# Patient Record
Sex: FEMALE | Race: OTHER | NOT HISPANIC OR LATINO | URBAN - METROPOLITAN AREA
[De-identification: names, ages, dates, MRNs, and addresses within clinical notes are randomized per-mention and may not be internally consistent; named-entity substitution may affect disease eponyms.]

---

## 2021-01-03 VITALS
WEIGHT: 130.95 LBS | OXYGEN SATURATION: 99 % | SYSTOLIC BLOOD PRESSURE: 152 MMHG | RESPIRATION RATE: 20 BRPM | DIASTOLIC BLOOD PRESSURE: 106 MMHG | HEART RATE: 107 BPM | HEIGHT: 62 IN | TEMPERATURE: 98 F

## 2021-01-03 PROCEDURE — 71045 X-RAY EXAM CHEST 1 VIEW: CPT | Mod: 26

## 2021-01-03 RX ORDER — SODIUM CHLORIDE 9 MG/ML
1000 INJECTION INTRAMUSCULAR; INTRAVENOUS; SUBCUTANEOUS ONCE
Refills: 0 | Status: COMPLETED | OUTPATIENT
Start: 2021-01-03 | End: 2021-01-03

## 2021-01-03 RX ADMIN — SODIUM CHLORIDE 1000 MILLILITER(S): 9 INJECTION INTRAMUSCULAR; INTRAVENOUS; SUBCUTANEOUS at 23:58

## 2021-01-03 RX ADMIN — Medication 1 MILLIGRAM(S): at 23:58

## 2021-01-03 NOTE — ED ADULT TRIAGE NOTE - CHIEF COMPLAINT QUOTE
pt c/o etoh withdrawal. pt reports daily etoh use. since del pt has been shaky, feeling chills, HA. reports drinking 1 bottle of colin tonight ~5pm to try to feel better, but feels worse. also reports right knee pain x3 days. denies cough.

## 2021-01-03 NOTE — ED PROVIDER NOTE - OBJECTIVE STATEMENT
29 yo F with PMH of hypothyroidism (on levothyroxine), asthma (prn meds), alcohol abuse (no DTs, szs or past admissions for withdrawal) p/w tremors, feeling anxious, headache, anxiety, nausea with dry retching and palpitations X few hours. Pt states that since the holidays (Novmeber) she has been drinking a bottle of [place] whisky almost daily. In the past, she states that she has been able to "taper" herself off alcohol and today she attempted to do this but started feeling tremulous, anxious, nauseated and with headache. Pt states that she started drinking whisky at noon today with last drink at 5 pm (7 hours PTA). Denies hallucinations, CP, SOB, abd pain. No vomiting or change in BMs, Denies SI, HI or hallucinations. Pt states that she was seen at [facility] ED for similar sxs a couple of months ago and was given an "infusion" and was dcd home. Was not admitted. Prior to the [facility] visit, pt has not had any ED visits or hospitalizations for alcohol withdrawal and states that she usiually manages her sxs at home. Has not been in alcohol rehab. Denies any illicit drug or tobacco use. Also states that she may have been exposed to someone at work who tested positive for Covid 19 and would also like to be tested for Covid 19 today. Denies fevers, chills, cough, diarrhea, loss of taste or smell or UR sxs.

## 2021-01-03 NOTE — ED PROVIDER NOTE - CCCP TRG CHIEF CMPLNT
----- Message from Anthony Ragland MD sent at 4/4/2018  9:54 AM CDT -----  Normal metabolic panel  
CALL PLACED TO PT ABOUT MESSAGE ABOVE. LAB RESULTS WERE CONVEYED TO PT. PT VERBALIZED UNDERSTANDING AND HAD NO FURTHER QUESTIONS.   
withdrawal/tremor

## 2021-01-03 NOTE — ED PROVIDER NOTE - CLINICAL SUMMARY MEDICAL DECISION MAKING FREE TEXT BOX
31 yo F with PMH of hypothyroidism (on levothyroxine), asthma (prn meds), alcohol abuse (no DTs, szs or past admissions for withdrawal) p/w tremors, feeling anxious, headache, anxiety, nausea with dry retching and palpitations X few hours. Pt states that since the holidays (Novmeber) she has been drinking a bottle of [place] whisky almost daily. In the past, she states that she has been able to "taper" herself off alcohol and today she attempted to do this but started feeling tremulous, anxious, nauseated and with headache. Pt states that she started drinking whisky at noon today with last drink at 5 pm (7 hours PTA). Denies hallucinations, CP, SOB, abd pain. No vomiting or change in BMs, Denies SI, HI or hallucinations. Pt states that she was seen at [facility] ED for similar sxs a couple of months ago and was given an "infusion" and was dcd home. Was not admitted. Prior to the [facility] visit, pt has not had any ED visits or hospitalizations for alcohol withdrawal and states that she usiually manages her sxs at home. Has not been in alcohol rehab. Denies any illicit drug or tobacco use. Also states that she may have been exposed to someone at work who tested positive for Covid 19 and would also like to be tested for Covid 19 today. Denies fevers, chills, cough, diarrhea, loss of taste or smell or UR sxs.   ED course: Pt hypertensive and tachycardic on ED arrival and noted to be tremulous. Labs sent. Ativan given. To reevaluate. 31 yo F with PMH of hypothyroidism (on levothyroxine), asthma (prn meds), alcohol abuse (no DTs, szs or past admissions for withdrawal) p/w tremors, feeling anxious, headache, anxiety, nausea with dry retching and palpitations X few hours. Pt states that since the holidays (Novmeber) she has been drinking a bottle of [place] whisky almost daily. In the past, she states that she has been able to "taper" herself off alcohol and today she attempted to do this but started feeling tremulous, anxious, nauseated and with headache. Pt states that she started drinking whisky at noon today with last drink at 5 pm (7 hours PTA). Denies hallucinations, CP, SOB, abd pain. No vomiting or change in BMs, Denies SI, HI or hallucinations. Pt states that she was seen at [facility] ED for similar sxs a couple of months ago and was given an "infusion" and was dcd home. Was not admitted. Prior to the [facility] visit, pt has not had any ED visits or hospitalizations for alcohol withdrawal and states that she usually manages her sxs at home. Has not been in alcohol rehab. Denies any illicit drug or tobacco use. Also states that she may have been exposed to someone at work who tested positive for Covid 19 and would also like to be tested for Covid 19 today. Denies fevers, chills, cough, diarrhea, loss of taste or smell or UR sxs.  ED course: VS noted- pt is hypertensive and tachycardic and tremulous. CIWA 10. Given Ativan IV. labs noted and pt with alcohol level 354. Pt also with potassium of 2.8. Given po and IV potassium. Pt feeling mildly better, and VS with some improvement after Ativan, but pt remains hypertensive and tachycardic. Given more Ativan and pt admitted to medicine for alcohol withdrawal.

## 2021-01-04 ENCOUNTER — INPATIENT (INPATIENT)
Facility: HOSPITAL | Age: 31
LOS: 0 days | Discharge: HOME | DRG: 897 | End: 2021-01-05
Attending: HOSPITALIST | Admitting: HOSPITALIST
Payer: COMMERCIAL

## 2021-01-04 DIAGNOSIS — E87.0 HYPEROSMOLALITY AND HYPERNATREMIA: ICD-10-CM

## 2021-01-04 DIAGNOSIS — Z29.9 ENCOUNTER FOR PROPHYLACTIC MEASURES, UNSPECIFIED: ICD-10-CM

## 2021-01-04 DIAGNOSIS — F10.239 ALCOHOL DEPENDENCE WITH WITHDRAWAL, UNSPECIFIED: ICD-10-CM

## 2021-01-04 DIAGNOSIS — E87.2 ACIDOSIS: ICD-10-CM

## 2021-01-04 DIAGNOSIS — E03.9 HYPOTHYROIDISM, UNSPECIFIED: ICD-10-CM

## 2021-01-04 DIAGNOSIS — J45.909 UNSPECIFIED ASTHMA, UNCOMPLICATED: ICD-10-CM

## 2021-01-04 DIAGNOSIS — E87.6 HYPOKALEMIA: ICD-10-CM

## 2021-01-04 DIAGNOSIS — R74.01 ELEVATION OF LEVELS OF LIVER TRANSAMINASE LEVELS: ICD-10-CM

## 2021-01-04 LAB
ALBUMIN SERPL ELPH-MCNC: 3.6 G/DL — SIGNIFICANT CHANGE UP (ref 3.3–5)
ALBUMIN SERPL ELPH-MCNC: 4.6 G/DL — SIGNIFICANT CHANGE UP (ref 3.3–5)
ALP SERPL-CCNC: 57 U/L — SIGNIFICANT CHANGE UP (ref 40–120)
ALP SERPL-CCNC: 73 U/L — SIGNIFICANT CHANGE UP (ref 40–120)
ALT FLD-CCNC: 20 U/L — SIGNIFICANT CHANGE UP (ref 10–45)
ALT FLD-CCNC: 27 U/L — SIGNIFICANT CHANGE UP (ref 10–45)
ANION GAP SERPL CALC-SCNC: 16 MMOL/L — SIGNIFICANT CHANGE UP (ref 5–17)
ANION GAP SERPL CALC-SCNC: 20 MMOL/L — HIGH (ref 5–17)
APPEARANCE UR: CLEAR — SIGNIFICANT CHANGE UP
APTT BLD: 28.1 SEC — SIGNIFICANT CHANGE UP (ref 27.5–35.5)
AST SERPL-CCNC: 36 U/L — SIGNIFICANT CHANGE UP (ref 10–40)
AST SERPL-CCNC: 45 U/L — HIGH (ref 10–40)
B-OH-BUTYR SERPL-SCNC: 1 MMOL/L — HIGH
B-OH-BUTYR SERPL-SCNC: 1.4 MMOL/L — HIGH
BASOPHILS # BLD AUTO: 0.05 K/UL — SIGNIFICANT CHANGE UP (ref 0–0.2)
BASOPHILS # BLD AUTO: 0.08 K/UL — SIGNIFICANT CHANGE UP (ref 0–0.2)
BASOPHILS NFR BLD AUTO: 0.7 % — SIGNIFICANT CHANGE UP (ref 0–2)
BASOPHILS NFR BLD AUTO: 1 % — SIGNIFICANT CHANGE UP (ref 0–2)
BILIRUB SERPL-MCNC: 0.5 MG/DL — SIGNIFICANT CHANGE UP (ref 0.2–1.2)
BILIRUB SERPL-MCNC: 0.8 MG/DL — SIGNIFICANT CHANGE UP (ref 0.2–1.2)
BILIRUB UR-MCNC: NEGATIVE — SIGNIFICANT CHANGE UP
BUN SERPL-MCNC: 7 MG/DL — SIGNIFICANT CHANGE UP (ref 7–23)
BUN SERPL-MCNC: 9 MG/DL — SIGNIFICANT CHANGE UP (ref 7–23)
CALCIUM SERPL-MCNC: 7.3 MG/DL — LOW (ref 8.4–10.5)
CALCIUM SERPL-MCNC: 8.5 MG/DL — SIGNIFICANT CHANGE UP (ref 8.4–10.5)
CHLORIDE SERPL-SCNC: 102 MMOL/L — SIGNIFICANT CHANGE UP (ref 96–108)
CHLORIDE SERPL-SCNC: 97 MMOL/L — SIGNIFICANT CHANGE UP (ref 96–108)
CO2 SERPL-SCNC: 24 MMOL/L — SIGNIFICANT CHANGE UP (ref 22–31)
CO2 SERPL-SCNC: 30 MMOL/L — SIGNIFICANT CHANGE UP (ref 22–31)
COLOR SPEC: YELLOW — SIGNIFICANT CHANGE UP
CREAT SERPL-MCNC: 0.54 MG/DL — SIGNIFICANT CHANGE UP (ref 0.5–1.3)
CREAT SERPL-MCNC: 0.73 MG/DL — SIGNIFICANT CHANGE UP (ref 0.5–1.3)
DIFF PNL FLD: ABNORMAL
EOSINOPHIL # BLD AUTO: 0.02 K/UL — SIGNIFICANT CHANGE UP (ref 0–0.5)
EOSINOPHIL # BLD AUTO: 0.09 K/UL — SIGNIFICANT CHANGE UP (ref 0–0.5)
EOSINOPHIL NFR BLD AUTO: 0.3 % — SIGNIFICANT CHANGE UP (ref 0–6)
EOSINOPHIL NFR BLD AUTO: 1.3 % — SIGNIFICANT CHANGE UP (ref 0–6)
EPI CELLS # UR: ABNORMAL /HPF (ref 0–5)
ETHANOL SERPL-MCNC: 354 MG/DL — HIGH (ref 0–10)
GLUCOSE SERPL-MCNC: 83 MG/DL — SIGNIFICANT CHANGE UP (ref 70–99)
GLUCOSE SERPL-MCNC: 93 MG/DL — SIGNIFICANT CHANGE UP (ref 70–99)
GLUCOSE UR QL: NEGATIVE — SIGNIFICANT CHANGE UP
HCG SERPL-ACNC: <0 MIU/ML — SIGNIFICANT CHANGE UP
HCT VFR BLD CALC: 37.9 % — SIGNIFICANT CHANGE UP (ref 34.5–45)
HCT VFR BLD CALC: 41.2 % — SIGNIFICANT CHANGE UP (ref 34.5–45)
HGB BLD-MCNC: 11.6 G/DL — SIGNIFICANT CHANGE UP (ref 11.5–15.5)
HGB BLD-MCNC: 12.8 G/DL — SIGNIFICANT CHANGE UP (ref 11.5–15.5)
IMM GRANULOCYTES NFR BLD AUTO: 0.4 % — SIGNIFICANT CHANGE UP (ref 0–1.5)
IMM GRANULOCYTES NFR BLD AUTO: 0.6 % — SIGNIFICANT CHANGE UP (ref 0–1.5)
KETONES UR-MCNC: 15 MG/DL
LACTATE SERPL-SCNC: 2.6 MMOL/L — HIGH (ref 0.5–2)
LACTATE SERPL-SCNC: 3.9 MMOL/L — HIGH (ref 0.5–2)
LEUKOCYTE ESTERASE UR-ACNC: NEGATIVE — SIGNIFICANT CHANGE UP
LIDOCAIN IGE QN: 33 U/L — SIGNIFICANT CHANGE UP (ref 7–60)
LYMPHOCYTES # BLD AUTO: 1.51 K/UL — SIGNIFICANT CHANGE UP (ref 1–3.3)
LYMPHOCYTES # BLD AUTO: 1.62 K/UL — SIGNIFICANT CHANGE UP (ref 1–3.3)
LYMPHOCYTES # BLD AUTO: 20.5 % — SIGNIFICANT CHANGE UP (ref 13–44)
LYMPHOCYTES # BLD AUTO: 22.5 % — SIGNIFICANT CHANGE UP (ref 13–44)
MAGNESIUM SERPL-MCNC: 1.8 MG/DL — SIGNIFICANT CHANGE UP (ref 1.6–2.6)
MCHC RBC-ENTMCNC: 30.6 GM/DL — LOW (ref 32–36)
MCHC RBC-ENTMCNC: 30.6 PG — SIGNIFICANT CHANGE UP (ref 27–34)
MCHC RBC-ENTMCNC: 30.7 PG — SIGNIFICANT CHANGE UP (ref 27–34)
MCHC RBC-ENTMCNC: 31.1 GM/DL — LOW (ref 32–36)
MCV RBC AUTO: 100.3 FL — HIGH (ref 80–100)
MCV RBC AUTO: 98.6 FL — SIGNIFICANT CHANGE UP (ref 80–100)
MONOCYTES # BLD AUTO: 0.46 K/UL — SIGNIFICANT CHANGE UP (ref 0–0.9)
MONOCYTES # BLD AUTO: 0.48 K/UL — SIGNIFICANT CHANGE UP (ref 0–0.9)
MONOCYTES NFR BLD AUTO: 6.1 % — SIGNIFICANT CHANGE UP (ref 2–14)
MONOCYTES NFR BLD AUTO: 6.9 % — SIGNIFICANT CHANGE UP (ref 2–14)
NEUTROPHILS # BLD AUTO: 4.57 K/UL — SIGNIFICANT CHANGE UP (ref 1.8–7.4)
NEUTROPHILS # BLD AUTO: 5.65 K/UL — SIGNIFICANT CHANGE UP (ref 1.8–7.4)
NEUTROPHILS NFR BLD AUTO: 68.2 % — SIGNIFICANT CHANGE UP (ref 43–77)
NEUTROPHILS NFR BLD AUTO: 71.5 % — SIGNIFICANT CHANGE UP (ref 43–77)
NITRITE UR-MCNC: NEGATIVE — SIGNIFICANT CHANGE UP
NRBC # BLD: 0 /100 WBCS — SIGNIFICANT CHANGE UP (ref 0–0)
NRBC # BLD: 0 /100 WBCS — SIGNIFICANT CHANGE UP (ref 0–0)
PH UR: 6.5 — SIGNIFICANT CHANGE UP (ref 5–8)
PHOSPHATE SERPL-MCNC: 1.5 MG/DL — LOW (ref 2.5–4.5)
PHOSPHATE SERPL-MCNC: 2.4 MG/DL — LOW (ref 2.5–4.5)
PLATELET # BLD AUTO: 196 K/UL — SIGNIFICANT CHANGE UP (ref 150–400)
PLATELET # BLD AUTO: 220 K/UL — SIGNIFICANT CHANGE UP (ref 150–400)
POTASSIUM SERPL-MCNC: 2.8 MMOL/L — CRITICAL LOW (ref 3.5–5.3)
POTASSIUM SERPL-MCNC: 3.5 MMOL/L — SIGNIFICANT CHANGE UP (ref 3.5–5.3)
POTASSIUM SERPL-SCNC: 2.8 MMOL/L — CRITICAL LOW (ref 3.5–5.3)
POTASSIUM SERPL-SCNC: 3.5 MMOL/L — SIGNIFICANT CHANGE UP (ref 3.5–5.3)
PROT SERPL-MCNC: 6.6 G/DL — SIGNIFICANT CHANGE UP (ref 6–8.3)
PROT SERPL-MCNC: 8 G/DL — SIGNIFICANT CHANGE UP (ref 6–8.3)
PROT UR-MCNC: NEGATIVE MG/DL — SIGNIFICANT CHANGE UP
RBC # BLD: 3.78 M/UL — LOW (ref 3.8–5.2)
RBC # BLD: 4.18 M/UL — SIGNIFICANT CHANGE UP (ref 3.8–5.2)
RBC # FLD: 18.7 % — HIGH (ref 10.3–14.5)
RBC # FLD: 19 % — HIGH (ref 10.3–14.5)
SARS-COV-2 IGG SERPL QL IA: NEGATIVE — SIGNIFICANT CHANGE UP
SARS-COV-2 IGM SERPL IA-ACNC: <0.1 INDEX — SIGNIFICANT CHANGE UP
SARS-COV-2 RNA SPEC QL NAA+PROBE: SIGNIFICANT CHANGE UP
SODIUM SERPL-SCNC: 142 MMOL/L — SIGNIFICANT CHANGE UP (ref 135–145)
SODIUM SERPL-SCNC: 147 MMOL/L — HIGH (ref 135–145)
SP GR SPEC: 1.01 — SIGNIFICANT CHANGE UP (ref 1–1.03)
T3 SERPL-MCNC: 53 NG/DL — LOW (ref 80–200)
T4 AB SER-ACNC: 5.09 UG/DL — SIGNIFICANT CHANGE UP (ref 3.17–11.72)
T4 FREE SERPL-MCNC: 0.93 NG/DL — SIGNIFICANT CHANGE UP (ref 0.7–1.48)
TSH SERPL-MCNC: 8.5 UIU/ML — HIGH (ref 0.35–4.94)
UROBILINOGEN FLD QL: 0.2 E.U./DL — SIGNIFICANT CHANGE UP
WBC # BLD: 6.71 K/UL — SIGNIFICANT CHANGE UP (ref 3.8–10.5)
WBC # BLD: 7.9 K/UL — SIGNIFICANT CHANGE UP (ref 3.8–10.5)
WBC # FLD AUTO: 6.71 K/UL — SIGNIFICANT CHANGE UP (ref 3.8–10.5)
WBC # FLD AUTO: 7.9 K/UL — SIGNIFICANT CHANGE UP (ref 3.8–10.5)
WBC UR QL: < 5 /HPF — SIGNIFICANT CHANGE UP

## 2021-01-04 PROCEDURE — 99222 1ST HOSP IP/OBS MODERATE 55: CPT | Mod: AI,GC

## 2021-01-04 PROCEDURE — 93010 ELECTROCARDIOGRAM REPORT: CPT

## 2021-01-04 PROCEDURE — 99291 CRITICAL CARE FIRST HOUR: CPT

## 2021-01-04 RX ORDER — ENOXAPARIN SODIUM 100 MG/ML
40 INJECTION SUBCUTANEOUS EVERY 24 HOURS
Refills: 0 | Status: DISCONTINUED | OUTPATIENT
Start: 2021-01-04 | End: 2021-01-05

## 2021-01-04 RX ORDER — ENOXAPARIN SODIUM 100 MG/ML
40 INJECTION SUBCUTANEOUS EVERY 24 HOURS
Refills: 0 | Status: DISCONTINUED | OUTPATIENT
Start: 2021-01-04 | End: 2021-01-04

## 2021-01-04 RX ORDER — SODIUM CHLORIDE 9 MG/ML
500 INJECTION, SOLUTION INTRAVENOUS ONCE
Refills: 0 | Status: COMPLETED | OUTPATIENT
Start: 2021-01-04 | End: 2021-01-04

## 2021-01-04 RX ORDER — SODIUM CHLORIDE 9 MG/ML
1000 INJECTION INTRAMUSCULAR; INTRAVENOUS; SUBCUTANEOUS ONCE
Refills: 0 | Status: COMPLETED | OUTPATIENT
Start: 2021-01-04 | End: 2021-01-04

## 2021-01-04 RX ORDER — POTASSIUM CHLORIDE 20 MEQ
20 PACKET (EA) ORAL ONCE
Refills: 0 | Status: COMPLETED | OUTPATIENT
Start: 2021-01-04 | End: 2021-01-04

## 2021-01-04 RX ORDER — POTASSIUM CHLORIDE 20 MEQ
10 PACKET (EA) ORAL
Refills: 0 | Status: COMPLETED | OUTPATIENT
Start: 2021-01-04 | End: 2021-01-04

## 2021-01-04 RX ORDER — MAGNESIUM SULFATE 500 MG/ML
1 VIAL (ML) INJECTION ONCE
Refills: 0 | Status: COMPLETED | OUTPATIENT
Start: 2021-01-04 | End: 2021-01-04

## 2021-01-04 RX ORDER — POTASSIUM CHLORIDE 20 MEQ
40 PACKET (EA) ORAL ONCE
Refills: 0 | Status: COMPLETED | OUTPATIENT
Start: 2021-01-04 | End: 2021-01-04

## 2021-01-04 RX ORDER — POTASSIUM CHLORIDE 20 MEQ
10 PACKET (EA) ORAL ONCE
Refills: 0 | Status: DISCONTINUED | OUTPATIENT
Start: 2021-01-04 | End: 2021-01-04

## 2021-01-04 RX ORDER — FOLIC ACID 0.8 MG
1 TABLET ORAL DAILY
Refills: 0 | Status: DISCONTINUED | OUTPATIENT
Start: 2021-01-04 | End: 2021-01-05

## 2021-01-04 RX ORDER — ONDANSETRON 8 MG/1
4 TABLET, FILM COATED ORAL ONCE
Refills: 0 | Status: COMPLETED | OUTPATIENT
Start: 2021-01-04 | End: 2021-01-05

## 2021-01-04 RX ORDER — THIAMINE MONONITRATE (VIT B1) 100 MG
100 TABLET ORAL DAILY
Refills: 0 | Status: DISCONTINUED | OUTPATIENT
Start: 2021-01-04 | End: 2021-01-05

## 2021-01-04 RX ORDER — POTASSIUM PHOSPHATE, MONOBASIC POTASSIUM PHOSPHATE, DIBASIC 236; 224 MG/ML; MG/ML
30 INJECTION, SOLUTION INTRAVENOUS ONCE
Refills: 0 | Status: COMPLETED | OUTPATIENT
Start: 2021-01-04 | End: 2021-01-04

## 2021-01-04 RX ORDER — ACETAMINOPHEN 500 MG
650 TABLET ORAL EVERY 8 HOURS
Refills: 0 | Status: DISCONTINUED | OUTPATIENT
Start: 2021-01-04 | End: 2021-01-05

## 2021-01-04 RX ORDER — LEVOTHYROXINE SODIUM 125 MCG
150 TABLET ORAL DAILY
Refills: 0 | Status: DISCONTINUED | OUTPATIENT
Start: 2021-01-04 | End: 2021-01-05

## 2021-01-04 RX ADMIN — SODIUM CHLORIDE 1000 MILLILITER(S): 9 INJECTION INTRAMUSCULAR; INTRAVENOUS; SUBCUTANEOUS at 00:58

## 2021-01-04 RX ADMIN — Medication 100 GRAM(S): at 09:40

## 2021-01-04 RX ADMIN — Medication 650 MILLIGRAM(S): at 23:30

## 2021-01-04 RX ADMIN — Medication 50 MILLIGRAM(S): at 02:02

## 2021-01-04 RX ADMIN — POTASSIUM PHOSPHATE, MONOBASIC POTASSIUM PHOSPHATE, DIBASIC 83.33 MILLIMOLE(S): 236; 224 INJECTION, SOLUTION INTRAVENOUS at 10:32

## 2021-01-04 RX ADMIN — Medication 1 TABLET(S): at 11:49

## 2021-01-04 RX ADMIN — SODIUM CHLORIDE 1000 MILLILITER(S): 9 INJECTION INTRAMUSCULAR; INTRAVENOUS; SUBCUTANEOUS at 04:17

## 2021-01-04 RX ADMIN — SODIUM CHLORIDE 500 MILLILITER(S): 9 INJECTION, SOLUTION INTRAVENOUS at 03:30

## 2021-01-04 RX ADMIN — Medication 50 MILLIGRAM(S): at 16:58

## 2021-01-04 RX ADMIN — Medication 20 MILLIEQUIVALENT(S): at 09:39

## 2021-01-04 RX ADMIN — SODIUM CHLORIDE 1000 MILLILITER(S): 9 INJECTION INTRAMUSCULAR; INTRAVENOUS; SUBCUTANEOUS at 00:30

## 2021-01-04 RX ADMIN — Medication 100 MILLIEQUIVALENT(S): at 00:59

## 2021-01-04 RX ADMIN — Medication 650 MILLIGRAM(S): at 06:33

## 2021-01-04 RX ADMIN — Medication 50 MILLIGRAM(S): at 09:39

## 2021-01-04 RX ADMIN — Medication 650 MILLIGRAM(S): at 07:30

## 2021-01-04 RX ADMIN — Medication 100 MILLIGRAM(S): at 11:49

## 2021-01-04 RX ADMIN — Medication 100 MILLIEQUIVALENT(S): at 02:13

## 2021-01-04 RX ADMIN — ENOXAPARIN SODIUM 40 MILLIGRAM(S): 100 INJECTION SUBCUTANEOUS at 09:39

## 2021-01-04 RX ADMIN — Medication 1 MILLIGRAM(S): at 11:48

## 2021-01-04 RX ADMIN — Medication 650 MILLIGRAM(S): at 23:05

## 2021-01-04 RX ADMIN — Medication 100 GRAM(S): at 03:30

## 2021-01-04 RX ADMIN — Medication 50 MILLIGRAM(S): at 23:05

## 2021-01-04 RX ADMIN — Medication 40 MILLIEQUIVALENT(S): at 00:57

## 2021-01-04 RX ADMIN — Medication 150 MICROGRAM(S): at 05:29

## 2021-01-04 RX ADMIN — Medication 1 MILLIGRAM(S): at 01:38

## 2021-01-04 NOTE — ED ADULT NURSE NOTE - NSIMPLEMENTINTERV_GEN_ALL_ED
Implemented All Fall Risk Interventions:  [place] to call system. Call bell, personal items and telephone within reach. Instruct patient to call for assistance. Room bathroom lighting operational. Non-slip footwear when patient is off stretcher. Physically safe environment: no spills, clutter or unnecessary equipment. Stretcher in lowest position, wheels locked, appropriate side rails in place. Provide visual cue, wrist band, yellow gown, etc. Monitor gait and stability. Monitor for mental status changes and reorient to person, place, and time. Review medications for side effects contributing to fall risk. Reinforce activity limits and safety measures with patient and family.

## 2021-01-04 NOTE — H&P ADULT - PROBLEM SELECTOR PLAN 7
TSH elevated at 8. Reportedly on synthroid 150mcg.   - f/u am T3 and T4  - c/w synthroid 150mcg for now  - consider endo consult

## 2021-01-04 NOTE — H&P ADULT - NSHPPHYSICALEXAM_GEN_ALL_CORE
Constitutional: female/male, WDWN, NAD  HEENT: PERRL, EOMI, sclera non-icteric, neck supple, trachea midline, no masses, no JVD, MMM, good dentition  Respiratory: CTA b/l, good air entry b/l, no wheezing, no rhonchi, no rales, without accessory muscle use and no intercostal retractions  Cardiovascular: RRR, normal S1S2, no M/R/G  Gastrointestinal: soft, NTND, no masses palpable, BS normal  Extremities: Warm, well perfused, pulses equal bilateral upper and lower extremities, no edema, no clubbing  Neurological: AAOx3, CN Grossly intact  Skin: Normal temperature, warm, dry Vital Signs (24 Hrs):  T(C): 36.8 (01-03-21 @ 23:16), Max: 36.8 (01-03-21 @ 23:16)  HR: 100 (01-04-21 @ 01:21) (94 - 107)  BP: 136/93 (01-04-21 @ 01:21) (136/93 - 152/106)  RR: 16 (01-04-21 @ 01:21) (16 - 20)  SpO2: 97% (01-04-21 @ 01:21) (97% - 99%)  Wt(kg): --    PHYSICAL EXAM:  GENERAL: NAD.   HEENT: DMM. No tongue fasciculations.  PERRLA  CHEST/LUNG: CTA B/L  HEART: Tachycardic. S1S2  ABDOMEN: TTP to RUQ/LUQ with minimal guarding.   EXTREMITIES:  2+ Peripheral Pulses. Slight tremor  PSYCH: AAOx3, cooperative, appropriate  NEUROLOGY: AAOx3, CN II-XII intact. Strength 5/5 throughout. Sensation intact. Appropriate cerebellar functions.   SKIN: No rashes or lesions

## 2021-01-04 NOTE — PROGRESS NOTE ADULT - SUBJECTIVE AND OBJECTIVE BOX
Patient was seen and examined by me at bedside. I agree with resident's note, subjective, objective physical exam, assessment and plan with following modifications/additions.    Greater than 35 minutes spent on total encounter; more than 50% of the visit was spent counseling and/or coordinating care by the attending physician.    30F with pmhx of alcohol abuse and withdrawal (no dts, never intubated) now being admitted for alcohol withdrawal. minimal ciwa on current librium regimen will begin taper today, counseling per SW may start naltrexone also on dc, anticipating dc likely 1/5 if remains clinically stable from withdrawal standpoint    Hamilton Hanna MD [phone]

## 2021-01-04 NOTE — H&P ADULT - PROBLEM SELECTOR PLAN 1
Patient with chronic alcohol abuse and history of withdrawal (never intubated/no DTs) now presenting with x4-5 days of tremors, HA, Nausea, and GI losses. WIth exam notable for tachycardia and increased BP as well as hand tremors consistent with acute alcohol withdrawal. s/p 2mg ativan in the ED  - started on librium 50mg q8hr  - 2mg ativan PRN for CIWA > 8  - High risk CIWA protocol given patient withdrawing with elevated blood alcohol level  - can consider psych consult in AM for referral to detox  - c/w MV, thiamine, and folate    #Tachycardia/Elevated BP  -likely due to withdrawal

## 2021-01-04 NOTE — H&P ADULT - ASSESSMENT
30F with pmhx of alcohol abuse and withdrawal (no dts, never intubated) now being admitted for alcohol withdrawal.

## 2021-01-04 NOTE — H&P ADULT - HISTORY OF PRESENT ILLNESS
30F c hypothyroid, asthma, alcohol abuse with history of withdrawal (no history of seizures, or DTs, never admitted to MICU-discharged from ED in the past)-drinks approximately 1 liter of hard liqueur daily, presents with x4-5 days of progressively worsening, HA, nausea, tremors. Patient had increased her alcohol intake over the holidays in November and attempted to cut back in the past, which she says she has been able to do successfully. x5 days ago patient began to feel like she was withdrawing and started experiencing HA, nausea with no emesis, tremors/shakes, and GI distress associated with decreased PO intake. Patient subsequently began to increase her alcohol intake to help decrease her withdrawal symptoms which did not help and therefore patient presented to ED today.  Patient denies fevers, chills, cough, productive cough, change in smell, dysuria, hematuria.    ROS: Patient endorsing longstanding RUQ/LUQ abdominal pain that occurs 1/day after drinking. No acute abdominal complaints currently.     ED course:  Vitals: T afebrile HR  //106 RR 16-20 Spo2 97-99% RA   Labs: Na 147, K 2.8. Bicarb 30, GAP 20. lactate 3.9 (drawn off of IV). AST/ALT 45/27. UA negative.    Imaging: CXR WNL  EKG: sinus tach  Interventions:  2mg ativan. Potassium 50mEq. 2L NS

## 2021-01-04 NOTE — H&P ADULT - PROBLEM SELECTOR PLAN 4
Patient with elevated anion gap of 20. Likely multifactorial due to elevated beta hydroxybutyrate in setting of alcoholic/starvation ketosis and elevated type B lactic acidosis (although lactate drawn off of IV line)  - s/p 2L NS in ED  + 500cc bolus of LR  - advance diet, c/w mv, thiamine, folate  - f/u am lactate, and beta hydroxybuterate

## 2021-01-04 NOTE — H&P ADULT - ATTENDING COMMENTS
Patient with elevated alcohol level – already commencing withdrawal.     [ ] High Risk CIWA Protocol – started on Librium 50q8 – up-titrate if patient receiving doses PRN Ativan.     [ ] s/p 2.5L fluids – f/u AM lactate and BHB     [ ] RUQ tenderness – alcoholic hepatitis? Will need coags to assess MDF – however, her AST barely elevated and no hyperbilirubinemia present. Lower suspicion.     [ ] Elevated TSH 8 – F/u Free T4/T3     [ ] SW/CM vs Psys for assistance with eval for rehab/detox

## 2021-01-04 NOTE — H&P ADULT - NSHPLABSRESULTS_GEN_ALL_CORE
LABS:                         12.8   7.90  >-----< 220           ( 21 @ 00:16 )             41.2       147  |  97  |   9  ----------------------< 93    (21 @ 00:16)     2.8  |  30  |  0.73    Anion Gap: 20    Ca   8.5   (21 @ 00:16)  Mg   1.8   (21 @ 00:16)  Phos x    (21 @ 00:16)       TP 8.5     |  AST 4.6    -------------------------     Alb x     |  ALT x               (21 @ 00:16)  -------------------------     T-bili 4.6  |  AlkPh 73    D-bili x          Urinalysis Basic - ( 2021 00:04 )    Color: Yellow / Appearance: Clear / S.015 / pH: x  Gluc: x / Ketone: 15 mg/dL  / Bili: Negative / Urobili: 0.2 E.U./dL   Blood: x / Protein: NEGATIVE mg/dL / Nitrite: NEGATIVE   Leuk Esterase: NEGATIVE / RBC: x / WBC < 5 /HPF   Sq Epi: x / Non Sq Epi: 5-10 /HPF / Bacteria: x      I/O SUMMARY:

## 2021-01-04 NOTE — H&P ADULT - PROBLEM SELECTOR PLAN 5
2:1 pattern. 45/27. Likely consistent with alcohol intake c concern for alcoholic hepatitis given TTP of RUQ.  - f/u am cmp  - abdominal x-ray to r/o free air

## 2021-01-04 NOTE — ED ADULT NURSE NOTE - OBJECTIVE STATEMENT
29 y/o female w/ PMH asthma, hypothyroidism, and alcohol abuse (10+years) presents to ED w/ c/o "I just don't feel well." pt reports she has does not always drink daily, but has been heavily drinking since around time of holidays, and now w/ c/o tremors, feeling flushed, headache, +nausea and RUQ pain. pt admits to drinking approx 1 bottle of [place] today. Pt states in the past, she has been able to taper herself w/o w/drawal sx. Pt reports +hx w/drawals, seen for first time at OSH in 11/2020 and dc'd from ED. Denies hx seizures or rehab. Denies CP/SOB/fevers/chills/difficulty breathing/seizures. Denies tobacco/recreational drug use.

## 2021-01-05 ENCOUNTER — TRANSCRIPTION ENCOUNTER (OUTPATIENT)
Age: 31
End: 2021-01-05

## 2021-01-05 VITALS
RESPIRATION RATE: 18 BRPM | DIASTOLIC BLOOD PRESSURE: 97 MMHG | HEART RATE: 88 BPM | TEMPERATURE: 98 F | SYSTOLIC BLOOD PRESSURE: 136 MMHG | OXYGEN SATURATION: 98 %

## 2021-01-05 PROBLEM — E03.9 HYPOTHYROIDISM, UNSPECIFIED: Chronic | Status: ACTIVE | Noted: 2021-01-04

## 2021-01-05 PROBLEM — J45.909 UNSPECIFIED ASTHMA, UNCOMPLICATED: Chronic | Status: ACTIVE | Noted: 2021-01-04

## 2021-01-05 LAB
ANION GAP SERPL CALC-SCNC: 9 MMOL/L — SIGNIFICANT CHANGE UP (ref 5–17)
BASOPHILS # BLD AUTO: 0.03 K/UL — SIGNIFICANT CHANGE UP (ref 0–0.2)
BASOPHILS NFR BLD AUTO: 0.5 % — SIGNIFICANT CHANGE UP (ref 0–2)
BUN SERPL-MCNC: 7 MG/DL — SIGNIFICANT CHANGE UP (ref 7–23)
CALCIUM SERPL-MCNC: 9 MG/DL — SIGNIFICANT CHANGE UP (ref 8.4–10.5)
CHLORIDE SERPL-SCNC: 98 MMOL/L — SIGNIFICANT CHANGE UP (ref 96–108)
CO2 SERPL-SCNC: 30 MMOL/L — SIGNIFICANT CHANGE UP (ref 22–31)
CREAT SERPL-MCNC: 0.61 MG/DL — SIGNIFICANT CHANGE UP (ref 0.5–1.3)
CULTURE RESULTS: NO GROWTH — SIGNIFICANT CHANGE UP
EOSINOPHIL # BLD AUTO: 0.08 K/UL — SIGNIFICANT CHANGE UP (ref 0–0.5)
EOSINOPHIL NFR BLD AUTO: 1.3 % — SIGNIFICANT CHANGE UP (ref 0–6)
GLUCOSE SERPL-MCNC: 104 MG/DL — HIGH (ref 70–99)
HCT VFR BLD CALC: 39.8 % — SIGNIFICANT CHANGE UP (ref 34.5–45)
HGB BLD-MCNC: 11.9 G/DL — SIGNIFICANT CHANGE UP (ref 11.5–15.5)
IMM GRANULOCYTES NFR BLD AUTO: 0.6 % — SIGNIFICANT CHANGE UP (ref 0–1.5)
LYMPHOCYTES # BLD AUTO: 1.2 K/UL — SIGNIFICANT CHANGE UP (ref 1–3.3)
LYMPHOCYTES # BLD AUTO: 19.3 % — SIGNIFICANT CHANGE UP (ref 13–44)
MAGNESIUM SERPL-MCNC: 1.9 MG/DL — SIGNIFICANT CHANGE UP (ref 1.6–2.6)
MCHC RBC-ENTMCNC: 29.8 PG — SIGNIFICANT CHANGE UP (ref 27–34)
MCHC RBC-ENTMCNC: 29.9 GM/DL — LOW (ref 32–36)
MCV RBC AUTO: 99.7 FL — SIGNIFICANT CHANGE UP (ref 80–100)
MONOCYTES # BLD AUTO: 0.71 K/UL — SIGNIFICANT CHANGE UP (ref 0–0.9)
MONOCYTES NFR BLD AUTO: 11.4 % — SIGNIFICANT CHANGE UP (ref 2–14)
NEUTROPHILS # BLD AUTO: 4.15 K/UL — SIGNIFICANT CHANGE UP (ref 1.8–7.4)
NEUTROPHILS NFR BLD AUTO: 66.9 % — SIGNIFICANT CHANGE UP (ref 43–77)
NRBC # BLD: 0 /100 WBCS — SIGNIFICANT CHANGE UP (ref 0–0)
PHOSPHATE SERPL-MCNC: 3 MG/DL — SIGNIFICANT CHANGE UP (ref 2.5–4.5)
PLATELET # BLD AUTO: 201 K/UL — SIGNIFICANT CHANGE UP (ref 150–400)
POTASSIUM SERPL-MCNC: 3.6 MMOL/L — SIGNIFICANT CHANGE UP (ref 3.5–5.3)
POTASSIUM SERPL-SCNC: 3.6 MMOL/L — SIGNIFICANT CHANGE UP (ref 3.5–5.3)
RBC # BLD: 3.99 M/UL — SIGNIFICANT CHANGE UP (ref 3.8–5.2)
RBC # FLD: 18.6 % — HIGH (ref 10.3–14.5)
SODIUM SERPL-SCNC: 137 MMOL/L — SIGNIFICANT CHANGE UP (ref 135–145)
SPECIMEN SOURCE: SIGNIFICANT CHANGE UP
WBC # BLD: 6.21 K/UL — SIGNIFICANT CHANGE UP (ref 3.8–10.5)
WBC # FLD AUTO: 6.21 K/UL — SIGNIFICANT CHANGE UP (ref 3.8–10.5)

## 2021-01-05 PROCEDURE — 84443 ASSAY THYROID STIM HORMONE: CPT

## 2021-01-05 PROCEDURE — 83735 ASSAY OF MAGNESIUM: CPT

## 2021-01-05 PROCEDURE — 80307 DRUG TEST PRSMV CHEM ANLYZR: CPT

## 2021-01-05 PROCEDURE — 84702 CHORIONIC GONADOTROPIN TEST: CPT

## 2021-01-05 PROCEDURE — 99239 HOSP IP/OBS DSCHRG MGMT >30: CPT | Mod: GC

## 2021-01-05 PROCEDURE — U0005: CPT

## 2021-01-05 PROCEDURE — 96374 THER/PROPH/DIAG INJ IV PUSH: CPT

## 2021-01-05 PROCEDURE — 83690 ASSAY OF LIPASE: CPT

## 2021-01-05 PROCEDURE — U0003: CPT

## 2021-01-05 PROCEDURE — 99285 EMERGENCY DEPT VISIT HI MDM: CPT | Mod: 25

## 2021-01-05 PROCEDURE — 82010 KETONE BODYS QUAN: CPT

## 2021-01-05 PROCEDURE — 84100 ASSAY OF PHOSPHORUS: CPT

## 2021-01-05 PROCEDURE — 80053 COMPREHEN METABOLIC PANEL: CPT

## 2021-01-05 PROCEDURE — 36415 COLL VENOUS BLD VENIPUNCTURE: CPT

## 2021-01-05 PROCEDURE — 96361 HYDRATE IV INFUSION ADD-ON: CPT

## 2021-01-05 PROCEDURE — 83605 ASSAY OF LACTIC ACID: CPT

## 2021-01-05 PROCEDURE — 84439 ASSAY OF FREE THYROXINE: CPT

## 2021-01-05 PROCEDURE — 80048 BASIC METABOLIC PNL TOTAL CA: CPT

## 2021-01-05 PROCEDURE — 71045 X-RAY EXAM CHEST 1 VIEW: CPT

## 2021-01-05 PROCEDURE — 85730 THROMBOPLASTIN TIME PARTIAL: CPT

## 2021-01-05 PROCEDURE — 87086 URINE CULTURE/COLONY COUNT: CPT

## 2021-01-05 PROCEDURE — 96376 TX/PRO/DX INJ SAME DRUG ADON: CPT

## 2021-01-05 PROCEDURE — 86769 SARS-COV-2 COVID-19 ANTIBODY: CPT

## 2021-01-05 PROCEDURE — 93005 ELECTROCARDIOGRAM TRACING: CPT

## 2021-01-05 PROCEDURE — 85025 COMPLETE CBC W/AUTO DIFF WBC: CPT

## 2021-01-05 PROCEDURE — 84480 ASSAY TRIIODOTHYRONINE (T3): CPT

## 2021-01-05 PROCEDURE — 84436 ASSAY OF TOTAL THYROXINE: CPT

## 2021-01-05 PROCEDURE — 81001 URINALYSIS AUTO W/SCOPE: CPT

## 2021-01-05 RX ORDER — NALTREXONE HYDROCHLORIDE 50 MG/1
1 TABLET, FILM COATED ORAL
Qty: 30 | Refills: 0
Start: 2021-01-05 | End: 2021-02-03

## 2021-01-05 RX ORDER — THIAMINE MONONITRATE (VIT B1) 100 MG
1 TABLET ORAL
Qty: 30 | Refills: 0
Start: 2021-01-05 | End: 2021-02-03

## 2021-01-05 RX ORDER — FOLIC ACID 0.8 MG
1 TABLET ORAL
Qty: 30 | Refills: 0
Start: 2021-01-05 | End: 2021-02-03

## 2021-01-05 RX ADMIN — Medication 1 MILLIGRAM(S): at 11:32

## 2021-01-05 RX ADMIN — Medication 150 MICROGRAM(S): at 06:08

## 2021-01-05 RX ADMIN — Medication 50 MILLIGRAM(S): at 11:32

## 2021-01-05 RX ADMIN — Medication 100 MILLIGRAM(S): at 11:32

## 2021-01-05 RX ADMIN — Medication 650 MILLIGRAM(S): at 14:07

## 2021-01-05 RX ADMIN — Medication 650 MILLIGRAM(S): at 06:30

## 2021-01-05 RX ADMIN — ONDANSETRON 4 MILLIGRAM(S): 8 TABLET, FILM COATED ORAL at 09:25

## 2021-01-05 RX ADMIN — ENOXAPARIN SODIUM 40 MILLIGRAM(S): 100 INJECTION SUBCUTANEOUS at 09:21

## 2021-01-05 RX ADMIN — Medication 650 MILLIGRAM(S): at 06:07

## 2021-01-05 RX ADMIN — Medication 1 TABLET(S): at 11:32

## 2021-01-05 NOTE — DISCHARGE NOTE PROVIDER - NSDCCPCAREPLAN_GEN_ALL_CORE_FT
PRINCIPAL DISCHARGE DIAGNOSIS  Diagnosis: Alcohol withdrawal  Assessment and Plan of Treatment: You presented with a condition called alcohol withdrawal. This happens when you drink too much alcohol and suddenly stop. This can be a life threatening condition that can lead to seizures and potentially a coma. It is very important that you stop drinking alcohol. Continue begin taking naltrexone, as this is a medication that can help reduce cravings. Please also begin taking thiamine, folic acid, and a multivitamin, as alcohol can cause nutritional deficiencies.      SECONDARY DISCHARGE DIAGNOSES  Diagnosis: Hypothyroid  Assessment and Plan of Treatment: You have a condition called hypothyroidism, which is when an organ called your thyroid does not produce enough of the hormone thyroxine. This hormone is very important in regulating many different activities in your bodies including digesting, growth, and cognition. Since your body does not make enough of this hormone, you take a medication called synthroid (levothyroxine). Your blood levels showed that you may not be taking the correct dose of the medication. Please follow up with your PCP and discuss changing the medication or discussing strategies to help you take your medication consistently.

## 2021-01-05 NOTE — DISCHARGE NOTE NURSING/CASE MANAGEMENT/SOCIAL WORK - NSDCFUADDAPPT_GEN_ALL_CORE_FT
Please follow up with your Primary Care Provider, Dr. Tricia Carrasco at [address] [place] [phone] on 01/19/2021 10:00am.    Please ensure that you fast for 6 hours prior to appointment.    Please bring your Insurance Card, Photo ID and Discharge paperwork to your appointment.    Appointment was scheduled by Ms. KATELIN Boswell, Referral Coordinator.

## 2021-01-05 NOTE — DISCHARGE NOTE PROVIDER - NSDCFUADDAPPT_GEN_ALL_CORE_FT
Please schedule with any PCP Please follow up with your Primary Care Provider, Dr. Tricia Carrasco at [address] [place] [phone] on     Please bring your Insurance Card, Photo ID and Discharge paperwork to your appointment.    Appointment was scheduled by Ms. KATELIN Boswell, Referral Coordinator. Please follow up with your Primary Care Provider, Dr. Tricia Carrasco at [address] [place] [address] on 01/19/2021 10:00am.    Please bring your Insurance Card, Photo ID and Discharge paperwork to your appointment.    Appointment was scheduled by Ms. KATELIN Boswell, Referral Coordinator. Please follow up with your Primary Care Provider, Dr. Tricia Carrasco at [address] [place] [phone] on 01/19/2021 10:00am.    Please ensure that you fast for 6 hours prior to appointment.    Please bring your Insurance Card, Photo ID and Discharge paperwork to your appointment.    Appointment was scheduled by Ms. KATELIN Boswell, Referral Coordinator.

## 2021-01-05 NOTE — DISCHARGE NOTE PROVIDER - HOSPITAL COURSE
#Discharge: do not delete    Patient is 31 yo F with past medical history of hypothyroid, asthma, alcohol abuse with history of withdrawal  Presented with x4-5 days of progressively worsening, HA, nausea, tremors, found to have alcohol withdrawal  Problem List/Main Diagnoses (system-based):     #Alcohol withdrawal  Patient with chronic alcohol abuse and history of withdrawal (never intubated/no DTs) now presenting with x4-5 days of tremors, HA, Nausea, and GI losses. Her initial exam was notable for tachycardia and increased BP as well as hand tremors consistent with acute alcohol withdrawal. She was placed on standing librium and ativan PRN for CIWA >8. She was tapered off librium as her withdrawal symptoms improved. She was given a multivitamin, thiamine, and folic acid.  - outpatient PCP follow up, counseling on alcohol cessation  - c/w MV, thiamine, and folate    #Hypothyroid  Patient has pmhx of hypothyroidism, currently on 150mcg of synthroid as outpatient, presenting with elevated TSH of 8. T4 within normal limits. Patient currently asymptomatic.   - c/w synthroid 150mcg, follow up with outpatient PCP    #Tachycardia/Elevated BP  Patient had initially presented with tachycardia and elevated blood pressure, likely due to withdrawal, currently resolved s/p fluids  -no additional workup necessary    #Metabolic acidosis, increased anion gap  Patient with elevated anion gap of 20. Likely multifactorial due to elevated beta hydroxybutyrate in setting of alcoholic/starvation ketosis and elevated type B lactic acidosis. Resolved s/p fluids. No additional workup necessary.    #Transaminitis.  Plan: 2:1 pattern. 45/27. Likely consistent with alcohol intake, currently resolved  -now resolved, no additional workup necessary      Inpatient treatment course: see above  New medications: multivitamin, folic acid, thiamine     #Discharge: do not delete    Patient is 31 yo F with past medical history of hypothyroid, asthma, alcohol abuse with history of withdrawal  Presented with x4-5 days of progressively worsening, HA, nausea, tremors, found to have alcohol withdrawal  Problem List/Main Diagnoses (system-based):     #Alcohol withdrawal  Patient with chronic alcohol abuse and history of withdrawal (never intubated/no DTs) now presenting with x4-5 days of tremors, HA, Nausea, and GI losses. Her initial exam was notable for tachycardia and increased BP as well as hand tremors consistent with acute alcohol withdrawal. She was placed on standing librium and ativan PRN for CIWA >8. She was tapered off librium as her withdrawal symptoms improved. She was given a multivitamin, thiamine, and folic acid.  - outpatient PCP follow up, counseling on alcohol cessation  - c/w MV, thiamine, and folate    #Hypothyroid  Patient has pmhx of hypothyroidism, currently on 150mcg of synthroid as outpatient, presenting with elevated TSH of 8. T4 within normal limits. Patient currently asymptomatic.   - c/w synthroid 150mcg, follow up with outpatient PCP    #Tachycardia/Elevated BP  Patient had initially presented with tachycardia and elevated blood pressure, likely due to withdrawal, currently resolved s/p fluids  -no additional workup necessary    #Metabolic acidosis, increased anion gap  Patient with elevated anion gap of 20. Likely multifactorial due to elevated beta hydroxybutyrate in setting of alcoholic/starvation ketosis and elevated type B lactic acidosis. Resolved s/p fluids. No additional workup necessary.    #Transaminitis.  Plan: 2:1 pattern. 45/27. Likely consistent with alcohol intake, currently resolved  -now resolved, no additional workup necessary    Inpatient treatment course: see above  New medications: multivitamin, folic acid, thiamine  Labs to follow up: TSH, T4

## 2021-01-05 NOTE — DISCHARGE NOTE PROVIDER - CARE PROVIDERS DIRECT ADDRESSES
,[email or web address]

## 2021-01-05 NOTE — DISCHARGE NOTE NURSING/CASE MANAGEMENT/SOCIAL WORK - PATIENT PORTAL LINK FT
You can access the FollowMyHealth Patient Portal offered by [facility] by registering at the following website: [email or web address]. By joining [employer]’s FollowMyHealth portal, you will also be able to view your health information using other applications (apps) compatible with our system.

## 2021-01-05 NOTE — DISCHARGE NOTE PROVIDER - NSDCMRMEDTOKEN_GEN_ALL_CORE_FT
albuterol 0.63 mg/3 mL (0.021%) inhalation solution: 3 milliliter(s) inhaled 3 times a day  Synthroid 150 mcg (0.15 mg) oral tablet: 1 tab(s) orally once a day   albuterol 0.63 mg/3 mL (0.021%) inhalation solution: 3 milliliter(s) inhaled 3 times a day  folic acid 1 mg oral tablet: 1 tab(s) orally once a day  Multiple Vitamins oral tablet: 1 tab(s) orally once a day  Synthroid 150 mcg (0.15 mg) oral tablet: 1 tab(s) orally once a day  thiamine 100 mg oral tablet: 1 tab(s) orally once a day   albuterol 0.63 mg/3 mL (0.021%) inhalation solution: 3 milliliter(s) inhaled 3 times a day  folic acid 1 mg oral tablet: 1 tab(s) orally once a day  Multiple Vitamins oral tablet: 1 tab(s) orally once a day  naltrexone 50 mg oral tablet: 1 tab(s) orally once a day   Synthroid 150 mcg (0.15 mg) oral tablet: 1 tab(s) orally once a day  thiamine 100 mg oral tablet: 1 tab(s) orally once a day

## 2021-01-05 NOTE — DISCHARGE NOTE PROVIDER - CARE PROVIDER_API CALL
Moo Saxena)  Internal Medicine  [address] [place] [address]  Phone: [phone]  Fax: [phone]  Established Patient  Follow Up Time: 2 weeks   Tricia Carrasco; MPH)  Internal Medicine  [address] [place] [address]  Phone: [phone]  Fax: [phone]  Follow Up Time:

## 2021-01-05 NOTE — DISCHARGE NOTE PROVIDER - PROVIDER TOKENS
PROVIDER:[TOKEN:[[id number]],FOLLOWUP:[2 weeks],ESTABLISHEDPATIENT:[T]] PROVIDER:[TOKEN:[[id number]]]
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
## 2021-01-08 DIAGNOSIS — J45.909 UNSPECIFIED ASTHMA, UNCOMPLICATED: ICD-10-CM

## 2021-01-08 DIAGNOSIS — F10.239 ALCOHOL DEPENDENCE WITH WITHDRAWAL, UNSPECIFIED: ICD-10-CM

## 2021-01-08 DIAGNOSIS — E87.0 HYPEROSMOLALITY AND HYPERNATREMIA: ICD-10-CM

## 2021-01-08 DIAGNOSIS — Z20.818 CONTACT WITH AND (SUSPECTED) EXPOSURE TO OTHER BACTERIAL COMMUNICABLE DISEASES: ICD-10-CM

## 2021-01-08 DIAGNOSIS — E87.2 ACIDOSIS: ICD-10-CM

## 2021-01-08 DIAGNOSIS — E03.9 HYPOTHYROIDISM, UNSPECIFIED: ICD-10-CM

## 2021-01-08 DIAGNOSIS — E87.6 HYPOKALEMIA: ICD-10-CM

## 2021-01-08 DIAGNOSIS — Z71.41 ALCOHOL ABUSE COUNSELING AND SURVEILLANCE OF ALCOHOLIC: ICD-10-CM

## 2021-01-08 DIAGNOSIS — Z91.013 ALLERGY TO SEAFOOD: ICD-10-CM

## 2021-01-19 ENCOUNTER — LABORATORY RESULT (OUTPATIENT)
Age: 31
End: 2021-01-19

## 2021-01-19 ENCOUNTER — APPOINTMENT (OUTPATIENT)
Dept: INTERNAL MEDICINE | Facility: CLINIC | Age: 31
End: 2021-01-19
Payer: COMMERCIAL

## 2021-01-19 VITALS
WEIGHT: 131 LBS | TEMPERATURE: 97.2 F | BODY MASS INDEX: 24.11 KG/M2 | DIASTOLIC BLOOD PRESSURE: 82 MMHG | HEART RATE: 82 BPM | SYSTOLIC BLOOD PRESSURE: 119 MMHG | OXYGEN SATURATION: 99 % | HEIGHT: 62 IN

## 2021-01-19 DIAGNOSIS — J45.20 MILD INTERMITTENT ASTHMA, UNCOMPLICATED: ICD-10-CM

## 2021-01-19 DIAGNOSIS — Z00.00 ENCOUNTER FOR GENERAL ADULT MEDICAL EXAMINATION W/OUT ABNORMAL FINDINGS: ICD-10-CM

## 2021-01-19 DIAGNOSIS — Z72.89 OTHER PROBLEMS RELATED TO LIFESTYLE: ICD-10-CM

## 2021-01-19 DIAGNOSIS — Z83.49 FAMILY HISTORY OF OTHER ENDOCRINE, NUTRITIONAL AND METABOLIC DISEASES: ICD-10-CM

## 2021-01-19 DIAGNOSIS — Z11.59 ENCOUNTER FOR SCREENING FOR OTHER VIRAL DISEASES: ICD-10-CM

## 2021-01-19 DIAGNOSIS — Z78.9 OTHER SPECIFIED HEALTH STATUS: ICD-10-CM

## 2021-01-19 DIAGNOSIS — E01.0 IODINE-DEFICIENCY RELATED DIFFUSE (ENDEMIC) GOITER: ICD-10-CM

## 2021-01-19 PROCEDURE — G0443: CPT

## 2021-01-19 PROCEDURE — 99072 ADDL SUPL MATRL&STAF TM PHE: CPT

## 2021-01-19 PROCEDURE — 99385 PREV VISIT NEW AGE 18-39: CPT

## 2021-01-19 PROCEDURE — G0444 DEPRESSION SCREEN ANNUAL: CPT

## 2021-01-19 PROCEDURE — 99204 OFFICE O/P NEW MOD 45 MIN: CPT | Mod: 25

## 2021-01-19 RX ORDER — FAMOTIDINE 20 MG/1
20 TABLET, FILM COATED ORAL
Qty: 60 | Refills: 0 | Status: COMPLETED | COMMUNITY
Start: 2020-11-29

## 2021-01-19 RX ORDER — LEVOTHYROXINE SODIUM 0.15 MG/1
150 TABLET ORAL
Refills: 0 | Status: ACTIVE | COMMUNITY

## 2021-01-19 RX ORDER — ALBUTEROL SULFATE 90 UG/1
108 (90 BASE) AEROSOL, METERED RESPIRATORY (INHALATION)
Refills: 0 | Status: ACTIVE | COMMUNITY

## 2021-01-19 RX ORDER — MULTIVITAMIN WITH FOLIC ACID 400 MCG
TABLET ORAL
Qty: 90 | Refills: 0 | Status: COMPLETED | COMMUNITY
Start: 2020-11-29

## 2021-01-19 RX ORDER — LEVOTHYROXINE SODIUM 0.15 MG/1
150 TABLET ORAL
Qty: 90 | Refills: 0 | Status: COMPLETED | COMMUNITY
Start: 2020-11-26

## 2021-01-19 RX ORDER — AMOXICILLIN AND CLAVULANATE POTASSIUM 500; 125 MG/1; MG/1
500-125 TABLET, FILM COATED ORAL
Qty: 21 | Refills: 0 | Status: COMPLETED | COMMUNITY
Start: 2020-10-10

## 2021-01-19 RX ORDER — FOLIC ACID 1 MG/1
1 TABLET ORAL
Qty: 30 | Refills: 0 | Status: COMPLETED | COMMUNITY
Start: 2021-01-05

## 2021-01-19 NOTE — PHYSICAL EXAM
[No Acute Distress] : no acute distress [Normal Sclera/Conjunctiva] : normal sclera/conjunctiva [No Lymphadenopathy] : no lymphadenopathy [Clear to Auscultation] : lungs were clear to auscultation bilaterally [Normal Rate] : normal rate  [Regular Rhythm] : with a regular rhythm [Pedal Pulses Present] : the pedal pulses are present [No Edema] : there was no peripheral edema [Soft] : abdomen soft [Non Tender] : non-tender [No CVA Tenderness] : no CVA  tenderness [No Rash] : no rash [Normal] : affect was normal and insight and judgment were intact [Declined Breast Exam] : declined breast exam

## 2021-01-20 DIAGNOSIS — R79.89 OTHER SPECIFIED ABNORMAL FINDINGS OF BLOOD CHEMISTRY: ICD-10-CM

## 2021-01-20 LAB
25(OH)D3 SERPL-MCNC: 17.5 NG/ML
ALBUMIN SERPL ELPH-MCNC: 4.5 G/DL
ALP BLD-CCNC: 65 U/L
ALT SERPL-CCNC: 48 U/L
ANION GAP SERPL CALC-SCNC: 15 MMOL/L
APPEARANCE: ABNORMAL
AST SERPL-CCNC: 53 U/L
BACTERIA: NEGATIVE
BASOPHILS # BLD AUTO: 0.12 K/UL
BASOPHILS NFR BLD AUTO: 1.1 %
BILIRUB SERPL-MCNC: 0.2 MG/DL
BILIRUBIN URINE: NEGATIVE
BLOOD URINE: ABNORMAL
BUN SERPL-MCNC: 11 MG/DL
CALCIUM SERPL-MCNC: 9.2 MG/DL
CHLORIDE SERPL-SCNC: 100 MMOL/L
CHOLEST SERPL-MCNC: 192 MG/DL
CO2 SERPL-SCNC: 25 MMOL/L
COLOR: YELLOW
CREAT SERPL-MCNC: 0.87 MG/DL
EOSINOPHIL # BLD AUTO: 0.12 K/UL
EOSINOPHIL NFR BLD AUTO: 1.1 %
ESTIMATED AVERAGE GLUCOSE: 82 MG/DL
GLUCOSE QUALITATIVE U: NEGATIVE
GLUCOSE SERPL-MCNC: 88 MG/DL
HBA1C MFR BLD HPLC: 4.5 %
HBV SURFACE AB SER QL: REACTIVE
HBV SURFACE AG SER QL: NONREACTIVE
HCT VFR BLD CALC: 40.1 %
HCV AB SER QL: NONREACTIVE
HCV S/CO RATIO: 0.09 S/CO
HDLC SERPL-MCNC: 55 MG/DL
HGB A MFR BLD: 97.5 %
HGB A2 MFR BLD: 2.5 %
HGB BLD-MCNC: 11.8 G/DL
HGB FRACT BLD-IMP: NORMAL
HIV1+2 AB SPEC QL IA.RAPID: NONREACTIVE
HYALINE CASTS: 4 /LPF
IMM GRANULOCYTES NFR BLD AUTO: 0.4 %
KETONES URINE: NEGATIVE
LDLC SERPL CALC-MCNC: 106 MG/DL
LEUKOCYTE ESTERASE URINE: NEGATIVE
LYMPHOCYTES # BLD AUTO: 1.6 K/UL
LYMPHOCYTES NFR BLD AUTO: 15.3 %
MAN DIFF?: NORMAL
MCHC RBC-ENTMCNC: 29.4 GM/DL
MCHC RBC-ENTMCNC: 30.6 PG
MCV RBC AUTO: 104.2 FL
MICROSCOPIC-UA: NORMAL
MONOCYTES # BLD AUTO: 0.79 K/UL
MONOCYTES NFR BLD AUTO: 7.5 %
NEUTROPHILS # BLD AUTO: 7.81 K/UL
NEUTROPHILS NFR BLD AUTO: 74.6 %
NITRITE URINE: NEGATIVE
NONHDLC SERPL-MCNC: 137 MG/DL
PH URINE: 6
PLATELET # BLD AUTO: 351 K/UL
POTASSIUM SERPL-SCNC: 3.3 MMOL/L
PROT SERPL-MCNC: 7.4 G/DL
PROTEIN URINE: ABNORMAL
RBC # BLD: 3.85 M/UL
RBC # FLD: 18 %
RED BLOOD CELLS URINE: 6 /HPF
SODIUM SERPL-SCNC: 140 MMOL/L
SPECIFIC GRAVITY URINE: 1.03
SQUAMOUS EPITHELIAL CELLS: 4 /HPF
T PALLIDUM AB SER QL IA: NEGATIVE
TRIGL SERPL-MCNC: 159 MG/DL
TSH SERPL-ACNC: 9.93 UIU/ML
UROBILINOGEN URINE: NORMAL
WBC # FLD AUTO: 10.48 K/UL
WHITE BLOOD CELLS URINE: 6 /HPF

## 2021-01-20 NOTE — HISTORY OF PRESENT ILLNESS
[FreeTextEntry1] : 1. Pt presents to establish Primary Care and is requesting an Annual Physical.\par 2. 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

## 2021-01-20 NOTE — HEALTH RISK ASSESSMENT
[Yes] : Yes [2 - 4 times a month (2 pts)] : 2-4 times a month (2 points) [5 or 6 (2 pts)] : 5 or 6 (2  points) [Never (0 pts)] : Never (0 points) [No] : In the past 12 months have you used drugs other than those required for medical reasons? No [No falls in past year] : Patient reported no falls in the past year [0] : 2) Feeling down, depressed, or hopeless: Not at all (0) [Patient reported PAP Smear was normal] : Patient reported PAP Smear was normal [HIV test declined] : HIV test declined [Hepatitis C test declined] : Hepatitis C test declined [With Family] : lives with family [Employed] : employed [Single] : single [Fully functional (bathing, dressing, toileting, transferring, walking, feeding)] : Fully functional (bathing, dressing, toileting, transferring, walking, feeding) [Fully functional (using the telephone, shopping, preparing meals, housekeeping, doing laundry, using] : Fully functional and needs no help or supervision to perform IADLs (using the telephone, shopping, preparing meals, housekeeping, doing laundry, using transportation, managing medications and managing finances) [With Patient/Caregiver] : With Patient/Caregiver [Designated Healthcare Proxy] : Designated healthcare proxy [Name: ___] : Health Care Proxy's Name: [unfilled]  [Relationship: ___] : Relationship: [unfilled] [1] : 1) Little interest or pleasure doing things for several days (1) [] : No [de-identified] : bottle of whiskey every other day - evenings and weekends  [[id number]] : 1 [EyeExamDate] : 1/1/2018 [Sexually Active] : not sexually active [Reports changes in vision] : Reports no changes in vision [Reports changes in hearing] : Reports no changes in hearing [Reports changes in dental health] : Reports no changes in dental health [PapSmearDate] : 1/1/2020 [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

## 2021-01-20 NOTE — COUNSELING
[Hazards of at-risk alcohol use discussed] : Hazards of at-risk alcohol use discussed [AUDIT-C Screening administered and reviewed] : AUDIT-C Screening administered and reviewed [Strategies to reduce or eliminate alcohol use discussed] : Strategies to reduce or eliminate alcohol use discussed [Support options provided] : Support options provided [Quit Drinking] : Quit Drinking [Participate in Treatment Program] : Participate in treatment program [FreeTextEntry1] : 15

## 2021-01-22 LAB
FOLATE SERPL-MCNC: 5.3 NG/ML
SARS-COV-2 IGG SERPL IA-ACNC: <0.1 INDEX
SARS-COV-2 IGG SERPL QL IA: NEGATIVE
VIT B12 SERPL-MCNC: 242 PG/ML

## 2021-01-24 DIAGNOSIS — R79.89 OTHER SPECIFIED ABNORMAL FINDINGS OF BLOOD CHEMISTRY: ICD-10-CM

## 2021-01-24 DIAGNOSIS — R71.8 OTHER ABNORMALITY OF RED BLOOD CELLS: ICD-10-CM

## 2021-01-25 ENCOUNTER — NON-APPOINTMENT (OUTPATIENT)
Age: 31
End: 2021-01-25

## 2021-01-27 LAB
AMPHET UR-MCNC: NEGATIVE
BARBITURATES UR-MCNC: NEGATIVE
BENZODIAZ UR-MCNC: NEGATIVE
COCAINE METAB.OTHER UR-MCNC: NEGATIVE
CREATININE, URINE: 210.9 MG/DL
METHADONE UR-MCNC: NEGATIVE
METHAQUALONE UR-MCNC: NEGATIVE
OPIATES UR-MCNC: NEGATIVE
PCP UR-MCNC: NEGATIVE
PROPOXYPH UR QL: NEGATIVE
THC UR QL: NORMAL

## 2021-01-29 ENCOUNTER — INPATIENT (INPATIENT)
Facility: HOSPITAL | Age: 31
LOS: 1 days | Discharge: LEFT BEFORE DISCHARGE | DRG: 894 | End: 2021-01-31
Attending: STUDENT IN AN ORGANIZED HEALTH CARE EDUCATION/TRAINING PROGRAM | Admitting: STUDENT IN AN ORGANIZED HEALTH CARE EDUCATION/TRAINING PROGRAM
Payer: COMMERCIAL

## 2021-01-29 VITALS
DIASTOLIC BLOOD PRESSURE: 90 MMHG | WEIGHT: 132.06 LBS | HEART RATE: 103 BPM | TEMPERATURE: 98 F | SYSTOLIC BLOOD PRESSURE: 167 MMHG | HEIGHT: 62 IN | OXYGEN SATURATION: 99 % | RESPIRATION RATE: 18 BRPM

## 2021-01-29 DIAGNOSIS — F10.239 ALCOHOL DEPENDENCE WITH WITHDRAWAL, UNSPECIFIED: ICD-10-CM

## 2021-01-29 DIAGNOSIS — R63.8 OTHER SYMPTOMS AND SIGNS CONCERNING FOOD AND FLUID INTAKE: ICD-10-CM

## 2021-01-29 DIAGNOSIS — E03.9 HYPOTHYROIDISM, UNSPECIFIED: ICD-10-CM

## 2021-01-29 DIAGNOSIS — E87.2 ACIDOSIS: ICD-10-CM

## 2021-01-29 DIAGNOSIS — J45.909 UNSPECIFIED ASTHMA, UNCOMPLICATED: ICD-10-CM

## 2021-01-29 LAB
ALBUMIN SERPL ELPH-MCNC: 4 G/DL — SIGNIFICANT CHANGE UP (ref 3.3–5)
ALBUMIN SERPL ELPH-MCNC: 4.9 G/DL — SIGNIFICANT CHANGE UP (ref 3.3–5)
ALP SERPL-CCNC: 56 U/L — SIGNIFICANT CHANGE UP (ref 40–120)
ALP SERPL-CCNC: 68 U/L — SIGNIFICANT CHANGE UP (ref 40–120)
ALT FLD-CCNC: 29 U/L — SIGNIFICANT CHANGE UP (ref 10–45)
ALT FLD-CCNC: 39 U/L — SIGNIFICANT CHANGE UP (ref 10–45)
ANION GAP SERPL CALC-SCNC: 11 MMOL/L — SIGNIFICANT CHANGE UP (ref 5–17)
ANION GAP SERPL CALC-SCNC: 18 MMOL/L — HIGH (ref 5–17)
AST SERPL-CCNC: 45 U/L — HIGH (ref 10–40)
AST SERPL-CCNC: 65 U/L — HIGH (ref 10–40)
B-OH-BUTYR SERPL-SCNC: 1.9 MMOL/L — HIGH
BASE EXCESS BLDV CALC-SCNC: 8.1 MMOL/L — SIGNIFICANT CHANGE UP
BASOPHILS # BLD AUTO: 0.04 K/UL — SIGNIFICANT CHANGE UP (ref 0–0.2)
BASOPHILS NFR BLD AUTO: 0.5 % — SIGNIFICANT CHANGE UP (ref 0–2)
BILIRUB SERPL-MCNC: 0.5 MG/DL — SIGNIFICANT CHANGE UP (ref 0.2–1.2)
BILIRUB SERPL-MCNC: 0.7 MG/DL — SIGNIFICANT CHANGE UP (ref 0.2–1.2)
BUN SERPL-MCNC: 6 MG/DL — LOW (ref 7–23)
BUN SERPL-MCNC: 8 MG/DL — SIGNIFICANT CHANGE UP (ref 7–23)
CA-I SERPL-SCNC: 1.09 MMOL/L — LOW (ref 1.12–1.3)
CALCIUM SERPL-MCNC: 10.1 MG/DL — SIGNIFICANT CHANGE UP (ref 8.4–10.5)
CALCIUM SERPL-MCNC: 8.6 MG/DL — SIGNIFICANT CHANGE UP (ref 8.4–10.5)
CHLORIDE SERPL-SCNC: 91 MMOL/L — LOW (ref 96–108)
CHLORIDE SERPL-SCNC: 96 MMOL/L — SIGNIFICANT CHANGE UP (ref 96–108)
CO2 SERPL-SCNC: 30 MMOL/L — SIGNIFICANT CHANGE UP (ref 22–31)
CO2 SERPL-SCNC: 31 MMOL/L — SIGNIFICANT CHANGE UP (ref 22–31)
CREAT SERPL-MCNC: 0.59 MG/DL — SIGNIFICANT CHANGE UP (ref 0.5–1.3)
CREAT SERPL-MCNC: 0.59 MG/DL — SIGNIFICANT CHANGE UP (ref 0.5–1.3)
EOSINOPHIL # BLD AUTO: 0 K/UL — SIGNIFICANT CHANGE UP (ref 0–0.5)
EOSINOPHIL NFR BLD AUTO: 0 % — SIGNIFICANT CHANGE UP (ref 0–6)
ETHANOL SERPL-MCNC: <10 MG/DL — SIGNIFICANT CHANGE UP (ref 0–10)
GAS PNL BLDV: 137 MMOL/L — LOW (ref 138–146)
GAS PNL BLDV: SIGNIFICANT CHANGE UP
GLUCOSE SERPL-MCNC: 113 MG/DL — HIGH (ref 70–99)
GLUCOSE SERPL-MCNC: 92 MG/DL — SIGNIFICANT CHANGE UP (ref 70–99)
HCO3 BLDV-SCNC: 31 MMOL/L — HIGH (ref 20–27)
HCT VFR BLD CALC: 40.3 % — SIGNIFICANT CHANGE UP (ref 34.5–45)
HGB BLD-MCNC: 12.4 G/DL — SIGNIFICANT CHANGE UP (ref 11.5–15.5)
IMM GRANULOCYTES NFR BLD AUTO: 0.6 % — SIGNIFICANT CHANGE UP (ref 0–1.5)
LACTATE SERPL-SCNC: 0.8 MMOL/L — SIGNIFICANT CHANGE UP (ref 0.5–2)
LIDOCAIN IGE QN: 35 U/L — SIGNIFICANT CHANGE UP (ref 7–60)
LYMPHOCYTES # BLD AUTO: 0.51 K/UL — LOW (ref 1–3.3)
LYMPHOCYTES # BLD AUTO: 5.8 % — LOW (ref 13–44)
MAGNESIUM SERPL-MCNC: 1.3 MG/DL — LOW (ref 1.6–2.6)
MCHC RBC-ENTMCNC: 30.2 PG — SIGNIFICANT CHANGE UP (ref 27–34)
MCHC RBC-ENTMCNC: 30.8 GM/DL — LOW (ref 32–36)
MCV RBC AUTO: 98.1 FL — SIGNIFICANT CHANGE UP (ref 80–100)
MONOCYTES # BLD AUTO: 0.57 K/UL — SIGNIFICANT CHANGE UP (ref 0–0.9)
MONOCYTES NFR BLD AUTO: 6.4 % — SIGNIFICANT CHANGE UP (ref 2–14)
NEUTROPHILS # BLD AUTO: 7.67 K/UL — HIGH (ref 1.8–7.4)
NEUTROPHILS NFR BLD AUTO: 86.7 % — HIGH (ref 43–77)
NRBC # BLD: 0 /100 WBCS — SIGNIFICANT CHANGE UP (ref 0–0)
PCO2 BLDV: 39 MMHG — LOW (ref 41–51)
PH BLDV: 7.53 — HIGH (ref 7.32–7.43)
PLATELET # BLD AUTO: 219 K/UL — SIGNIFICANT CHANGE UP (ref 150–400)
PO2 BLDV: 30 MMHG — SIGNIFICANT CHANGE UP
POTASSIUM BLDV-SCNC: 3 MMOL/L — LOW (ref 3.5–4.9)
POTASSIUM SERPL-MCNC: 3.1 MMOL/L — LOW (ref 3.5–5.3)
POTASSIUM SERPL-MCNC: 3.3 MMOL/L — LOW (ref 3.5–5.3)
POTASSIUM SERPL-SCNC: 3.1 MMOL/L — LOW (ref 3.5–5.3)
POTASSIUM SERPL-SCNC: 3.3 MMOL/L — LOW (ref 3.5–5.3)
PROT SERPL-MCNC: 6.9 G/DL — SIGNIFICANT CHANGE UP (ref 6–8.3)
PROT SERPL-MCNC: 8.6 G/DL — HIGH (ref 6–8.3)
RBC # BLD: 4.11 M/UL — SIGNIFICANT CHANGE UP (ref 3.8–5.2)
RBC # FLD: 17.3 % — HIGH (ref 10.3–14.5)
SAO2 % BLDV: 56 % — SIGNIFICANT CHANGE UP
SARS-COV-2 RNA SPEC QL NAA+PROBE: SIGNIFICANT CHANGE UP
SODIUM SERPL-SCNC: 137 MMOL/L — SIGNIFICANT CHANGE UP (ref 135–145)
SODIUM SERPL-SCNC: 140 MMOL/L — SIGNIFICANT CHANGE UP (ref 135–145)
WBC # BLD: 8.84 K/UL — SIGNIFICANT CHANGE UP (ref 3.8–10.5)
WBC # FLD AUTO: 8.84 K/UL — SIGNIFICANT CHANGE UP (ref 3.8–10.5)

## 2021-01-29 PROCEDURE — 99223 1ST HOSP IP/OBS HIGH 75: CPT | Mod: GC

## 2021-01-29 PROCEDURE — 99285 EMERGENCY DEPT VISIT HI MDM: CPT

## 2021-01-29 PROCEDURE — 93010 ELECTROCARDIOGRAM REPORT: CPT

## 2021-01-29 RX ORDER — ONDANSETRON 8 MG/1
4 TABLET, FILM COATED ORAL ONCE
Refills: 0 | Status: COMPLETED | OUTPATIENT
Start: 2021-01-29 | End: 2021-01-29

## 2021-01-29 RX ORDER — SODIUM CHLORIDE 9 MG/ML
1000 INJECTION INTRAMUSCULAR; INTRAVENOUS; SUBCUTANEOUS ONCE
Refills: 0 | Status: COMPLETED | OUTPATIENT
Start: 2021-01-29 | End: 2021-01-29

## 2021-01-29 RX ORDER — POTASSIUM CHLORIDE 20 MEQ
40 PACKET (EA) ORAL ONCE
Refills: 0 | Status: COMPLETED | OUTPATIENT
Start: 2021-01-29 | End: 2021-01-29

## 2021-01-29 RX ORDER — THIAMINE MONONITRATE (VIT B1) 100 MG
100 TABLET ORAL DAILY
Refills: 0 | Status: DISCONTINUED | OUTPATIENT
Start: 2021-01-29 | End: 2021-01-31

## 2021-01-29 RX ORDER — ACETAMINOPHEN 500 MG
1000 TABLET ORAL ONCE
Refills: 0 | Status: COMPLETED | OUTPATIENT
Start: 2021-01-29 | End: 2021-01-29

## 2021-01-29 RX ORDER — LEVOTHYROXINE SODIUM 125 MCG
150 TABLET ORAL DAILY
Refills: 0 | Status: DISCONTINUED | OUTPATIENT
Start: 2021-01-30 | End: 2021-01-31

## 2021-01-29 RX ORDER — SODIUM CHLORIDE 9 MG/ML
1000 INJECTION, SOLUTION INTRAVENOUS
Refills: 0 | Status: DISCONTINUED | OUTPATIENT
Start: 2021-01-29 | End: 2021-01-31

## 2021-01-29 RX ORDER — FOLIC ACID 0.8 MG
1 TABLET ORAL DAILY
Refills: 0 | Status: DISCONTINUED | OUTPATIENT
Start: 2021-01-29 | End: 2021-01-31

## 2021-01-29 RX ORDER — ONDANSETRON 8 MG/1
4 TABLET, FILM COATED ORAL EVERY 8 HOURS
Refills: 0 | Status: DISCONTINUED | OUTPATIENT
Start: 2021-01-29 | End: 2021-01-31

## 2021-01-29 RX ORDER — MAGNESIUM SULFATE 500 MG/ML
2 VIAL (ML) INJECTION ONCE
Refills: 0 | Status: COMPLETED | OUTPATIENT
Start: 2021-01-29 | End: 2021-01-29

## 2021-01-29 RX ORDER — METOCLOPRAMIDE HCL 10 MG
10 TABLET ORAL ONCE
Refills: 0 | Status: COMPLETED | OUTPATIENT
Start: 2021-01-29 | End: 2021-01-29

## 2021-01-29 RX ADMIN — Medication 50 MILLIGRAM(S): at 17:20

## 2021-01-29 RX ADMIN — Medication 2 MILLIGRAM(S): at 18:41

## 2021-01-29 RX ADMIN — Medication 104 MILLIGRAM(S): at 18:50

## 2021-01-29 RX ADMIN — Medication 400 MILLIGRAM(S): at 19:45

## 2021-01-29 RX ADMIN — Medication 2 GRAM(S): at 20:33

## 2021-01-29 RX ADMIN — Medication 1 TABLET(S): at 21:55

## 2021-01-29 RX ADMIN — Medication 1 MILLIGRAM(S): at 21:55

## 2021-01-29 RX ADMIN — Medication 50 GRAM(S): at 18:21

## 2021-01-29 RX ADMIN — Medication 10 MILLIGRAM(S): at 20:33

## 2021-01-29 RX ADMIN — Medication 1000 MILLIGRAM(S): at 20:33

## 2021-01-29 RX ADMIN — ONDANSETRON 4 MILLIGRAM(S): 8 TABLET, FILM COATED ORAL at 17:20

## 2021-01-29 RX ADMIN — SODIUM CHLORIDE 1000 MILLILITER(S): 9 INJECTION INTRAMUSCULAR; INTRAVENOUS; SUBCUTANEOUS at 20:33

## 2021-01-29 RX ADMIN — SODIUM CHLORIDE 1000 MILLILITER(S): 9 INJECTION INTRAMUSCULAR; INTRAVENOUS; SUBCUTANEOUS at 17:21

## 2021-01-29 RX ADMIN — Medication 100 MILLIGRAM(S): at 21:56

## 2021-01-29 RX ADMIN — SODIUM CHLORIDE 150 MILLILITER(S): 9 INJECTION, SOLUTION INTRAVENOUS at 18:22

## 2021-01-29 RX ADMIN — Medication 40 MILLIEQUIVALENT(S): at 18:22

## 2021-01-29 NOTE — ED PROVIDER NOTE - CLINICAL SUMMARY MEDICAL DECISION MAKING FREE TEXT BOX
Pt p/w alcohol withdrawal. R/o underlying toxic / electrolyte / metabolic disturbances. Start hydration, Librium +/- benzos, monitor CIWA. Dispo pending w/u and clinical status

## 2021-01-29 NOTE — ED PROVIDER NOTE - PHYSICAL EXAMINATION
Limited PE performed in the setting of the COVID10 pandemic, in efforts to limit exposure and cross-contamination  Constitutional: Well appearing, well nourished, awake, alert, oriented to person, place, time/situation and in no apparent distress.  ENMT: Airway patent. + tongue fasciculations, dry MM  Eyes: Clear bilaterally, PERRL, EOMI  Cardiac: tachycardic, regular rhythm.   Respiratory: No increased WOB, tachypnea, hypoxia, or accessory mm use. Pt speaks in full sentences.   Gastrointestinal: Abd soft, NT, ND. No guarding, rebound, or rigidity.   Musculoskeletal: Range of motion is not limited  Neuro: Alert and oriented x 3, face symmetric and speech fluent. Nml gross motor movement, grossly non focal, +tremulous  Skin: Skin normal color for race, warm, dry and intact. No evidence of rash.  Psych: Alert and oriented to person, place, time/situation. normal mood and affect. no apparent risk to self or others. initial CIWA 15

## 2021-01-29 NOTE — H&P ADULT - PROBLEM SELECTOR PLAN 2
Noted AGMA on admission with a mixed metabolic alkalosis in likely in the setting of alcohol use, nausea and vomiting.  - IVF hydration with D5 1/2 NS  - Encourage PO intake

## 2021-01-29 NOTE — ED ADULT TRIAGE NOTE - CHIEF COMPLAINT QUOTE
LUQ pain x3days.  Pt also states she is in etoh withdrawal, last drink yesterday.  Everyday etoh drinker, mild tremors noted.  Usually 1 bottle of whiskey a day.

## 2021-01-29 NOTE — H&P ADULT - PROBLEM SELECTOR PLAN 1
CIWA 15 on admission s/p Ativan 2mg with improvement. Multiple admissions most recently 1/5 with good response to Librium and Ativan taper.  - Librium 50q 8hr  - Ativan PRN  - Zofran/Reglan for Nausea  - CIWA - High risk

## 2021-01-29 NOTE — H&P ADULT - NSHPLABSRESULTS_GEN_ALL_CORE
LABS:                        12.4   8.84  )-----------( 219      ( 29 Jan 2021 17:18 )             40.3     29 Jan 2021 17:18    140    |  91     |  8      ----------------------------<  113    3.1     |  31     |  0.59     Ca    10.1       29 Jan 2021 17:18  Mg     1.3       29 Jan 2021 17:18    TPro  8.6    /  Alb  4.9    /  TBili  0.7    /  DBili  x      /  AST  65     /  ALT  39     /  AlkPhos  68     29 Jan 2021 17:18

## 2021-01-29 NOTE — ED ADULT NURSE NOTE - OBJECTIVE STATEMENT
Patient A&Ox3, visibly tremulous, ambulatory, guarding abdomen, respirations even and unlabored, appears anxious. Patient reports ETOH withdrawal and LUQ pain accompanied by vomiting since this morning. Reports last drink at midnight, drinks "1 pint" of whiskey a day. Patient denies chest pain or SOB. Patient reports history of alcohol withdrawal. Current CIWA score 15, Dr. Armstrong aware, provider at bedside. Left AC 18g IV placed, labs drawn and sent.

## 2021-01-29 NOTE — ED PROVIDER NOTE - PROGRESS NOTE DETAILS
[facility] now  RONN now 11 s/p initial tx w/ Librium S/p Ativan, IVF, electrolytes / supportive care, CIWA improved to 4. Will admit to medicine for continued tx of w/d, AKA

## 2021-01-29 NOTE — ED PROVIDER NOTE - NS ED ROS FT
Constitutional: No fever or chills.   Eyes: No pain, blurry vision, or discharge.  ENMT: No hearing changes, pain, discharge or infections. No neck pain or stiffness.  Cardiac: No chest pain, SOB or edema. No chest pain with exertion.  Respiratory: No cough or respiratory distress. No hemoptysis. No history of asthma or RAD.  GI: See HPI  : No dysuria, frequency or burning.  MS: No myalgia, muscle weakness, joint pain or back pain.  Neuro: No focal weakness. No LOC.  Skin: No skin rash.   Endocrine: No history of thyroid disease or diabetes.  Except as documented in the HPI, all other systems are negative.

## 2021-01-29 NOTE — H&P ADULT - PROBLEM SELECTOR PLAN 3
LUQ pain radiating to the back with negative Lipase. DDx remains broad in setting of alcohol use concern for pancreatitis vs nephrolithiasis v diverticular pain  - f/u HCG to rule out pregnancy  - Consider CT imaging if pain persists  - IVF hydration and pain control

## 2021-01-29 NOTE — H&P ADULT - HISTORY OF PRESENT ILLNESS
30F Hypothyroid, Asthma, Alcohol abuse with history of withdrawal (no history of seizures, or DTs, multiple admissions most recent 1/5/21)-drinks approximately 1 liter of hard liqueur daily last drink yesterday presents with LUQ pain.     In the ED VS T98.3  /90 R18 99% on RA  Labs: WBC 8 | Hg/Hct 12/40 | Plt 219 | Na 140 K 3.1 | CO2 31 |   AST/ALT ALP 65/39 68 | AG 18 | VBG 7.53 Co2 39 HCo3 31 |  Alc <10 | Lipase 1.3 |     ED Course: Tylenol 1g, Ativan 2mg x1, Reglan 10, K 40meq, Reglan 10, 1L NS started on D5 1/2 NS   30F Hypothyroid, Asthma, Alcohol abuse with history of withdrawal (no history of seizures, or DTs, multiple admissions most recent 1/5/21)-drinks approximately 1 liter of hard liqueur daily last drink yesterday presents with LUQ pain. Reports that she has had the LUQ for 3 days that has improved, was initially worse with inspiration, and radiating to the back 4/10. She reports that after her recent discharge she continued to drink until yesterday and tried to go through the withdrawal by herself, however could not handle the "convulsions" on her own. Endorses nausea and vomiting without blood as well as watery diarrhea since yesterday. Denies fevers, seizures, chest pain, SOB, changes in mental status, weakness, numbness, tingling.      In the ED VS T98.3  /90 R18 99% on RA  Labs: WBC 8 | Hg/Hct 12/40 | Plt 219 | Na 140 K 3.1 | CO2 31 |   AST/ALT ALP 65/39 68 | AG 18 | VBG 7.53 Co2 39 HCo3 31 |  Alc <10 | Lipase 1.3 |     ED Course: Tylenol 1g, Ativan 2mg x1, Reglan 10, K 40meq, Reglan 10, 1L NS started on D5 1/2 NS

## 2021-01-29 NOTE — ED PROVIDER NOTE - CARE PLAN
Principal Discharge DX:	Alcohol withdrawal syndrome without complication  Secondary Diagnosis:	Alcoholic ketoacidosis

## 2021-01-29 NOTE — H&P ADULT - ASSESSMENT
30F Hypothyroid, Asthma, Alcohol abuse with history of withdrawal (no history of seizures, or DTs, multiple admissions most recent 1/5/21)-drinks approximately 1 liter of hard liqueur daily last drink yesterday presents with LUQ pain.

## 2021-01-29 NOTE — ED PROVIDER NOTE - OBJECTIVE STATEMENT
Pt w/ PMHx hyperthyroidism, alcohol abuse, ? cirrhosis vs fatty liver p/w alcohol w/d. Last drink 12 am. Pt c/o HA, n/v/d, tremors. No hx seizures. No SI / HI / AH / VH Pt w/ PMHx hyperthyroidism, alcohol abuse, ? cirrhosis vs fatty liver p/w alcohol w/d. Last drink 12 am. Pt c/o HA, n/v/d, tremors. No hx seizures. No SI / HI / AH / VH. Pt states she now wants to quit drinking and does not want to die. Denies substance abuse.

## 2021-01-30 DIAGNOSIS — R10.9 UNSPECIFIED ABDOMINAL PAIN: ICD-10-CM

## 2021-01-30 LAB
ALBUMIN SERPL ELPH-MCNC: 3.9 G/DL — SIGNIFICANT CHANGE UP (ref 3.3–5)
ALP SERPL-CCNC: 52 U/L — SIGNIFICANT CHANGE UP (ref 40–120)
ALT FLD-CCNC: 26 U/L — SIGNIFICANT CHANGE UP (ref 10–45)
ANION GAP SERPL CALC-SCNC: 10 MMOL/L — SIGNIFICANT CHANGE UP (ref 5–17)
ANION GAP SERPL CALC-SCNC: 12 MMOL/L — SIGNIFICANT CHANGE UP (ref 5–17)
APTT BLD: 33.7 SEC — SIGNIFICANT CHANGE UP (ref 27.5–35.5)
AST SERPL-CCNC: 40 U/L — SIGNIFICANT CHANGE UP (ref 10–40)
BASOPHILS # BLD AUTO: 0.03 K/UL — SIGNIFICANT CHANGE UP (ref 0–0.2)
BASOPHILS NFR BLD AUTO: 0.5 % — SIGNIFICANT CHANGE UP (ref 0–2)
BILIRUB SERPL-MCNC: 0.6 MG/DL — SIGNIFICANT CHANGE UP (ref 0.2–1.2)
BUN SERPL-MCNC: 2 MG/DL — LOW (ref 7–23)
BUN SERPL-MCNC: 3 MG/DL — LOW (ref 7–23)
CALCIUM SERPL-MCNC: 8.6 MG/DL — SIGNIFICANT CHANGE UP (ref 8.4–10.5)
CALCIUM SERPL-MCNC: 8.7 MG/DL — SIGNIFICANT CHANGE UP (ref 8.4–10.5)
CHLORIDE SERPL-SCNC: 101 MMOL/L — SIGNIFICANT CHANGE UP (ref 96–108)
CHLORIDE SERPL-SCNC: 97 MMOL/L — SIGNIFICANT CHANGE UP (ref 96–108)
CO2 SERPL-SCNC: 27 MMOL/L — SIGNIFICANT CHANGE UP (ref 22–31)
CO2 SERPL-SCNC: 30 MMOL/L — SIGNIFICANT CHANGE UP (ref 22–31)
CREAT SERPL-MCNC: 0.61 MG/DL — SIGNIFICANT CHANGE UP (ref 0.5–1.3)
CREAT SERPL-MCNC: 0.65 MG/DL — SIGNIFICANT CHANGE UP (ref 0.5–1.3)
EOSINOPHIL # BLD AUTO: 0.08 K/UL — SIGNIFICANT CHANGE UP (ref 0–0.5)
EOSINOPHIL NFR BLD AUTO: 1.3 % — SIGNIFICANT CHANGE UP (ref 0–6)
GLUCOSE SERPL-MCNC: 114 MG/DL — HIGH (ref 70–99)
GLUCOSE SERPL-MCNC: 148 MG/DL — HIGH (ref 70–99)
HCT VFR BLD CALC: 38.9 % — SIGNIFICANT CHANGE UP (ref 34.5–45)
HGB BLD-MCNC: 12 G/DL — SIGNIFICANT CHANGE UP (ref 11.5–15.5)
IMM GRANULOCYTES NFR BLD AUTO: 0.2 % — SIGNIFICANT CHANGE UP (ref 0–1.5)
INR BLD: 1.07 — SIGNIFICANT CHANGE UP (ref 0.88–1.16)
LYMPHOCYTES # BLD AUTO: 1.1 K/UL — SIGNIFICANT CHANGE UP (ref 1–3.3)
LYMPHOCYTES # BLD AUTO: 17.2 % — SIGNIFICANT CHANGE UP (ref 13–44)
MCHC RBC-ENTMCNC: 30.8 GM/DL — LOW (ref 32–36)
MCHC RBC-ENTMCNC: 31.3 PG — SIGNIFICANT CHANGE UP (ref 27–34)
MCV RBC AUTO: 101.3 FL — HIGH (ref 80–100)
MONOCYTES # BLD AUTO: 0.59 K/UL — SIGNIFICANT CHANGE UP (ref 0–0.9)
MONOCYTES NFR BLD AUTO: 9.2 % — SIGNIFICANT CHANGE UP (ref 2–14)
NEUTROPHILS # BLD AUTO: 4.58 K/UL — SIGNIFICANT CHANGE UP (ref 1.8–7.4)
NEUTROPHILS NFR BLD AUTO: 71.6 % — SIGNIFICANT CHANGE UP (ref 43–77)
NRBC # BLD: 0 /100 WBCS — SIGNIFICANT CHANGE UP (ref 0–0)
PLATELET # BLD AUTO: 177 K/UL — SIGNIFICANT CHANGE UP (ref 150–400)
POTASSIUM SERPL-MCNC: 2.9 MMOL/L — CRITICAL LOW (ref 3.5–5.3)
POTASSIUM SERPL-MCNC: 3.4 MMOL/L — LOW (ref 3.5–5.3)
POTASSIUM SERPL-SCNC: 2.9 MMOL/L — CRITICAL LOW (ref 3.5–5.3)
POTASSIUM SERPL-SCNC: 3.4 MMOL/L — LOW (ref 3.5–5.3)
PROT SERPL-MCNC: 6.7 G/DL — SIGNIFICANT CHANGE UP (ref 6–8.3)
PROTHROM AB SERPL-ACNC: 12.8 SEC — SIGNIFICANT CHANGE UP (ref 10.6–13.6)
RBC # BLD: 3.84 M/UL — SIGNIFICANT CHANGE UP (ref 3.8–5.2)
RBC # FLD: 16.9 % — HIGH (ref 10.3–14.5)
SODIUM SERPL-SCNC: 137 MMOL/L — SIGNIFICANT CHANGE UP (ref 135–145)
SODIUM SERPL-SCNC: 140 MMOL/L — SIGNIFICANT CHANGE UP (ref 135–145)
WBC # BLD: 6.39 K/UL — SIGNIFICANT CHANGE UP (ref 3.8–10.5)
WBC # FLD AUTO: 6.39 K/UL — SIGNIFICANT CHANGE UP (ref 3.8–10.5)

## 2021-01-30 PROCEDURE — 99233 SBSQ HOSP IP/OBS HIGH 50: CPT

## 2021-01-30 RX ORDER — POTASSIUM CHLORIDE 20 MEQ
10 PACKET (EA) ORAL ONCE
Refills: 0 | Status: COMPLETED | OUTPATIENT
Start: 2021-01-30 | End: 2021-01-30

## 2021-01-30 RX ORDER — ACETAMINOPHEN 500 MG
650 TABLET ORAL EVERY 6 HOURS
Refills: 0 | Status: DISCONTINUED | OUTPATIENT
Start: 2021-01-30 | End: 2021-01-31

## 2021-01-30 RX ORDER — ENOXAPARIN SODIUM 100 MG/ML
40 INJECTION SUBCUTANEOUS EVERY 24 HOURS
Refills: 0 | Status: DISCONTINUED | OUTPATIENT
Start: 2021-01-30 | End: 2021-01-31

## 2021-01-30 RX ORDER — POTASSIUM CHLORIDE 20 MEQ
20 PACKET (EA) ORAL ONCE
Refills: 0 | Status: COMPLETED | OUTPATIENT
Start: 2021-01-30 | End: 2021-01-30

## 2021-01-30 RX ADMIN — Medication 50 MILLIGRAM(S): at 21:47

## 2021-01-30 RX ADMIN — Medication 650 MILLIGRAM(S): at 23:55

## 2021-01-30 RX ADMIN — ONDANSETRON 4 MILLIGRAM(S): 8 TABLET, FILM COATED ORAL at 08:32

## 2021-01-30 RX ADMIN — Medication 100 MILLIEQUIVALENT(S): at 21:47

## 2021-01-30 RX ADMIN — Medication 150 MICROGRAM(S): at 05:32

## 2021-01-30 RX ADMIN — Medication 100 MILLIGRAM(S): at 13:59

## 2021-01-30 RX ADMIN — Medication 1 TABLET(S): at 13:59

## 2021-01-30 RX ADMIN — Medication 1 MILLIGRAM(S): at 13:59

## 2021-01-30 RX ADMIN — Medication 50 MILLIGRAM(S): at 05:32

## 2021-01-30 RX ADMIN — Medication 20 MILLIEQUIVALENT(S): at 10:22

## 2021-01-30 RX ADMIN — ONDANSETRON 4 MILLIGRAM(S): 8 TABLET, FILM COATED ORAL at 00:04

## 2021-01-30 RX ADMIN — Medication 50 MILLIEQUIVALENT(S): at 10:22

## 2021-01-30 RX ADMIN — ENOXAPARIN SODIUM 40 MILLIGRAM(S): 100 INJECTION SUBCUTANEOUS at 05:32

## 2021-01-30 RX ADMIN — Medication 50 MILLIGRAM(S): at 13:59

## 2021-01-30 NOTE — PROGRESS NOTE ADULT - PROBLEM SELECTOR PLAN 6
F: s/p 1L NS started on D5 1/2 NS  E:  Replee PRN K<4, Mg<2  N: Regular    DVT  GI    CODE:  FULL: F: s/p 1L NS started on D5 1/2 NS  E:  Replee PRN K<4, Mg<2  N: Regular    DVT: lovenox 40    FULL CODE

## 2021-01-30 NOTE — PROGRESS NOTE ADULT - SUBJECTIVE AND OBJECTIVE BOX
OVERNIGHT EVENTS: HONORIO    SUBJECTIVE / INTERVAL HPI: Patient seen and examined at bedside. She reports that her abdominal pain is better this morning, denies anxiety/headache/hallucinations.     VITAL SIGNS:  Vital Signs Last 24 Hrs  T(C): 36.6 (30 Jan 2021 08:29), Max: 37 (29 Jan 2021 18:57)  T(F): 97.8 (30 Jan 2021 08:29), Max: 98.6 (29 Jan 2021 18:57)  HR: 93 (30 Jan 2021 08:29) (80 - 103)  BP: 133/99 (30 Jan 2021 08:29) (120/80 - 167/90)  BP(mean): --  RR: 18 (30 Jan 2021 08:29) (17 - 18)  SpO2: 98% (30 Jan 2021 08:29) (97% - 99%)    PHYSICAL EXAM:    GENERAL: NAD, speaks in full sentences, no signs of respiratory distress  HEAD:  Atraumatic, Normocephalic  EYES: EOMI, PERRLA, conjunctiva and sclera clear  NECK: Supple, No JVD  CHEST/LUNG: Clear to auscultation bilaterally; No wheeze; No crackles; No accessory muscles used  HEART: Regular rate and rhythm; No murmurs;   ABDOMEN: Soft, Nontender, Nondistended; Bowel sounds present; No guarding  EXTREMITIES:  2+ Peripheral Pulses, No cyanosis or edema  PSYCH: AAOx3  NEUROLOGY: non-focal  SKIN: No rashes or lesions    MEDICATIONS:  MEDICATIONS  (STANDING):  chlordiazePOXIDE 50 milliGRAM(s) Oral every 8 hours  dextrose 5% + sodium chloride 0.45%. 1000 milliLiter(s) (150 mL/Hr) IV Continuous <Continuous>  enoxaparin Injectable 40 milliGRAM(s) SubCutaneous every 24 hours  folic acid 1 milliGRAM(s) Oral daily  levothyroxine 150 MICROGram(s) Oral daily  multivitamin 1 Tablet(s) Oral daily  thiamine 100 milliGRAM(s) Oral daily    MEDICATIONS  (PRN):  LORazepam   Injectable 2 milliGRAM(s) IV Push every 2 hours PRN CIWA >8  ondansetron Injectable 4 milliGRAM(s) IV Push every 8 hours PRN Nausea and/or Vomiting      ALLERGIES:  Allergies    No Known Drug Allergies  shellfish (Swelling)    Intolerances        LABS:                        12.0   6.39  )-----------( 177      ( 30 Jan 2021 07:47 )             38.9     01-30    137  |  97  |  3<L>  ----------------------------<  114<H>  2.9<LL>   |  30  |  0.61    Ca    8.7      30 Jan 2021 07:47  Mg     1.3     01-29    TPro  6.7  /  Alb  3.9  /  TBili  0.6  /  DBili  x   /  AST  40  /  ALT  26  /  AlkPhos  52  01-30    PT/INR - ( 30 Jan 2021 07:47 )   PT: 12.8 sec;   INR: 1.07          PTT - ( 30 Jan 2021 07:47 )  PTT:33.7 sec    CAPILLARY BLOOD GLUCOSE          RADIOLOGY & ADDITIONAL TESTS: Reviewed.    PLAN:

## 2021-01-30 NOTE — PROGRESS NOTE ADULT - PROBLEM SELECTOR PLAN 3
LUQ pain radiating to the back with negative Lipase. DDx remains broad in setting of alcohol use concern for pancreatitis vs nephrolithiasis v diverticular pain. Pain improved as of 1/30.   - HCG negative   - Consider CT imaging if pain persists  - IVF hydration and pain control

## 2021-01-31 ENCOUNTER — TRANSCRIPTION ENCOUNTER (OUTPATIENT)
Age: 31
End: 2021-01-31

## 2021-01-31 VITALS
HEART RATE: 69 BPM | DIASTOLIC BLOOD PRESSURE: 78 MMHG | SYSTOLIC BLOOD PRESSURE: 110 MMHG | RESPIRATION RATE: 16 BRPM | OXYGEN SATURATION: 99 % | TEMPERATURE: 98 F

## 2021-01-31 LAB
ALBUMIN SERPL ELPH-MCNC: 3.5 G/DL — SIGNIFICANT CHANGE UP (ref 3.3–5)
ALP SERPL-CCNC: 51 U/L — SIGNIFICANT CHANGE UP (ref 40–120)
ALT FLD-CCNC: 45 U/L — SIGNIFICANT CHANGE UP (ref 10–45)
ANION GAP SERPL CALC-SCNC: 10 MMOL/L — SIGNIFICANT CHANGE UP (ref 5–17)
AST SERPL-CCNC: 98 U/L — HIGH (ref 10–40)
BILIRUB SERPL-MCNC: 0.4 MG/DL — SIGNIFICANT CHANGE UP (ref 0.2–1.2)
BUN SERPL-MCNC: 3 MG/DL — LOW (ref 7–23)
CALCIUM SERPL-MCNC: 8.6 MG/DL — SIGNIFICANT CHANGE UP (ref 8.4–10.5)
CHLORIDE SERPL-SCNC: 102 MMOL/L — SIGNIFICANT CHANGE UP (ref 96–108)
CO2 SERPL-SCNC: 28 MMOL/L — SIGNIFICANT CHANGE UP (ref 22–31)
CREAT SERPL-MCNC: 0.65 MG/DL — SIGNIFICANT CHANGE UP (ref 0.5–1.3)
GLUCOSE SERPL-MCNC: 99 MG/DL — SIGNIFICANT CHANGE UP (ref 70–99)
HCT VFR BLD CALC: 37.2 % — SIGNIFICANT CHANGE UP (ref 34.5–45)
HGB BLD-MCNC: 11.2 G/DL — LOW (ref 11.5–15.5)
MAGNESIUM SERPL-MCNC: 1.6 MG/DL — SIGNIFICANT CHANGE UP (ref 1.6–2.6)
MCHC RBC-ENTMCNC: 30.1 GM/DL — LOW (ref 32–36)
MCHC RBC-ENTMCNC: 30.5 PG — SIGNIFICANT CHANGE UP (ref 27–34)
MCV RBC AUTO: 101.4 FL — HIGH (ref 80–100)
NRBC # BLD: 0 /100 WBCS — SIGNIFICANT CHANGE UP (ref 0–0)
PHOSPHATE SERPL-MCNC: 2.3 MG/DL — LOW (ref 2.5–4.5)
PLATELET # BLD AUTO: 145 K/UL — LOW (ref 150–400)
POTASSIUM SERPL-MCNC: 2.9 MMOL/L — CRITICAL LOW (ref 3.5–5.3)
POTASSIUM SERPL-SCNC: 2.9 MMOL/L — CRITICAL LOW (ref 3.5–5.3)
PROT SERPL-MCNC: 6.3 G/DL — SIGNIFICANT CHANGE UP (ref 6–8.3)
RBC # BLD: 3.67 M/UL — LOW (ref 3.8–5.2)
RBC # FLD: 16.8 % — HIGH (ref 10.3–14.5)
SODIUM SERPL-SCNC: 140 MMOL/L — SIGNIFICANT CHANGE UP (ref 135–145)
WBC # BLD: 6.9 K/UL — SIGNIFICANT CHANGE UP (ref 3.8–10.5)
WBC # FLD AUTO: 6.9 K/UL — SIGNIFICANT CHANGE UP (ref 3.8–10.5)

## 2021-01-31 PROCEDURE — 93005 ELECTROCARDIOGRAM TRACING: CPT

## 2021-01-31 PROCEDURE — 99239 HOSP IP/OBS DSCHRG MGMT >30: CPT

## 2021-01-31 PROCEDURE — 85025 COMPLETE CBC W/AUTO DIFF WBC: CPT

## 2021-01-31 PROCEDURE — 96374 THER/PROPH/DIAG INJ IV PUSH: CPT

## 2021-01-31 PROCEDURE — U0005: CPT

## 2021-01-31 PROCEDURE — 82803 BLOOD GASES ANY COMBINATION: CPT

## 2021-01-31 PROCEDURE — 80307 DRUG TEST PRSMV CHEM ANLYZR: CPT

## 2021-01-31 PROCEDURE — 85027 COMPLETE CBC AUTOMATED: CPT

## 2021-01-31 PROCEDURE — 80048 BASIC METABOLIC PNL TOTAL CA: CPT

## 2021-01-31 PROCEDURE — 83690 ASSAY OF LIPASE: CPT

## 2021-01-31 PROCEDURE — 36415 COLL VENOUS BLD VENIPUNCTURE: CPT

## 2021-01-31 PROCEDURE — 82330 ASSAY OF CALCIUM: CPT

## 2021-01-31 PROCEDURE — 84100 ASSAY OF PHOSPHORUS: CPT

## 2021-01-31 PROCEDURE — 99285 EMERGENCY DEPT VISIT HI MDM: CPT | Mod: 25

## 2021-01-31 PROCEDURE — 96375 TX/PRO/DX INJ NEW DRUG ADDON: CPT

## 2021-01-31 PROCEDURE — 83735 ASSAY OF MAGNESIUM: CPT

## 2021-01-31 PROCEDURE — 84132 ASSAY OF SERUM POTASSIUM: CPT

## 2021-01-31 PROCEDURE — 85610 PROTHROMBIN TIME: CPT

## 2021-01-31 PROCEDURE — 82010 KETONE BODYS QUAN: CPT

## 2021-01-31 PROCEDURE — 84295 ASSAY OF SERUM SODIUM: CPT

## 2021-01-31 PROCEDURE — U0003: CPT

## 2021-01-31 PROCEDURE — 83605 ASSAY OF LACTIC ACID: CPT

## 2021-01-31 PROCEDURE — 80053 COMPREHEN METABOLIC PANEL: CPT

## 2021-01-31 PROCEDURE — 85730 THROMBOPLASTIN TIME PARTIAL: CPT

## 2021-01-31 RX ORDER — THIAMINE MONONITRATE (VIT B1) 100 MG
1 TABLET ORAL
Qty: 0 | Refills: 0 | DISCHARGE
Start: 2021-01-31

## 2021-01-31 RX ORDER — ACETAMINOPHEN 500 MG
2 TABLET ORAL
Qty: 0 | Refills: 0 | DISCHARGE
Start: 2021-01-31

## 2021-01-31 RX ORDER — POTASSIUM CHLORIDE 20 MEQ
20 PACKET (EA) ORAL
Refills: 0 | Status: COMPLETED | OUTPATIENT
Start: 2021-01-31 | End: 2021-01-31

## 2021-01-31 RX ORDER — MAGNESIUM SULFATE 500 MG/ML
2 VIAL (ML) INJECTION ONCE
Refills: 0 | Status: COMPLETED | OUTPATIENT
Start: 2021-01-31 | End: 2021-01-31

## 2021-01-31 RX ADMIN — Medication 25 MILLIGRAM(S): at 12:37

## 2021-01-31 RX ADMIN — Medication 20 MILLIEQUIVALENT(S): at 10:21

## 2021-01-31 RX ADMIN — Medication 20 MILLIEQUIVALENT(S): at 12:37

## 2021-01-31 RX ADMIN — SODIUM CHLORIDE 150 MILLILITER(S): 9 INJECTION, SOLUTION INTRAVENOUS at 06:16

## 2021-01-31 RX ADMIN — Medication 20 MILLIEQUIVALENT(S): at 12:38

## 2021-01-31 RX ADMIN — Medication 150 MICROGRAM(S): at 06:16

## 2021-01-31 RX ADMIN — Medication 50 GRAM(S): at 11:44

## 2021-01-31 RX ADMIN — Medication 100 MILLIGRAM(S): at 10:21

## 2021-01-31 RX ADMIN — Medication 50 MILLIGRAM(S): at 06:16

## 2021-01-31 RX ADMIN — Medication 1 MILLIGRAM(S): at 10:22

## 2021-01-31 RX ADMIN — Medication 1 TABLET(S): at 10:22

## 2021-01-31 RX ADMIN — Medication 20 MILLIEQUIVALENT(S): at 12:39

## 2021-01-31 NOTE — DISCHARGE NOTE PROVIDER - NSDCFUADDAPPT_GEN_ALL_CORE_FT
Please find a primary care doctor as soon as possible and follow up with them.  Please find a primary care doctor as soon as possible and follow up with them. I have provided the name and the info of our out patient clinic. You can call [phone] and make an apt with them to follow up for all your medical problems.

## 2021-01-31 NOTE — DISCHARGE NOTE PROVIDER - NSDCMRMEDTOKEN_GEN_ALL_CORE_FT
acetaminophen 325 mg oral tablet: 2 tab(s) orally every 6 hours, As needed, Temp greater or equal to 38C (100.4F), Mild Pain (1 - 3)  albuterol 0.63 mg/3 mL (0.021%) inhalation solution: 3 milliliter(s) inhaled 3 times a day  chlordiazePOXIDE 25 mg oral capsule: 1 cap(s) orally every 12 hours MDD: 300 daily  2 tabs on Monday and 1 tab on Tuesday  folic acid 1 mg oral tablet: 1 tab(s) orally once a day  Multiple Vitamins oral tablet: 1 tab(s) orally once a day  naltrexone 50 mg oral tablet: 1 tab(s) orally once a day   Synthroid 150 mcg (0.15 mg) oral tablet: 1 tab(s) orally once a day  thiamine 100 mg oral tablet: 1 tab(s) orally once a day

## 2021-01-31 NOTE — DISCHARGE NOTE NURSING/CASE MANAGEMENT/SOCIAL WORK - NSDCFUADDAPPT_GEN_ALL_CORE_FT
Please find a primary care doctor as soon as possible and follow up with them. I have provided the name and the info of our out patient clinic. You can call [phone] and make an apt with them to follow up for all your medical problems.

## 2021-01-31 NOTE — DISCHARGE NOTE PROVIDER - HOSPITAL COURSE
#Discharge: do not delete  30F Hypothyroid, Asthma, Alcohol abuse with history of withdrawal (no history of seizures, or DTs, multiple admissions most recent 1/5/21)-drinks approximately 1 liter of hard liqueur daily last drink on 1/28th presents with LUQ pain and alcohol withdrawal. Pt was started on librium 50 q8 and CIWA protocol. the CIWA score remained low and she did not require ativan during the course of hospitalization. her abd CT was unremarkable and her BHCG was negative. Pt deemed stable for discharge with a few more doses of librium to tapar at home.      Problem List/Main Diagnoses (system-based):   # Alcohol withdrawal.  Plan: CIWA 15 on admission s/p Ativan 2mg with improvement. Multiple admissions most recently 1/5 with good response to Librium and Ativan taper.  - Librium 50q 8hr  - Ativan PRN  - Zofran/Reglan for Nausea  - CIWA - High risk.     #Alcoholic ketoacidosis.  RESOLVED   Plan: Noted AGMA on admission with a mixed metabolic alkalosis in likely in the setting of alcohol use, nausea and vomiting.  - IVF hydration with D5 1/2 NS  - Encouraged PO intake.     #Abdominal pain.  IMPROVED   Plan: LUQ pain radiating to the back with negative Lipase. DDx remains broad in setting of alcohol use concern for pancreatitis vs nephrolithiasis v diverticular pain. Pain improved as of 1/30.   - HCG negative   - CT unremarkable   - IVF hydration and pain control.     #Hypothyroid.  Plan: - c/w Synthroid.     #Asthma.  Plan: Uses albuterol monthly, mild intermittent.  - Duonebs PRN.       Inpatient treatment course: see above   New medications:   Labs to be followed outpatient: none   Exam to be followed outpatient: none   #Discharge: do not delete  30F Hypothyroid, Asthma, Alcohol abuse with history of withdrawal (no history of seizures, or DTs, multiple admissions most recent 1/5/21)-drinks approximately 1 liter of hard liqueur daily last drink on 1/28th presents with LUQ pain and alcohol withdrawal. Pt was started on librium 50 q8 and CIWA protocol. the CIWA score remained low and she did not require ativan during the course of hospitalization. her abd CT was unremarkable and her BHCG was negative. Pt deemed stable for discharge with a few more doses of librium to tapar at home.      Problem List/Main Diagnoses (system-based):   # Alcohol withdrawal.  Plan: CIWA 15 on admission s/p Ativan 2mg with improvement. Multiple admissions most recently 1/5 with good response to Librium and Ativan taper.  - Librium 50q 8hr  - Ativan PRN  - Zofran/Reglan for Nausea  - CIWA - High risk.     #Alcoholic ketoacidosis.  RESOLVED   Plan: Noted AGMA on admission with a mixed metabolic alkalosis in likely in the setting of alcohol use, nausea and vomiting.  - IVF hydration with D5 1/2 NS  - Encouraged PO intake.     #Abdominal pain.  IMPROVED   Plan: LUQ pain radiating to the back with negative Lipase. DDx remains broad in setting of alcohol use concern for pancreatitis vs nephrolithiasis v diverticular pain. Pain improved as of 1/30.   - HCG negative   - CT unremarkable   - IVF hydration and pain control.     #Hypothyroid.  Plan: - c/w Synthroid.     #Asthma.  Plan: Uses albuterol monthly, mild intermittent.  - Duonebs PRN.       Inpatient treatment course: see above   New medications: librium 25 mg every 12 hours for 3 doses   Labs to be followed outpatient: none   Exam to be followed outpatient: none

## 2021-01-31 NOTE — DISCHARGE NOTE NURSING/CASE MANAGEMENT/SOCIAL WORK - PATIENT PORTAL LINK FT
You can access the FollowMyHealth Patient Portal offered by [facility] by registering at the following website: [email or web address]. By joining [employer]’s FollowMyHealth portal, you will also be able to view your health information using other applications (apps) compatible with our system.

## 2021-01-31 NOTE — DISCHARGE NOTE PROVIDER - NSDCCPCAREPLAN_GEN_ALL_CORE_FT
PRINCIPAL DISCHARGE DIAGNOSIS  Diagnosis: Alcohol withdrawal syndrome without complication  Assessment and Plan of Treatment: You were admitted to the hospital for abdominal pain and were found to be withdrawing from heavy alcohol use. the amount of your usual alcohl use was concerning. We started you on librium 50 mg every 8 hours to control the risk of seizure. We were also actively checking your CIWA score to monitor your symptoms for alcohol withdrawal. Your score was constantly low and you did not require extra medications. Your abdominal pain also improved and the CT was unremarkable.   It it extremely important that you have a primary care doctor to f/u. the long term complications of alcohol abuse are significant and include chronic pancreatitis that can cause diabetes, wernike - korsekoff encephalitis that causes early dementia and gait instability and much more complications. Please pick a PCP and f/u with them for out patient management of your medical problems including your alcohol problem.  we are also sending you with librium taper. you need to take librium 25 mg twice a day for 1 day (on Monday) and 25 once on Tuesday. Please DO NOT use alcohol with librium because it affects your breathing and brain function and you will end up coming back to the hospital with respiratory distress.      SECONDARY DISCHARGE DIAGNOSES  Diagnosis: Alcoholic ketoacidosis  Assessment and Plan of Treatment: Resolved

## 2021-01-31 NOTE — DISCHARGE NOTE PROVIDER - NSFOLLOWUPCLINICS_GEN_ALL_ED_FT
[facility]  Family Medicine  [address]  [place], [place] [address]  Phone: [phone]  Fax:   Follow Up Time: 2 weeks

## 2021-02-01 ENCOUNTER — NON-APPOINTMENT (OUTPATIENT)
Age: 31
End: 2021-02-01

## 2021-02-02 ENCOUNTER — NON-APPOINTMENT (OUTPATIENT)
Age: 31
End: 2021-02-02

## 2021-02-05 DIAGNOSIS — J45.909 UNSPECIFIED ASTHMA, UNCOMPLICATED: ICD-10-CM

## 2021-02-05 DIAGNOSIS — F10.239 ALCOHOL DEPENDENCE WITH WITHDRAWAL, UNSPECIFIED: ICD-10-CM

## 2021-02-05 DIAGNOSIS — E87.2 ACIDOSIS: ICD-10-CM

## 2021-02-05 DIAGNOSIS — E03.9 HYPOTHYROIDISM, UNSPECIFIED: ICD-10-CM

## 2021-02-20 ENCOUNTER — APPOINTMENT (OUTPATIENT)
Dept: ULTRASOUND IMAGING | Facility: CLINIC | Age: 31
End: 2021-02-20

## 2021-03-11 ENCOUNTER — HOSPITAL ENCOUNTER (INPATIENT)
Dept: HOSPITAL 74 - YASAS | Age: 31
LOS: 3 days | Discharge: HOME | End: 2021-03-14
Attending: ALLERGY & IMMUNOLOGY | Admitting: ALLERGY & IMMUNOLOGY
Payer: COMMERCIAL

## 2021-03-11 VITALS — BODY MASS INDEX: 24.3 KG/M2

## 2021-03-11 DIAGNOSIS — J45.909: ICD-10-CM

## 2021-03-11 DIAGNOSIS — E03.9: ICD-10-CM

## 2021-03-11 DIAGNOSIS — F10.230: Primary | ICD-10-CM

## 2021-03-11 DIAGNOSIS — Z91.013: ICD-10-CM

## 2021-03-11 DIAGNOSIS — F17.210: ICD-10-CM

## 2021-03-11 DIAGNOSIS — F10.220: ICD-10-CM

## 2021-03-11 DIAGNOSIS — E87.6: ICD-10-CM

## 2021-03-11 PROCEDURE — U0003 INFECTIOUS AGENT DETECTION BY NUCLEIC ACID (DNA OR RNA); SEVERE ACUTE RESPIRATORY SYNDROME CORONAVIRUS 2 (SARS-COV-2) (CORONAVIRUS DISEASE [COVID-19]), AMPLIFIED PROBE TECHNIQUE, MAKING USE OF HIGH THROUGHPUT TECHNOLOGIES AS DESCRIBED BY CMS-2020-01-R: HCPCS

## 2021-03-11 PROCEDURE — C9803 HOPD COVID-19 SPEC COLLECT: HCPCS

## 2021-03-11 PROCEDURE — HZ2ZZZZ DETOXIFICATION SERVICES FOR SUBSTANCE ABUSE TREATMENT: ICD-10-PCS | Performed by: ALLERGY & IMMUNOLOGY

## 2021-03-11 RX ADMIN — Medication SCH MG: at 21:58

## 2021-03-11 RX ADMIN — HYDROXYZINE PAMOATE PRN MG: 25 CAPSULE ORAL at 20:22

## 2021-03-12 LAB
ALBUMIN SERPL-MCNC: 3 G/DL (ref 3.4–5)
ALP SERPL-CCNC: 66 U/L (ref 45–117)
ALT SERPL-CCNC: 55 U/L (ref 13–61)
ANION GAP SERPL CALC-SCNC: 9 MMOL/L (ref 8–16)
AST SERPL-CCNC: 65 U/L (ref 15–37)
BILIRUB SERPL-MCNC: 0.6 MG/DL (ref 0.2–1)
BUN SERPL-MCNC: 8.8 MG/DL (ref 7–18)
CALCIUM SERPL-MCNC: 8.3 MG/DL (ref 8.5–10.1)
CHLORIDE SERPL-SCNC: 100 MMOL/L (ref 98–107)
CO2 SERPL-SCNC: 32 MMOL/L (ref 21–32)
CREAT SERPL-MCNC: 0.7 MG/DL (ref 0.55–1.3)
DEPRECATED RDW RBC AUTO: 17.4 % (ref 11.6–15.6)
GLUCOSE SERPL-MCNC: 76 MG/DL (ref 74–106)
HCT VFR BLD CALC: 35.5 % (ref 32.4–45.2)
HGB BLD-MCNC: 11.5 GM/DL (ref 10.7–15.3)
HIV 1+2 AB+HIV1 P24 AG SERPL QL IA: NEGATIVE
MCH RBC QN AUTO: 31.5 PG (ref 25.7–33.7)
MCHC RBC AUTO-ENTMCNC: 32.4 G/DL (ref 32–36)
MCV RBC: 97.2 FL (ref 80–96)
PLATELET # BLD AUTO: 181 K/MM3 (ref 134–434)
PMV BLD: 7.9 FL (ref 7.5–11.1)
POTASSIUM SERPLBLD-SCNC: 3 MMOL/L (ref 3.5–5.1)
PROT SERPL-MCNC: 6.6 G/DL (ref 6.4–8.2)
RBC # BLD AUTO: 3.65 M/MM3 (ref 3.6–5.2)
SODIUM SERPL-SCNC: 141 MMOL/L (ref 136–145)
WBC # BLD AUTO: 4.4 K/MM3 (ref 4–10)

## 2021-03-12 RX ADMIN — Medication SCH MG: at 22:20

## 2021-03-12 RX ADMIN — HYDROXYZINE PAMOATE PRN MG: 25 CAPSULE ORAL at 22:21

## 2021-03-12 RX ADMIN — Medication SCH TAB: at 10:43

## 2021-03-12 RX ADMIN — HYDROXYZINE PAMOATE PRN MG: 25 CAPSULE ORAL at 10:45

## 2021-03-13 RX ADMIN — Medication SCH MG: at 22:06

## 2021-03-13 RX ADMIN — Medication SCH TAB: at 10:28

## 2021-03-13 RX ADMIN — Medication SCH MG: at 22:05

## 2021-03-13 RX ADMIN — LEVOTHYROXINE SODIUM SCH MCG: 100 TABLET ORAL at 07:50

## 2021-03-14 VITALS — DIASTOLIC BLOOD PRESSURE: 89 MMHG | SYSTOLIC BLOOD PRESSURE: 117 MMHG | HEART RATE: 51 BPM | TEMPERATURE: 97.1 F

## 2021-03-14 RX ADMIN — Medication SCH TAB: at 10:56

## 2021-03-14 RX ADMIN — LEVOTHYROXINE SODIUM SCH MCG: 100 TABLET ORAL at 07:00

## 2021-05-22 ENCOUNTER — INPATIENT (INPATIENT)
Facility: HOSPITAL | Age: 31
LOS: 1 days | Discharge: HOME | DRG: 897 | End: 2021-05-24
Attending: STUDENT IN AN ORGANIZED HEALTH CARE EDUCATION/TRAINING PROGRAM | Admitting: STUDENT IN AN ORGANIZED HEALTH CARE EDUCATION/TRAINING PROGRAM
Payer: COMMERCIAL

## 2021-05-22 VITALS
OXYGEN SATURATION: 95 % | HEIGHT: 62 IN | SYSTOLIC BLOOD PRESSURE: 117 MMHG | TEMPERATURE: 98 F | DIASTOLIC BLOOD PRESSURE: 87 MMHG | RESPIRATION RATE: 18 BRPM | WEIGHT: 134.04 LBS | HEART RATE: 99 BPM

## 2021-05-22 DIAGNOSIS — F10.230 ALCOHOL DEPENDENCE WITH WITHDRAWAL, UNCOMPLICATED: ICD-10-CM

## 2021-05-22 DIAGNOSIS — R94.31 ABNORMAL ELECTROCARDIOGRAM [ECG] [EKG]: ICD-10-CM

## 2021-05-22 DIAGNOSIS — E03.9 HYPOTHYROIDISM, UNSPECIFIED: ICD-10-CM

## 2021-05-22 DIAGNOSIS — Z00.00 ENCOUNTER FOR GENERAL ADULT MEDICAL EXAMINATION WITHOUT ABNORMAL FINDINGS: ICD-10-CM

## 2021-05-22 DIAGNOSIS — J45.909 UNSPECIFIED ASTHMA, UNCOMPLICATED: ICD-10-CM

## 2021-05-22 DIAGNOSIS — F10.10 ALCOHOL ABUSE, UNCOMPLICATED: ICD-10-CM

## 2021-05-22 LAB
ALBUMIN SERPL ELPH-MCNC: 4.2 G/DL — SIGNIFICANT CHANGE UP (ref 3.3–5)
ALP SERPL-CCNC: 67 U/L — SIGNIFICANT CHANGE UP (ref 40–120)
ALT FLD-CCNC: 38 U/L — SIGNIFICANT CHANGE UP (ref 10–45)
ANION GAP SERPL CALC-SCNC: 15 MMOL/L — SIGNIFICANT CHANGE UP (ref 5–17)
AST SERPL-CCNC: 49 U/L — HIGH (ref 10–40)
BASOPHILS # BLD AUTO: 0.06 K/UL — SIGNIFICANT CHANGE UP (ref 0–0.2)
BASOPHILS NFR BLD AUTO: 0.5 % — SIGNIFICANT CHANGE UP (ref 0–2)
BILIRUB SERPL-MCNC: 0.5 MG/DL — SIGNIFICANT CHANGE UP (ref 0.2–1.2)
BUN SERPL-MCNC: 11 MG/DL — SIGNIFICANT CHANGE UP (ref 7–23)
CALCIUM SERPL-MCNC: 8 MG/DL — LOW (ref 8.4–10.5)
CHLORIDE SERPL-SCNC: 103 MMOL/L — SIGNIFICANT CHANGE UP (ref 96–108)
CO2 SERPL-SCNC: 25 MMOL/L — SIGNIFICANT CHANGE UP (ref 22–31)
CREAT SERPL-MCNC: 0.84 MG/DL — SIGNIFICANT CHANGE UP (ref 0.5–1.3)
EOSINOPHIL # BLD AUTO: 0.2 K/UL — SIGNIFICANT CHANGE UP (ref 0–0.5)
EOSINOPHIL NFR BLD AUTO: 1.5 % — SIGNIFICANT CHANGE UP (ref 0–6)
ETHANOL SERPL-MCNC: 113 MG/DL — HIGH (ref 0–10)
GLUCOSE SERPL-MCNC: 83 MG/DL — SIGNIFICANT CHANGE UP (ref 70–99)
HCT VFR BLD CALC: 37.6 % — SIGNIFICANT CHANGE UP (ref 34.5–45)
HGB BLD-MCNC: 11.6 G/DL — SIGNIFICANT CHANGE UP (ref 11.5–15.5)
IMM GRANULOCYTES NFR BLD AUTO: 0.4 % — SIGNIFICANT CHANGE UP (ref 0–1.5)
LYMPHOCYTES # BLD AUTO: 1.41 K/UL — SIGNIFICANT CHANGE UP (ref 1–3.3)
LYMPHOCYTES # BLD AUTO: 10.9 % — LOW (ref 13–44)
MAGNESIUM SERPL-MCNC: 1.7 MG/DL — SIGNIFICANT CHANGE UP (ref 1.6–2.6)
MCHC RBC-ENTMCNC: 30.8 PG — SIGNIFICANT CHANGE UP (ref 27–34)
MCHC RBC-ENTMCNC: 30.9 GM/DL — LOW (ref 32–36)
MCV RBC AUTO: 99.7 FL — SIGNIFICANT CHANGE UP (ref 80–100)
MONOCYTES # BLD AUTO: 0.81 K/UL — SIGNIFICANT CHANGE UP (ref 0–0.9)
MONOCYTES NFR BLD AUTO: 6.3 % — SIGNIFICANT CHANGE UP (ref 2–14)
NEUTROPHILS # BLD AUTO: 10.43 K/UL — HIGH (ref 1.8–7.4)
NEUTROPHILS NFR BLD AUTO: 80.4 % — HIGH (ref 43–77)
NRBC # BLD: 0 /100 WBCS — SIGNIFICANT CHANGE UP (ref 0–0)
PLATELET # BLD AUTO: 238 K/UL — SIGNIFICANT CHANGE UP (ref 150–400)
POTASSIUM SERPL-MCNC: 3.7 MMOL/L — SIGNIFICANT CHANGE UP (ref 3.5–5.3)
POTASSIUM SERPL-SCNC: 3.7 MMOL/L — SIGNIFICANT CHANGE UP (ref 3.5–5.3)
PROT SERPL-MCNC: 7.6 G/DL — SIGNIFICANT CHANGE UP (ref 6–8.3)
RBC # BLD: 3.77 M/UL — LOW (ref 3.8–5.2)
RBC # FLD: 15.9 % — HIGH (ref 10.3–14.5)
SARS-COV-2 RNA SPEC QL NAA+PROBE: SIGNIFICANT CHANGE UP
SODIUM SERPL-SCNC: 143 MMOL/L — SIGNIFICANT CHANGE UP (ref 135–145)
T4 FREE SERPL-MCNC: 0.34 NG/DL — LOW (ref 0.93–1.7)
TSH SERPL-MCNC: 59.78 UIU/ML — HIGH (ref 0.27–4.2)
WBC # BLD: 12.96 K/UL — HIGH (ref 3.8–10.5)
WBC # FLD AUTO: 12.96 K/UL — HIGH (ref 3.8–10.5)

## 2021-05-22 PROCEDURE — 99223 1ST HOSP IP/OBS HIGH 75: CPT | Mod: GC

## 2021-05-22 PROCEDURE — 99285 EMERGENCY DEPT VISIT HI MDM: CPT | Mod: 25

## 2021-05-22 PROCEDURE — 93010 ELECTROCARDIOGRAM REPORT: CPT

## 2021-05-22 RX ORDER — ACETAMINOPHEN 500 MG
650 TABLET ORAL EVERY 6 HOURS
Refills: 0 | Status: DISCONTINUED | OUTPATIENT
Start: 2021-05-22 | End: 2021-05-24

## 2021-05-22 RX ORDER — LEVOTHYROXINE SODIUM 125 MCG
150 TABLET ORAL DAILY
Refills: 0 | Status: DISCONTINUED | OUTPATIENT
Start: 2021-05-22 | End: 2021-05-24

## 2021-05-22 RX ORDER — THIAMINE MONONITRATE (VIT B1) 100 MG
100 TABLET ORAL ONCE
Refills: 0 | Status: COMPLETED | OUTPATIENT
Start: 2021-05-22 | End: 2021-05-22

## 2021-05-22 RX ORDER — SODIUM CHLORIDE 9 MG/ML
1000 INJECTION INTRAMUSCULAR; INTRAVENOUS; SUBCUTANEOUS ONCE
Refills: 0 | Status: COMPLETED | OUTPATIENT
Start: 2021-05-22 | End: 2021-05-22

## 2021-05-22 RX ORDER — FOLIC ACID 0.8 MG
1 TABLET ORAL ONCE
Refills: 0 | Status: COMPLETED | OUTPATIENT
Start: 2021-05-22 | End: 2021-05-22

## 2021-05-22 RX ORDER — THIAMINE MONONITRATE (VIT B1) 100 MG
100 TABLET ORAL DAILY
Refills: 0 | Status: DISCONTINUED | OUTPATIENT
Start: 2021-05-22 | End: 2021-05-24

## 2021-05-22 RX ORDER — IBUPROFEN 200 MG
200 TABLET ORAL EVERY 6 HOURS
Refills: 0 | Status: DISCONTINUED | OUTPATIENT
Start: 2021-05-22 | End: 2021-05-24

## 2021-05-22 RX ORDER — ONDANSETRON 8 MG/1
4 TABLET, FILM COATED ORAL ONCE
Refills: 0 | Status: COMPLETED | OUTPATIENT
Start: 2021-05-22 | End: 2021-05-22

## 2021-05-22 RX ADMIN — SODIUM CHLORIDE 1000 MILLILITER(S): 9 INJECTION INTRAMUSCULAR; INTRAVENOUS; SUBCUTANEOUS at 11:48

## 2021-05-22 RX ADMIN — Medication 1 TABLET(S): at 13:33

## 2021-05-22 RX ADMIN — Medication 150 MICROGRAM(S): at 18:25

## 2021-05-22 RX ADMIN — Medication 50 MILLIGRAM(S): at 11:42

## 2021-05-22 RX ADMIN — Medication 1 MILLIGRAM(S): at 13:33

## 2021-05-22 RX ADMIN — Medication 75 MILLIGRAM(S): at 18:24

## 2021-05-22 RX ADMIN — Medication 100 MILLIGRAM(S): at 13:33

## 2021-05-22 RX ADMIN — SODIUM CHLORIDE 1000 MILLILITER(S): 9 INJECTION INTRAMUSCULAR; INTRAVENOUS; SUBCUTANEOUS at 13:33

## 2021-05-22 RX ADMIN — Medication 1 MILLIGRAM(S): at 13:01

## 2021-05-22 RX ADMIN — ONDANSETRON 4 MILLIGRAM(S): 8 TABLET, FILM COATED ORAL at 11:44

## 2021-05-22 NOTE — ED ADULT TRIAGE NOTE - OTHER COMPLAINTS
reports daily drinker, last drink yesterday. went to [facility] yesterday and given fluids and dc. reports n/v tremors. FS80

## 2021-05-22 NOTE — H&P ADULT - ASSESSMENT
30yoF with a PMH of etoh abuse, hypothyroidism, mild intermittent asthma presents with tremors which started when she stopped drinking roughly 24hrs ago

## 2021-05-22 NOTE — ED PROVIDER NOTE - PHYSICAL EXAMINATION
VITAL SIGNS: I have reviewed nursing notes and confirm.  CONSTITUTIONAL: Well-developed; well-nourished; in no acute distress.   SKIN:  Warm and dry, no acute rash.   HEAD:  normocephalic, atraumatic.  EYES: PERRL.  EOM intact; conjunctiva and sclera clear.  ENT: No nasal discharge; airway clear.   NECK: Supple; non tender.  CARD: S1, S2 normal; no murmurs, gallops, or rubs. Regular rate and rhythm.   RESP:  Clear to auscultation b/l, no wheezes, rales or rhonchi.  ABD: Normal bowel sounds; soft; non-distended; non-tender; no guarding/rebound.  EXT: Normal ROM. No clubbing, cyanosis or edema. 2+ pulses to b/l ue/le.  NEURO: No gross tremor.  Alert, oriented, grossly unremarkable.  5/5 strength x 4 extremities against gravity and external force.  No drift x 4 extremities.  Sensation intact and symmetric x 4 extremities.  No facial asymmetry.    PSYCH: Mildly anxious appearing, cooperative, mood and affect appropriate.

## 2021-05-22 NOTE — H&P ADULT - ATTENDING COMMENTS
30F w EtOH abuse (1 pint/d whiskey), hypothyroidism, asthma on PRN albuterol p/w tremulousness w desire to detox, also reporting R Ankle sprain    ED: Librium 50, ativan 1mg. Pt seen and examined on 7u. States she is feeling slightly less tremulous. No nausea, anxiety. Reports pain in R ankle which she states she sprained yesterday. Erythema wo ecchymosis on R lateral malleolus. Tenderness along ATFL     #EtOH w/d – 8pm 5/21; ADENIKE in   #Leukocytosis - possibly reactive in setting of recent R Ankle injury. Appears non-toxic  #Hypothyroidism - on synthroid  #prolonged QTC: 486 – will need add on Mg. Keep K/Mg > 4 and 2, respectively  #R ankle sprain - states she is able to bear wt but has been limping    --CIWA, High risk for w/d - Librium 75mg TID w PRN ativan  --Folate, Thiamine PO supplementation  --TSH/FT4  --XR ankle; RICE, PRN tylenol  --Would add VTE PPx w SQL since pt w recent trauma, decreased mobility  --SW C/S - pt interested in outpatient substance abuse programs    DISPO: ZULEMA. Anticipate DC MON-TUE 5/24-25

## 2021-05-22 NOTE — H&P ADULT - PROBLEM SELECTOR PLAN 2
- not currently in a program but has been to one outpatient rehab and more recently an inpatient rehab  - was receiving vivitrol shots from a clinic but stopped going  - is interested in getting help after detoxing

## 2021-05-22 NOTE — ED ADULT NURSE NOTE - OTHER COMPLAINTS
reports daily drinker, last drink yesterday. went to [facility] yesterday and given fluids and dc. reports n/v tremors. FS80

## 2021-05-22 NOTE — ED PROVIDER NOTE - CARE PLAN
Principal Discharge DX:	Alcohol withdrawal syndrome without complication  Secondary Diagnosis:	Nausea  Secondary Diagnosis:	Alcohol abuse

## 2021-05-22 NOTE — H&P ADULT - NSHPREVIEWOFSYSTEMS_GEN_ALL_CORE
CONSTITUTIONAL: No weakness, fevers or chills  EYES/ENT: No visual changes;  No vertigo or throat pain   NECK: No pain or stiffness  RESPIRATORY: No cough, wheezing, hemoptysis; No shortness of breath  CARDIOVASCULAR: No chest pain or palpitations  GASTROINTESTINAL: No abdominal or epigastric pain. No nausea, vomiting, or hematemesis; No diarrhea or constipation. No melena or hematochezia.  GENITOURINARY: No dysuria, frequency or hematuria  NEUROLOGICAL: +tremors  SKIN: No itching, burning, rashes, or lesions   All other review of systems is negative unless indicated above.

## 2021-05-22 NOTE — H&P ADULT - PROBLEM SELECTOR PLAN 1
- drinks 1 pint per day last drink 5/21/21 at 8:00PM, pt arrived to ED with a ADENIKE of 113 complaining of tremors  - has had four prior admissions for etoh w/d, never been intubated   - CIWA by my exam 2 for tremors only. Pt had gotten ativan 1mg IVP x1 and librium 50mg PO x1 by that point  - - drinks 1 pint per day last drink 5/21/21 at 8:00PM, pt arrived to ED with a ADENIKE of 113 complaining of tremors  - has had four prior admissions for etoh w/d, never been intubated   - CIWA by my exam 2 for tremors only. Pt had gotten ativan 1mg IVP x1 and librium 50mg PO x1 by that point  - no e/o wernickes. History negative for gait problems or worsening memory. Exam negative for horizontal nystagmus  PLAN  - librium 75q8  - ativan 2mg IVP PRN  - CIWA checks  - B 12  - folate  - thiamine

## 2021-05-22 NOTE — H&P ADULT - PROBLEM SELECTOR PLAN 4
- h/o mild intermittent asthma not currently in an exacerbation  - can give PRN albuterol HFA if needed

## 2021-05-22 NOTE — ED PROVIDER NOTE - NS ED ROS FT
Constitutional: No fever or chills.   Eyes: No pain, blurry vision, or discharge.  ENMT: No hearing changes, pain, discharge or infections. No neck pain or stiffness.  Cardiac: No chest pain, SOB or edema. No chest pain with exertion.  Respiratory: No cough or respiratory distress. No hemoptysis. No history of asthma or RAD.  GI: + nausea, no vomiting, diarrhea or abdominal pain.  : No dysuria, frequency or burning.  MS: No myalgia, muscle weakness, joint pain or back pain.  Neuro: No headache or weakness. No LOC.  Skin: No skin rash.   Endocrine: No history of thyroid disease or diabetes.  Except as documented in the HPI, all other systems are negative.

## 2021-05-22 NOTE — ED PROVIDER NOTE - CLINICAL SUMMARY MEDICAL DECISION MAKING FREE TEXT BOX
Patient in ED w concern for ETOH w/d.  Patient is given librium, ativan on arrival to ED in addition to IVF bolus, anti emetic, MV, etc.  Patient monitored and feeling some improvement in symptoms.  Given refractory symptoms following discahrge from ED yesterday, will admit today for continued benzos, anti emetic and close monitoring.  Patient is aware of plan and in agreement.  Will admit at this time.

## 2021-05-22 NOTE — H&P ADULT - HISTORY OF PRESENT ILLNESS
30yoF with a PMH of etoh abuse, hypothyroidism, mild intermittent asthma presents with tremors which started when she stopped drinking. Last drink yesterday 8PM, drinks one pint of Dickson [place]inda whiskey daily. Has had four prior admissions for alcohol withdrawal in the past. Denies ever getting intubated. Is motivated to quit. Has been to one inpatient and one outpatient rehab. Is not currently in a 12 step program. She was however going to a clinic and getting naltrexone (vivitrol) injections but missed her third because she didn't feel like getting it. ROS + for tremors but negative for issues with memory, gait, or incontinence.     PMH: as above  meds: albuterol HFA, levothyroxine 150mcg  PSH: denies  Allergies: shellfish anaphylaxis  SH: denies drugs or tobacco. Is sexually active with men, uses condoms    ED  VS:  Labs:  EKG NSR HR 82 , QRS 84, QTc 486, normal axis  CXR clear  Intervention: librium 50mg PO x1 + ativan 1mg IVP x1 30yoF with a PMH of etoh abuse, hypothyroidism, mild intermittent asthma presents with tremors which started when she stopped drinking. Last drink yesterday 8PM, drinks one pint of Dickson Malinda whiskey daily. Has had four prior admissions for alcohol withdrawal in the past. Denies ever getting intubated. Is motivated to quit. Has been to one inpatient and one outpatient rehab. Is not currently in a 12 step program. She was however going to a clinic and getting naltrexone (vivitrol) injections but missed her third because she didn't feel like getting it. ROS + for tremors but negative for issues with memory, gait, or incontinence.     PMH: as above  meds: albuterol HFA, levothyroxine 150mcg  PSH: denies  Allergies: shellfish anaphylaxis  SH: denies drugs or tobacco. Is sexually active with men, uses condoms    ED  VS: afebrile VSS  Labs s/g WBC 12 neutrophilic predominance, Ca 8.0 but CMP otherwise WNL, ADENIKE on arrival 113  EKG NSR HR 82 , QRS 84, QTc 486, normal axis  CXR clear  Intervention: librium 50mg PO x1 + ativan 1mg IVP x1 30yoF with a PMH of etoh abuse, hypothyroidism, mild intermittent asthma presents with tremors which started when she stopped drinking. Last drink yesterday 8PM, drinks one pint of Dickson Malinda whiskey daily. Has had four prior admissions for alcohol withdrawal in the past. Denies ever getting intubated. Is motivated to quit. Has been to one inpatient and one outpatient rehab. Is not currently in a 12 step program. She was however going to a clinic and getting naltrexone (vivitrol) injections but missed her third because she didn't feel like getting it. ROS + for tremors but negative for issues with memory, gait, or incontinence.     PMH: as above  meds: albuterol HFA, levothyroxine 150mcg, naltrexone injections  PSH: denies  Allergies: shellfish anaphylaxis  SH: denies drugs or tobacco. Is sexually active with men, uses condoms    ED  VS: afebrile VSS  Labs s/g WBC 12 neutrophilic predominance, Ca 8.0 but CMP otherwise WNL, ADENIKE on arrival 113  EKG NSR HR 82 , QRS 84, QTc 486, normal axis  CXR clear  Intervention: librium 50mg PO x1 + ativan 1mg IVP x1 30yoF with a PMH of etoh abuse, hypothyroidism, mild intermittent asthma presents with tremors which started when she stopped drinking. Last drink yesterday 8PM, drinks one pint of Dickson Malinda whiskey daily. Has had four prior admissions for alcohol withdrawal in the past. Denies ever getting intubated. Is motivated to quit. Has been to one inpatient and one outpatient rehab. Is not currently in a 12 step program. She was however going to a clinic and getting naltrexone (vivitrol) injections but missed her third because she didn't feel like getting it. ROS + for tremors but negative for issues with memory, gait, or incontinence.     Prior Medical History  PMH: as above  meds: albuterol HFA, levothyroxine 150mcg, naltrexone injections  PSH: denies  Allergies: shellfish anaphylaxis  SH: denies drugs or tobacco. Is sexually active with men, uses condoms    ED Course  VS: afebrile VSS  Labs s/g WBC 12 neutrophilic predominance, Ca 8.0 but CMP otherwise WNL, ADENIKE on arrival 113  EKG NSR HR 82 , QRS 84, QTc 486, normal axis  CXR clear  Intervention: librium 50mg PO x1 + ativan 1mg IVP x1 30yoF with a PMH of etoh abuse, hypothyroidism, mild intermittent asthma presents with tremors which started when she stopped drinking. Last drink yesterday 8PM, drinks one pint of Dickson [place] whiskey daily. Has had four prior admissions for alcohol withdrawal in the past. Denies ever getting intubated. Is motivated to quit. Has been to one inpatient and one outpatient rehab. Is not currently in a 12 step program. She was however going to a clinic and getting naltrexone (vivitrol) injections but missed her third because she didn't feel like getting it. ROS + for tremors but negative for issues with memory, gait, or incontinence.     Prior Medical History  PMH: as above  meds: albuterol HFA, levothyroxine 150mcg, naltrexone injections  PSH: denies  Allergies: shellfish anaphylaxis  SH: denies drugs or tobacco. Is sexually active with men, uses condoms  SH: no pertinent family history    ED Course  VS: afebrile VSS  Labs s/g WBC 12 neutrophilic predominance, Ca 8.0 but CMP otherwise WNL, ADENIKE on arrival 113  EKG NSR HR 82 , QRS 84, QTc 486, normal axis  CXR clear  Intervention: librium 50mg PO x1 + ativan 1mg IVP x1 30yoF with a PMH of etoh abuse, hypothyroidism, mild intermittent asthma presents with tremors which started when she stopped drinking. Last drink yesterday 8PM, drinks one pint of Dickson Malinda whiskey daily. Has had four prior admissions for alcohol withdrawal in the past. Denies ever getting intubated. Is motivated to quit. Has been to one inpatient and one outpatient rehab. Is not currently in a 12 step program. She was however going to a clinic and getting naltrexone (vivitrol) injections but missed her third because she didn't feel like getting it. ROS + for tremors but negative for issues with memory, gait, or incontinence.     Prior Medical History  PMH: as above  meds: albuterol HFA, levothyroxine 150mcg, naltrexone injections  PSH: denies  Allergies: shellfish anaphylaxis  SH: denies drugs or tobacco. Is sexually active with men, uses condoms      ED Course  VS: afebrile VSS  Labs s/g WBC 12 neutrophilic predominance, Ca 8.0 but CMP otherwise WNL, ADENIKE on arrival 113  EKG NSR HR 82 , QRS 84, QTc 486, normal axis  CXR clear  Intervention: librium 50mg PO x1 + ativan 1mg IVP x1

## 2021-05-22 NOTE — H&P ADULT - NSHPLABSRESULTS_GEN_ALL_CORE
11.6   12.96 )-----------( 238      ( 22 May 2021 11:50 )             37.6       05-22    143  |  103  |  11  ----------------------------<  83  3.7   |  25  |  0.84    Ca    8.0<L>      22 May 2021 11:50    TPro  7.6  /  Alb  4.2  /  TBili  0.5  /  DBili  x   /  AST  49<H>  /  ALT  38  /  AlkPhos  67  05-22                      Lactate Trend            CAPILLARY BLOOD GLUCOSE      POCT Blood Glucose.: 80 mg/dL (22 May 2021 11:14)

## 2021-05-22 NOTE — H&P ADULT - NSHPPHYSICALEXAM_GEN_ALL_CORE
PHYSICAL EXAM:  GENERAL: NAD, well-developed  HEAD:  Atraumatic, Normocephalic. +tongue fasciculations   EYES: EOMI, PERRLA, conjunctiva and sclera clear  NECK: Supple, No JVD  CHEST/LUNG: Clear to auscultation bilaterally; No wheeze  HEART: Regular rate and rhythm; No murmurs, rubs, or gallops  ABDOMEN: Soft, Nontender, Nondistended; Bowel sounds present  EXTREMITIES:, No clubbing, cyanosis, or edema  PSYCH: AAOx3  NEUROLOGY: non-focal  SKIN: No rashes or lesions

## 2021-05-22 NOTE — ED PROVIDER NOTE - OBJECTIVE STATEMENT
30 year old female with history of asthma, hypothyroidism, ETOH abuse since the age of 15 (drinks apx 1 pint/day) presents to ED with concern for ETOH w/d.  Patient notes her last drink was yesterday afternoon.  She was seen in another area ED yesterday and following hydration, etc states she was discharged.  She is requesting admission, stating she cannot tolerate going home as she continues to withdrawal and is feeling worse.  She denies associated fever, chills, chest pain, shortness of breath, abdominal pain, emesis, changes to bowel movements, peripheral edema, rashes, recent travel, known sick contacts or any additional acute complaints or concerns at this time.

## 2021-05-22 NOTE — H&P ADULT - PROBLEM SELECTOR PLAN 5
.  F: none  E: PRN  N: regular  DVT: none  GI: none  bowel: none  glucose: none  pain: tylenol  dispo: [facility]  code: .  F: none  E: f/u Mag  N: regular  DVT: none  GI: none  bowel: none  glucose: none  pain: tylenol  dispo: [facility]  code:

## 2021-05-23 LAB
ALBUMIN SERPL ELPH-MCNC: 3.4 G/DL — SIGNIFICANT CHANGE UP (ref 3.3–5)
ALP SERPL-CCNC: 65 U/L — SIGNIFICANT CHANGE UP (ref 40–120)
ALT FLD-CCNC: 27 U/L — SIGNIFICANT CHANGE UP (ref 10–45)
AMYLASE P1 CFR SERPL: 35 U/L — SIGNIFICANT CHANGE UP (ref 25–125)
ANION GAP SERPL CALC-SCNC: 11 MMOL/L — SIGNIFICANT CHANGE UP (ref 5–17)
AST SERPL-CCNC: 32 U/L — SIGNIFICANT CHANGE UP (ref 10–40)
BILIRUB SERPL-MCNC: 0.6 MG/DL — SIGNIFICANT CHANGE UP (ref 0.2–1.2)
BUN SERPL-MCNC: 8 MG/DL — SIGNIFICANT CHANGE UP (ref 7–23)
CALCIUM SERPL-MCNC: 8.2 MG/DL — LOW (ref 8.4–10.5)
CHLORIDE SERPL-SCNC: 101 MMOL/L — SIGNIFICANT CHANGE UP (ref 96–108)
CO2 SERPL-SCNC: 26 MMOL/L — SIGNIFICANT CHANGE UP (ref 22–31)
COVID-19 SPIKE DOMAIN AB INTERP: NEGATIVE — SIGNIFICANT CHANGE UP
COVID-19 SPIKE DOMAIN ANTIBODY RESULT: 0.4 U/ML — SIGNIFICANT CHANGE UP
CREAT SERPL-MCNC: 0.81 MG/DL — SIGNIFICANT CHANGE UP (ref 0.5–1.3)
GLUCOSE SERPL-MCNC: 79 MG/DL — SIGNIFICANT CHANGE UP (ref 70–99)
HCT VFR BLD CALC: 38.1 % — SIGNIFICANT CHANGE UP (ref 34.5–45)
HGB BLD-MCNC: 11.8 G/DL — SIGNIFICANT CHANGE UP (ref 11.5–15.5)
LIDOCAIN IGE QN: 30 U/L — SIGNIFICANT CHANGE UP (ref 7–60)
MAGNESIUM SERPL-MCNC: 1.5 MG/DL — LOW (ref 1.6–2.6)
MCHC RBC-ENTMCNC: 31 GM/DL — LOW (ref 32–36)
MCHC RBC-ENTMCNC: 31 PG — SIGNIFICANT CHANGE UP (ref 27–34)
MCV RBC AUTO: 100 FL — SIGNIFICANT CHANGE UP (ref 80–100)
NRBC # BLD: 0 /100 WBCS — SIGNIFICANT CHANGE UP (ref 0–0)
PHOSPHATE SERPL-MCNC: 2.5 MG/DL — SIGNIFICANT CHANGE UP (ref 2.5–4.5)
PLATELET # BLD AUTO: 227 K/UL — SIGNIFICANT CHANGE UP (ref 150–400)
POTASSIUM SERPL-MCNC: 3.4 MMOL/L — LOW (ref 3.5–5.3)
POTASSIUM SERPL-SCNC: 3.4 MMOL/L — LOW (ref 3.5–5.3)
PROT SERPL-MCNC: 6.4 G/DL — SIGNIFICANT CHANGE UP (ref 6–8.3)
RBC # BLD: 3.81 M/UL — SIGNIFICANT CHANGE UP (ref 3.8–5.2)
RBC # FLD: 15.9 % — HIGH (ref 10.3–14.5)
SARS-COV-2 IGG+IGM SERPL QL IA: 0.4 U/ML — SIGNIFICANT CHANGE UP
SARS-COV-2 IGG+IGM SERPL QL IA: NEGATIVE — SIGNIFICANT CHANGE UP
SODIUM SERPL-SCNC: 138 MMOL/L — SIGNIFICANT CHANGE UP (ref 135–145)
TSH SERPL-MCNC: 92.39 UIU/ML — HIGH (ref 0.27–4.2)
WBC # BLD: 9.55 K/UL — SIGNIFICANT CHANGE UP (ref 3.8–10.5)
WBC # FLD AUTO: 9.55 K/UL — SIGNIFICANT CHANGE UP (ref 3.8–10.5)

## 2021-05-23 PROCEDURE — 73610 X-RAY EXAM OF ANKLE: CPT | Mod: 26,RT

## 2021-05-23 PROCEDURE — 99233 SBSQ HOSP IP/OBS HIGH 50: CPT | Mod: GC

## 2021-05-23 RX ORDER — ENOXAPARIN SODIUM 100 MG/ML
40 INJECTION SUBCUTANEOUS AT BEDTIME
Refills: 0 | Status: DISCONTINUED | OUTPATIENT
Start: 2021-05-23 | End: 2021-05-24

## 2021-05-23 RX ORDER — MAGNESIUM SULFATE 500 MG/ML
2 VIAL (ML) INJECTION ONCE
Refills: 0 | Status: COMPLETED | OUTPATIENT
Start: 2021-05-23 | End: 2021-05-23

## 2021-05-23 RX ORDER — POTASSIUM CHLORIDE 20 MEQ
40 PACKET (EA) ORAL ONCE
Refills: 0 | Status: COMPLETED | OUTPATIENT
Start: 2021-05-23 | End: 2021-05-23

## 2021-05-23 RX ADMIN — Medication 650 MILLIGRAM(S): at 21:00

## 2021-05-23 RX ADMIN — Medication 150 MICROGRAM(S): at 06:08

## 2021-05-23 RX ADMIN — ENOXAPARIN SODIUM 40 MILLIGRAM(S): 100 INJECTION SUBCUTANEOUS at 21:02

## 2021-05-23 RX ADMIN — Medication 650 MILLIGRAM(S): at 06:29

## 2021-05-23 RX ADMIN — Medication 40 MILLIEQUIVALENT(S): at 09:38

## 2021-05-23 RX ADMIN — Medication 650 MILLIGRAM(S): at 22:00

## 2021-05-23 RX ADMIN — Medication 50 GRAM(S): at 08:48

## 2021-05-23 RX ADMIN — Medication 100 MILLIGRAM(S): at 11:19

## 2021-05-23 RX ADMIN — Medication 75 MILLIGRAM(S): at 06:08

## 2021-05-23 RX ADMIN — Medication 1 TABLET(S): at 11:19

## 2021-05-23 RX ADMIN — Medication 75 MILLIGRAM(S): at 00:14

## 2021-05-23 RX ADMIN — Medication 650 MILLIGRAM(S): at 07:30

## 2021-05-23 RX ADMIN — Medication 30 MILLILITER(S): at 21:00

## 2021-05-23 RX ADMIN — Medication 75 MILLIGRAM(S): at 17:46

## 2021-05-23 NOTE — PROGRESS NOTE ADULT - ATTENDING COMMENTS
30F w EtOH abuse (1 pint/d whiskey), hypothyroidism, asthma on PRN albuterol p/w tremulousness w desire to detox, also reporting R Ankle sprain    Less tremulous today. Ankle pain remains the same. Plain films negative for fx.     #EtOH w/d – 8pm 5/21; ADENIKE in   #Leukocytosis - possibly reactive in setting of recent R Ankle injury. Appears non-toxic  #Hypothyroidism - on synthroid - pt states she has not taken synthroid for approx 2wk. Reports decreased energy levels.   #prolonged QTC: 486 – will need add on Mg. Keep K/Mg > 4 and 2, respectively  #R ankle sprain - states she is able to bear wt but has been limping    --CIWA, High risk for w/d - decrease 75mg from TID to BID w PRN ativan  --Folate, Thiamine PO supplementation  --Continue synthroid at same dose - pt will need recheck in 4-6wk as outpatient  --RICE of R Ankle, PRN tylenol  --Would add VTE PPx w SQL since pt w recent trauma, decreased mobility  --SW C/S - pt interested in outpatient substance abuse programs    DISPO: TBD. Anticipate DC MON-TUE 5/24-25 .

## 2021-05-23 NOTE — PROGRESS NOTE ADULT - PROBLEM SELECTOR PLAN 1
- drinks 1 pint per day last drink 5/21/21 at 8:00PM, pt arrived to ED with a ADENIKE of 113 complaining of tremors  - has had four prior admissions for etoh w/d, never been intubated   - CIWA by my exam 2 for tremors only. Pt had gotten ativan 1mg IVP x1 and librium 50mg PO x1 by that point  - no e/o wernickes. History negative for gait problems or worsening memory. Exam negative for horizontal nystagmus  PLAN  - librium 75q8  - ativan 2mg IVP PRN  - CIWA checks  - B 12  - folate  - thiamine drinks 1 pint per day, last drink 5/21/21 at 8:00PM, pt arrived to ED with a ADENIKE of 113 complaining of tremors. No previous intubations, though 4 prev hospitalizations for EtOH withdrawal. Ciwas <8 and has not required ativan.   - decrease librium to 75 mg q12h   - ativan 2mg IVP PRN  - CIWA checks  -c/w MVI, thiamine

## 2021-05-23 NOTE — PROGRESS NOTE ADULT - PROBLEM SELECTOR PLAN 3
- takes synthroid 150mcg at home, will continue  - TSH in AM Noncompliant with synthroid 150 mcg qd at home, says she takes it around once a week because she "forgets" to take. TSH 60, free T4 low at 0.34 on arrival.  -c/w synthroid 150 mcg qd  -reinforce importance of taking medication daily   -TFTs in 6 weeks

## 2021-05-23 NOTE — PROGRESS NOTE ADULT - PROBLEM SELECTOR PLAN 2
- not currently in a program but has been to one outpatient rehab and more recently an inpatient rehab  - was receiving vivitrol shots from a clinic but stopped going  - is interested in getting help after detoxing Is interested in getting help to stop alcohol intake once medically stable from withdrawal standpoing

## 2021-05-23 NOTE — PROGRESS NOTE ADULT - SUBJECTIVE AND OBJECTIVE BOX
OVERNIGHT EVENTS: Ciwa 2, retrosternal burning pain that spontaneously resolved     SUBJECTIVE:  Patient seen and examined at bedside.     Vital Signs Last 12 Hrs  T(F): 98.6 (05-23-21 @ 06:27), Max: 98.6 (05-23-21 @ 06:27)  HR: 71 (05-23-21 @ 06:27) (71 - 71)  BP: 100/69 (05-23-21 @ 06:27) (100/69 - 100/69)  BP(mean): --  RR: 18 (05-23-21 @ 06:27) (18 - 18)  SpO2: 99% (05-23-21 @ 06:27) (99% - 99%)  I&O's Summary      PHYSICAL EXAM:  Constitutional: NAD, comfortable in bed.  HEENT: NC/AT, PERRLA, EOMI, no conjunctival pallor or scleral icterus, MMM  Neck: Supple, no JVD  Respiratory: CTA B/L. No w/r/r.   Cardiovascular: RRR, normal S1 and S2, no m/r/g.   Gastrointestinal: +BS, soft NTND, no guarding or rebound tenderness, no palpable masses   Extremities: wwp; no cyanosis, clubbing or edema.   Vascular: Pulses equal and strong throughout.   Neurological: AAOx3, no CN deficits, strength and sensation intact throughout.   Skin: No gross skin abnormalities or rashes        LABS:                        11.8   9.55  )-----------( 227      ( 23 May 2021 07:21 )             38.1     05-23    138  |  101  |  8   ----------------------------<  79  3.4<L>   |  26  |  0.81    Ca    8.2<L>      23 May 2021 07:21  Phos  2.5     05-23  Mg     1.5     05-23    TPro  6.4  /  Alb  3.4  /  TBili  0.6  /  DBili  x   /  AST  32  /  ALT  27  /  AlkPhos  65  05-23            RADIOLOGY & ADDITIONAL TESTS:    MEDICATIONS  (STANDING):  chlordiazePOXIDE 75 milliGRAM(s) Oral every 12 hours  levothyroxine 150 MICROGram(s) Oral daily  multivitamin 1 Tablet(s) Oral daily  thiamine 100 milliGRAM(s) Oral daily    MEDICATIONS  (PRN):  acetaminophen   Tablet .. 650 milliGRAM(s) Oral every 6 hours PRN Temp greater or equal to 38C (100.4F), Moderate Pain (4 - 6)  ibuprofen  Tablet. 200 milliGRAM(s) Oral every 6 hours PRN Severe Pain (7 - 10)  LORazepam   Injectable 2 milliGRAM(s) IV Push every 1 hour PRN CIWA-Ar score 8 or greater   OVERNIGHT EVENTS: Ciwa 2, retrosternal burning pain that spontaneously resolved     SUBJECTIVE:  Patient seen and examined at bedside. Pt is tearful because she is upset she started drinking again. Mild R ankle pain.  Denies abdominal pain, worsening tremors, sweating, fever/chills, confusion. 12 pt ROS otherwise negative     Vital Signs Last 12 Hrs  T(F): 98.6 (05-23-21 @ 06:27), Max: 98.6 (05-23-21 @ 06:27)  HR: 71 (05-23-21 @ 06:27) (71 - 71)  BP: 100/69 (05-23-21 @ 06:27) (100/69 - 100/69)  BP(mean): --  RR: 18 (05-23-21 @ 06:27) (18 - 18)  SpO2: 99% (05-23-21 @ 06:27) (99% - 99%)  I&O's Summary      PHYSICAL EXAM:  Constitutional: NAD, comfortable in bed.  HEENT: NC/AT, PERRLA, EOMI, no conjunctival pallor or scleral icterus, MMM  Neck: Supple, no JVD  Respiratory: CTA B/L. No w/r/r.   Cardiovascular: RRR, normal S1 and S2, no m/r/g.   Gastrointestinal: +BS, soft NTND, no guarding or rebound tenderness, no palpable masses   Extremities: wwp; no cyanosis, clubbing or edema. No tremor   Vascular: Pulses equal and strong throughout.   Neurological: AAOx3, no CN deficits, strength and sensation intact throughout.   Skin: No gross skin abnormalities or rashes        LABS:                        11.8   9.55  )-----------( 227      ( 23 May 2021 07:21 )             38.1     05-23    138  |  101  |  8   ----------------------------<  79  3.4<L>   |  26  |  0.81    Ca    8.2<L>      23 May 2021 07:21  Phos  2.5     05-23  Mg     1.5     05-23    TPro  6.4  /  Alb  3.4  /  TBili  0.6  /  DBili  x   /  AST  32  /  ALT  27  /  AlkPhos  65  05-23            RADIOLOGY & ADDITIONAL TESTS:    MEDICATIONS  (STANDING):  chlordiazePOXIDE 75 milliGRAM(s) Oral every 12 hours  levothyroxine 150 MICROGram(s) Oral daily  multivitamin 1 Tablet(s) Oral daily  thiamine 100 milliGRAM(s) Oral daily    MEDICATIONS  (PRN):  acetaminophen   Tablet .. 650 milliGRAM(s) Oral every 6 hours PRN Temp greater or equal to 38C (100.4F), Moderate Pain (4 - 6)  ibuprofen  Tablet. 200 milliGRAM(s) Oral every 6 hours PRN Severe Pain (7 - 10)  LORazepam   Injectable 2 milliGRAM(s) IV Push every 1 hour PRN CIWA-Ar score 8 or greater

## 2021-05-23 NOTE — PROGRESS NOTE ADULT - PROBLEM SELECTOR PLAN 5
.  F: none  E: f/u Mag  N: regular  DVT: none  GI: none  bowel: none  glucose: none  pain: tylenol  dispo: [facility]  code: F: none  E: replete PRN   N: regular  DVT: none  GI: none  dispo: [facility]  code: full

## 2021-05-23 NOTE — PROGRESS NOTE ADULT - ASSESSMENT
30yoF with a PMH of etoh abuse, hypothyroidism, mild intermittent asthma presents with tremors which started when she stopped drinking roughly 24hrs ago 30yoF with a PMH of etoh abuse, hypothyroidism, mild intermittent asthma presents with tremors after stopping drinking alcohol 24 hrs prior to admission, admitted for further management.

## 2021-05-24 ENCOUNTER — TRANSCRIPTION ENCOUNTER (OUTPATIENT)
Age: 31
End: 2021-05-24

## 2021-05-24 VITALS
TEMPERATURE: 98 F | HEART RATE: 78 BPM | OXYGEN SATURATION: 100 % | DIASTOLIC BLOOD PRESSURE: 70 MMHG | SYSTOLIC BLOOD PRESSURE: 108 MMHG | RESPIRATION RATE: 18 BRPM

## 2021-05-24 LAB
ANION GAP SERPL CALC-SCNC: 10 MMOL/L — SIGNIFICANT CHANGE UP (ref 5–17)
BUN SERPL-MCNC: 6 MG/DL — LOW (ref 7–23)
CALCIUM SERPL-MCNC: 9.1 MG/DL — SIGNIFICANT CHANGE UP (ref 8.4–10.5)
CHLORIDE SERPL-SCNC: 100 MMOL/L — SIGNIFICANT CHANGE UP (ref 96–108)
CO2 SERPL-SCNC: 28 MMOL/L — SIGNIFICANT CHANGE UP (ref 22–31)
CREAT SERPL-MCNC: 0.82 MG/DL — SIGNIFICANT CHANGE UP (ref 0.5–1.3)
GLUCOSE SERPL-MCNC: 100 MG/DL — HIGH (ref 70–99)
HCT VFR BLD CALC: 40.6 % — SIGNIFICANT CHANGE UP (ref 34.5–45)
HGB BLD-MCNC: 12.3 G/DL — SIGNIFICANT CHANGE UP (ref 11.5–15.5)
MAGNESIUM SERPL-MCNC: 2 MG/DL — SIGNIFICANT CHANGE UP (ref 1.6–2.6)
MCHC RBC-ENTMCNC: 30.2 PG — SIGNIFICANT CHANGE UP (ref 27–34)
MCHC RBC-ENTMCNC: 30.3 GM/DL — LOW (ref 32–36)
MCV RBC AUTO: 99.8 FL — SIGNIFICANT CHANGE UP (ref 80–100)
NRBC # BLD: 0 /100 WBCS — SIGNIFICANT CHANGE UP (ref 0–0)
PHOSPHATE SERPL-MCNC: 2.9 MG/DL — SIGNIFICANT CHANGE UP (ref 2.5–4.5)
PLATELET # BLD AUTO: 222 K/UL — SIGNIFICANT CHANGE UP (ref 150–400)
POTASSIUM SERPL-MCNC: 3.3 MMOL/L — LOW (ref 3.5–5.3)
POTASSIUM SERPL-SCNC: 3.3 MMOL/L — LOW (ref 3.5–5.3)
RBC # BLD: 4.07 M/UL — SIGNIFICANT CHANGE UP (ref 3.8–5.2)
RBC # FLD: 16.1 % — HIGH (ref 10.3–14.5)
SODIUM SERPL-SCNC: 138 MMOL/L — SIGNIFICANT CHANGE UP (ref 135–145)
WBC # BLD: 6.86 K/UL — SIGNIFICANT CHANGE UP (ref 3.8–10.5)
WBC # FLD AUTO: 6.86 K/UL — SIGNIFICANT CHANGE UP (ref 3.8–10.5)

## 2021-05-24 PROCEDURE — 36415 COLL VENOUS BLD VENIPUNCTURE: CPT

## 2021-05-24 PROCEDURE — 99285 EMERGENCY DEPT VISIT HI MDM: CPT | Mod: 25

## 2021-05-24 PROCEDURE — 80053 COMPREHEN METABOLIC PANEL: CPT

## 2021-05-24 PROCEDURE — 84100 ASSAY OF PHOSPHORUS: CPT

## 2021-05-24 PROCEDURE — 83735 ASSAY OF MAGNESIUM: CPT

## 2021-05-24 PROCEDURE — 85025 COMPLETE CBC W/AUTO DIFF WBC: CPT

## 2021-05-24 PROCEDURE — 84439 ASSAY OF FREE THYROXINE: CPT

## 2021-05-24 PROCEDURE — 82150 ASSAY OF AMYLASE: CPT

## 2021-05-24 PROCEDURE — 80048 BASIC METABOLIC PNL TOTAL CA: CPT

## 2021-05-24 PROCEDURE — 99239 HOSP IP/OBS DSCHRG MGMT >30: CPT | Mod: GC

## 2021-05-24 PROCEDURE — 96374 THER/PROPH/DIAG INJ IV PUSH: CPT

## 2021-05-24 PROCEDURE — U0003: CPT

## 2021-05-24 PROCEDURE — 85027 COMPLETE CBC AUTOMATED: CPT

## 2021-05-24 PROCEDURE — 86769 SARS-COV-2 COVID-19 ANTIBODY: CPT

## 2021-05-24 PROCEDURE — U0005: CPT

## 2021-05-24 PROCEDURE — 93005 ELECTROCARDIOGRAM TRACING: CPT

## 2021-05-24 PROCEDURE — 82962 GLUCOSE BLOOD TEST: CPT

## 2021-05-24 PROCEDURE — 84443 ASSAY THYROID STIM HORMONE: CPT

## 2021-05-24 PROCEDURE — 80307 DRUG TEST PRSMV CHEM ANLYZR: CPT

## 2021-05-24 PROCEDURE — 96375 TX/PRO/DX INJ NEW DRUG ADDON: CPT

## 2021-05-24 PROCEDURE — 73610 X-RAY EXAM OF ANKLE: CPT

## 2021-05-24 PROCEDURE — 83690 ASSAY OF LIPASE: CPT

## 2021-05-24 RX ORDER — ONDANSETRON 8 MG/1
4 TABLET, FILM COATED ORAL ONCE
Refills: 0 | Status: COMPLETED | OUTPATIENT
Start: 2021-05-24 | End: 2021-05-24

## 2021-05-24 RX ORDER — PANTOPRAZOLE SODIUM 20 MG/1
20 TABLET, DELAYED RELEASE ORAL ONCE
Refills: 0 | Status: COMPLETED | OUTPATIENT
Start: 2021-05-24 | End: 2021-05-24

## 2021-05-24 RX ORDER — POTASSIUM CHLORIDE 20 MEQ
40 PACKET (EA) ORAL ONCE
Refills: 0 | Status: COMPLETED | OUTPATIENT
Start: 2021-05-24 | End: 2021-05-24

## 2021-05-24 RX ADMIN — Medication 75 MILLIGRAM(S): at 06:10

## 2021-05-24 RX ADMIN — Medication 150 MICROGRAM(S): at 06:10

## 2021-05-24 RX ADMIN — ONDANSETRON 4 MILLIGRAM(S): 8 TABLET, FILM COATED ORAL at 00:27

## 2021-05-24 RX ADMIN — Medication 100 MILLIGRAM(S): at 11:06

## 2021-05-24 RX ADMIN — Medication 40 MILLIEQUIVALENT(S): at 09:00

## 2021-05-24 RX ADMIN — PANTOPRAZOLE SODIUM 20 MILLIGRAM(S): 20 TABLET, DELAYED RELEASE ORAL at 00:27

## 2021-05-24 RX ADMIN — Medication 1 TABLET(S): at 11:06

## 2021-05-24 RX ADMIN — Medication 25 MILLIGRAM(S): at 16:10

## 2021-05-24 NOTE — DISCHARGE NOTE PROVIDER - HOSPITAL COURSE
#Discharge: do not delete    30yoF with a PMH of etoh abuse, hypothyroidism, mild intermittent asthma presents with tremors after stopping drinking alcohol 24 hrs prior to admission, admitted for further management.     Problem List/Main Diagnoses (system-based):   1. Alcohol withdrawal - drinks 1 pint a day, last drink 5/21 at 8pm, had tremors on initial exam  -pt was put on librium and gradually tapered librium through 5/24, Ciwas <8 with no additional IVP ativan needed     2. Hypothyroid - TSH found to be very high at 95. Pt noncompliant with synthroid. Reinforced importance of medication.  -c/w synthroid 150 mcg     Labs to be followed outpatient: TFTs in 6 weeks   Exam to be followed outpatient: none

## 2021-05-24 NOTE — DISCHARGE NOTE NURSING/CASE MANAGEMENT/SOCIAL WORK - NSDCFUADDAPPT_GEN_ALL_CORE_FT
Please bring your Insurance card, Photo ID and Discharge paperwork to the following appointment:    Please follow up with your Primary Care Provider, Dr. Robin Delgadillo on behalf of Dr. Moo Saxena at [address] [place] [address], [address] [place] [address] on 06/01/2021 at 1:00pm.    Appointment was scheduled by Ms. KATELIN Boswell, Referral Coordinator.

## 2021-05-24 NOTE — DISCHARGE NOTE PROVIDER - PROVIDER TOKENS
PROVIDER:[TOKEN:[[id number]],FOLLOWUP:[2 weeks]] PROVIDER:[TOKEN:[[id number]],SCHEDULEDAPPT:[06/01/2021],SCHEDULEDAPPTTIME:[01:00 PM]]

## 2021-05-24 NOTE — DISCHARGE NOTE PROVIDER - NSDCMRMEDTOKEN_GEN_ALL_CORE_FT
albuterol 0.63 mg/3 mL (0.021%) inhalation solution: 3 milliliter(s) inhaled 3 times a day  Synthroid 150 mcg (0.15 mg) oral tablet: 1 tab(s) orally once a day

## 2021-05-24 NOTE — DISCHARGE NOTE PROVIDER - NSDCCPCAREPLAN_GEN_ALL_CORE_FT
PRINCIPAL DISCHARGE DIAGNOSIS  Diagnosis: Alcohol withdrawal syndrome without complication  Assessment and Plan of Treatment: You were admitted to the hospital because of alcohol withdrawal symptoms including tremors, nausea, and headache. We started you on a medication called librium to help control your withdrawal symptoms before they got really dangerous. Your symptoms were well controlled on this medication, and we gradually lowered your dose of it and your symptoms were well controlled.

## 2021-05-24 NOTE — DISCHARGE NOTE PROVIDER - NSDCFUADDAPPT_GEN_ALL_CORE_FT
Please bring your Insurance card, Photo ID and Discharge paperwork to the following appointment:    Please follow up with your Primary Care Provider, Dr. Robin Delgadillo on behalf of Dr. Moo Saxena at [address] [place] [address], [address] [place] [address] on 06/01/2021 at 1:00pm.    Appointment was scheduled by Ms. KATELIN Boswell, Referral Coordinator.

## 2021-05-24 NOTE — DISCHARGE NOTE NURSING/CASE MANAGEMENT/SOCIAL WORK - PATIENT PORTAL LINK FT
You can access the FollowMyHealth Patient Portal offered by [facility] by registering at the following website: [email or web address]. By joining [employer]’s FollowMyHealth portal, you will also be able to view your health information using other applications (apps) compatible with our system.

## 2021-05-24 NOTE — DISCHARGE NOTE PROVIDER - CARE PROVIDER_API CALL
Moo Saxena)  Internal Medicine  [address] [place] [address]  Phone: [phone]  Fax: [phone]  Follow Up Time: 2 weeks   Moo Saxena)  Internal Medicine  [address] [place] [address]  Phone: [phone]  Fax: [phone]  Scheduled Appointment: 06/01/2021 01:00 PM

## 2021-05-25 ENCOUNTER — NON-APPOINTMENT (OUTPATIENT)
Age: 31
End: 2021-05-25

## 2021-06-01 ENCOUNTER — APPOINTMENT (OUTPATIENT)
Dept: INTERNAL MEDICINE | Facility: CLINIC | Age: 31
End: 2021-06-01
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
## 2021-06-01 DIAGNOSIS — Z79.51 LONG TERM (CURRENT) USE OF INHALED STEROIDS: ICD-10-CM

## 2021-06-01 DIAGNOSIS — R94.31 ABNORMAL ELECTROCARDIOGRAM [ECG] [EKG]: ICD-10-CM

## 2021-06-01 DIAGNOSIS — E03.9 HYPOTHYROIDISM, UNSPECIFIED: ICD-10-CM

## 2021-06-01 DIAGNOSIS — D72.829 ELEVATED WHITE BLOOD CELL COUNT, UNSPECIFIED: ICD-10-CM
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
## 2021-06-01 DIAGNOSIS — F10.139 ALCOHOL ABUSE WITH WITHDRAWAL, UNSPECIFIED: ICD-10-CM

## 2021-06-01 DIAGNOSIS — R25.1 TREMOR, UNSPECIFIED: ICD-10-CM
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
## 2021-06-01 DIAGNOSIS — S93.401A SPRAIN OF UNSPECIFIED LIGAMENT OF RIGHT ANKLE, INITIAL ENCOUNTER: ICD-10-CM

## 2021-06-01 DIAGNOSIS — J45.909 UNSPECIFIED ASTHMA, UNCOMPLICATED: ICD-10-CM

## 2021-06-01 DIAGNOSIS — Z91.013 ALLERGY TO SEAFOOD: ICD-10-CM
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
## 2021-06-02 ENCOUNTER — APPOINTMENT (OUTPATIENT)
Dept: INTERNAL MEDICINE | Facility: CLINIC | Age: 31
End: 2021-06-02

## 2021-06-04 ENCOUNTER — EMERGENCY (EMERGENCY)
Facility: HOSPITAL | Age: 31
LOS: 1 days | Discharge: HOME | End: 2021-06-04
Attending: EMERGENCY MEDICINE | Admitting: EMERGENCY MEDICINE
Payer: COMMERCIAL

## 2021-06-04 VITALS
HEART RATE: 89 BPM | SYSTOLIC BLOOD PRESSURE: 128 MMHG | OXYGEN SATURATION: 100 % | DIASTOLIC BLOOD PRESSURE: 78 MMHG | RESPIRATION RATE: 18 BRPM

## 2021-06-04 VITALS
WEIGHT: 145.06 LBS | HEART RATE: 114 BPM | TEMPERATURE: 98 F | OXYGEN SATURATION: 99 % | DIASTOLIC BLOOD PRESSURE: 73 MMHG | RESPIRATION RATE: 17 BRPM | SYSTOLIC BLOOD PRESSURE: 112 MMHG | HEIGHT: 62 IN

## 2021-06-04 DIAGNOSIS — F10.129 ALCOHOL ABUSE WITH INTOXICATION, UNSPECIFIED: ICD-10-CM

## 2021-06-04 DIAGNOSIS — J45.909 UNSPECIFIED ASTHMA, UNCOMPLICATED: ICD-10-CM

## 2021-06-04 DIAGNOSIS — R41.82 ALTERED MENTAL STATUS, UNSPECIFIED: ICD-10-CM

## 2021-06-04 DIAGNOSIS — E03.9 HYPOTHYROIDISM, UNSPECIFIED: ICD-10-CM

## 2021-06-04 DIAGNOSIS — Z91.013 ALLERGY TO SEAFOOD: ICD-10-CM
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
## 2021-06-04 LAB
ANION GAP SERPL CALC-SCNC: 15 MMOL/L — SIGNIFICANT CHANGE UP (ref 5–17)
BASOPHILS # BLD AUTO: 0.11 K/UL — SIGNIFICANT CHANGE UP (ref 0–0.2)
BASOPHILS NFR BLD AUTO: 1 % — SIGNIFICANT CHANGE UP (ref 0–2)
BUN SERPL-MCNC: 8 MG/DL — SIGNIFICANT CHANGE UP (ref 7–23)
CALCIUM SERPL-MCNC: 8.2 MG/DL — LOW (ref 8.4–10.5)
CHLORIDE SERPL-SCNC: 107 MMOL/L — SIGNIFICANT CHANGE UP (ref 96–108)
CO2 SERPL-SCNC: 25 MMOL/L — SIGNIFICANT CHANGE UP (ref 22–31)
CREAT SERPL-MCNC: 0.77 MG/DL — SIGNIFICANT CHANGE UP (ref 0.5–1.3)
EOSINOPHIL # BLD AUTO: 0.15 K/UL — SIGNIFICANT CHANGE UP (ref 0–0.5)
EOSINOPHIL NFR BLD AUTO: 1.4 % — SIGNIFICANT CHANGE UP (ref 0–6)
ETHANOL SERPL-MCNC: 377 MG/DL — HIGH (ref 0–10)
GLUCOSE SERPL-MCNC: 92 MG/DL — SIGNIFICANT CHANGE UP (ref 70–99)
HCT VFR BLD CALC: 40.5 % — SIGNIFICANT CHANGE UP (ref 34.5–45)
HGB BLD-MCNC: 12.3 G/DL — SIGNIFICANT CHANGE UP (ref 11.5–15.5)
IMM GRANULOCYTES NFR BLD AUTO: 0.6 % — SIGNIFICANT CHANGE UP (ref 0–1.5)
LYMPHOCYTES # BLD AUTO: 1.91 K/UL — SIGNIFICANT CHANGE UP (ref 1–3.3)
LYMPHOCYTES # BLD AUTO: 17.6 % — SIGNIFICANT CHANGE UP (ref 13–44)
MCHC RBC-ENTMCNC: 30.2 PG — SIGNIFICANT CHANGE UP (ref 27–34)
MCHC RBC-ENTMCNC: 30.4 GM/DL — LOW (ref 32–36)
MCV RBC AUTO: 99.5 FL — SIGNIFICANT CHANGE UP (ref 80–100)
MONOCYTES # BLD AUTO: 0.61 K/UL — SIGNIFICANT CHANGE UP (ref 0–0.9)
MONOCYTES NFR BLD AUTO: 5.6 % — SIGNIFICANT CHANGE UP (ref 2–14)
NEUTROPHILS # BLD AUTO: 8.01 K/UL — HIGH (ref 1.8–7.4)
NEUTROPHILS NFR BLD AUTO: 73.8 % — SIGNIFICANT CHANGE UP (ref 43–77)
NRBC # BLD: 0 /100 WBCS — SIGNIFICANT CHANGE UP (ref 0–0)
PLATELET # BLD AUTO: 268 K/UL — SIGNIFICANT CHANGE UP (ref 150–400)
POTASSIUM SERPL-MCNC: 3.4 MMOL/L — LOW (ref 3.5–5.3)
POTASSIUM SERPL-SCNC: 3.4 MMOL/L — LOW (ref 3.5–5.3)
RBC # BLD: 4.07 M/UL — SIGNIFICANT CHANGE UP (ref 3.8–5.2)
RBC # FLD: 18 % — HIGH (ref 10.3–14.5)
SODIUM SERPL-SCNC: 147 MMOL/L — HIGH (ref 135–145)
WBC # BLD: 10.86 K/UL — HIGH (ref 3.8–10.5)
WBC # FLD AUTO: 10.86 K/UL — HIGH (ref 3.8–10.5)

## 2021-06-04 PROCEDURE — 99284 EMERGENCY DEPT VISIT MOD MDM: CPT

## 2021-06-04 PROCEDURE — 82962 GLUCOSE BLOOD TEST: CPT

## 2021-06-04 PROCEDURE — 85025 COMPLETE CBC W/AUTO DIFF WBC: CPT

## 2021-06-04 PROCEDURE — 80048 BASIC METABOLIC PNL TOTAL CA: CPT

## 2021-06-04 PROCEDURE — 99285 EMERGENCY DEPT VISIT HI MDM: CPT

## 2021-06-04 PROCEDURE — 36415 COLL VENOUS BLD VENIPUNCTURE: CPT

## 2021-06-04 PROCEDURE — 80307 DRUG TEST PRSMV CHEM ANLYZR: CPT

## 2021-06-04 RX ORDER — SODIUM CHLORIDE 9 MG/ML
1000 INJECTION INTRAMUSCULAR; INTRAVENOUS; SUBCUTANEOUS ONCE
Refills: 0 | Status: COMPLETED | OUTPATIENT
Start: 2021-06-04 | End: 2021-06-04

## 2021-06-04 RX ADMIN — SODIUM CHLORIDE 1000 MILLILITER(S): 9 INJECTION INTRAMUSCULAR; INTRAVENOUS; SUBCUTANEOUS at 15:20

## 2021-06-04 NOTE — ED ADULT NURSE REASSESSMENT NOTE - NS ED NURSE REASSESS COMMENT FT1
Pt ambulatory w steady gait, AA&Ox4, no acute distress. Educated on detox units at other hospitals. Verbalized understanding.
pt ambulatory w steady gait to bathroom accompanied by RN.
Ambulated to bathroom independently with steady gait. A+O x 4.

## 2021-06-04 NOTE — ED PROVIDER NOTE - OBJECTIVE STATEMENT
30 year old female with history of asthma, hypothyroidism, ETOH abuse since the age of 15 (drinks apx 1 pint/day) presents to ED with concern for acute alcohol intoxication.  Patient with recent [facility] ED visit and subsequent hospitalization 2/2 concern for ETOH w/d apx 2 weeks ago.  Patient states she drank 1L of hard liquor and "a couple beers" today and presents to ED acutely intoxicated, stating she wants to stop drinking but continues to relapse.  She notes feeling dehydrated, however denies any additional acute complaints or concerns at this time.  She is awake, alert and responding appropriately to ROS questions on arrival to ED.

## 2021-06-04 NOTE — ED PROVIDER NOTE - CARE PLAN
Pre op Ostomy marking:    This patient was seen today per request  in preparation for upcoming ostomy surgery on 10/15  Stoma marking/siting was performed per protocol and patient marked with a permanent marker and skin staining with silver nitrate.         The proposed surgery was discussed and patient educated on expected postoperative course and expectations. The patient was allowed to ask questions , his wife is along with him,   He had ileo last year, so is somewhat familiar with ostomy,    
Principal Discharge DX:	Alcohol intoxication  Secondary Diagnosis:	Alcohol abuse

## 2021-06-04 NOTE — ED PROVIDER NOTE - NSFOLLOWUPINSTRUCTIONS_ED_ALL_ED_FT
Please follow up with your primary physician in 1-2 days for re evaluation.  Please return to ER immediately should your symptoms worsen or if you have any concern prior to this recommended follow up.          Alcohol Use Disorder    WHAT YOU NEED TO KNOW:    Alcohol use disorder (AUD) is problem drinking. AUD includes alcohol abuse and alcohol dependence. Alcohol can damage your brain, heart, kidneys, lungs, and liver. Your risk of stroke is greater if you have 5 or more drinks each day. If you are pregnant, you and your baby are at risk for serious health problems. No amount of alcohol is safe during pregnancy.    DISCHARGE INSTRUCTIONS:    Call your local emergency number (911 in the ) if:   •You have seizures.          Call your doctor if:   •Your heart is beating faster than usual.      •You have hallucinations.      •You cannot remember what happens while you are drinking.      •You are anxious and have nausea.      •Your hands are shaky and you are sweating heavily.      •You have questions or concerns about your condition or care.      Follow up with your healthcare provider as directed: Do not try to stop drinking on your own. Your healthcare provider may need to help you withdraw from alcohol safely. He or she may need to admit you to the hospital. You may also need any of the following:  •Medicines to decrease your craving for alcohol      •Support groups such as Alcoholics Anonymous       •Therapy from a psychiatrist or psychologist       •Admission to an inpatient facility for treatment for severe AUD      For support and more information:   •Substance Abuse and Mental Health Services Administration  [address]  [place] [address]  Web Address: http://www.sama.gov      •Alcoholics Anonymous  Web Address: http://www.aa.org           © Copyright [employer] 2021           back to top                          © Copyright [employer] 2021

## 2021-06-04 NOTE — ED PROVIDER NOTE - PATIENT PORTAL LINK FT
You can access the FollowMyHealth Patient Portal offered by [facility] by registering at the following website: [email or web address]. By joining [employer]’s FollowMyHealth portal, you will also be able to view your health information using other applications (apps) compatible with our system.

## 2021-06-04 NOTE — ED PROVIDER NOTE - CLINICAL SUMMARY MEDICAL DECISION MAKING FREE TEXT BOX
Patient presents to ED with concern for acute ETOH intoxication stating she drank and liter and several beers prior to arrival - noting last drink 30 minutes PTA in ED.  Patient awake, alert and tearful on arrival to ED.  She notes frustration in herself that she continues to relapse.   in ED to meet with patient and offer resources.  Labs reviewed, ETOH significantly elevated.  Will hydrate, monitor and re evaluate when clinically sober.  Following shift completion, patient's care is handed over to oncoming night team for final dispo pending period of clinical sobriety to assess for possible safe discharge.  Patient is stable at time of transfer of care. Patient presents to ED with concern for acute ETOH intoxication stating she drank and liter and several beers prior to arrival - noting last drink 30 minutes PTA in ED.  Patient awake, alert and tearful on arrival to ED.  She notes frustration in herself that she continues to relapse.   in ED to meet with patient and offer resources.  Labs reviewed, ETOH significantly elevated.  Will hydrate, monitor and re evaluate when clinically sober.      Update @ 2035 - Patient up and ambulatory with steady gait in ED.  She has resources from social work for detox, also already enrolled in program and encouraged to continue.  Vital signs stable.  Will discharge following discussion encouraging her to seek out inpatient detox facilities per documents provided.  Patient is advised to return to ED immediately should she have any concern prior to the recommended outpatient care/follow up.  Patient is aware of plan and verbalizes her understanding.  Will discharge home at this time.

## 2021-06-04 NOTE — ED PROVIDER NOTE - DISPOSITION TYPE
My findings and recommendations TODAY are based on the patient's symptoms, exam, diagnostic testing and records. DISCHARGE

## 2021-06-04 NOTE — ED ADULT NURSE NOTE - OBJECTIVE STATEMENT
Pt presents to ED c/o altered mental status. Pt states "I am here for alcohol withdrawal". Last drink 30 min prior to arrival. denies hx of seizures r/t withdrawal. A&Ox4, ambulatory w steady gait into department, no acute distress. Denies cp, fevers, chills, nausea, vomiting. No tremors noted.

## 2021-06-04 NOTE — ED PROVIDER NOTE - PHYSICAL EXAMINATION
VITAL SIGNS: I have reviewed nursing notes and confirm.  CONSTITUTIONAL: Well-developed; well-nourished; in no acute distress.   SKIN:  Warm and dry, no acute rash.   HEAD:  normocephalic, atraumatic.  EYES: PERRL.  EOM intact; conjunctiva and sclera clear.  ENT: No nasal discharge; airway clear.   NECK: Supple; non tender.  CARD: S1, S2 normal; no murmurs, gallops, or rubs. Regular rate and rhythm.   RESP:  Clear to auscultation b/l, no wheezes, rales or rhonchi.  ABD: Normal bowel sounds; soft; non-distended; non-tender; no guarding/rebound.  EXT: Normal ROM. No clubbing, cyanosis or edema. No significant reproducible discomfort on palpation throughout right ankle/foot/proximal tib/fib.  2+ pulses to b/l ue/le.  NEURO: Alert, oriented, grossly unremarkable.  5/5 strength x 4 extremities against gravity and external force.  No drift x 4 extremities.  Sensation intact and symmetric x 4 extremities.  No facial asymmetry.    PSYCH: Cooperative, mood and affect appropriate.

## 2021-06-04 NOTE — ED PROVIDER NOTE - NS ED ROS FT
Constitutional: No fever or chills.   Eyes: No pain, blurry vision, or discharge.  ENMT: No hearing changes, pain, discharge or infections. No neck pain or stiffness.  Cardiac: No chest pain, SOB or edema. No chest pain with exertion.  Respiratory: No cough or respiratory distress. No hemoptysis. No history of asthma or RAD.  GI: No nausea, vomiting, diarrhea or abdominal pain.  : No dysuria, frequency or burning.  MS: No myalgia, muscle weakness or back pain.  + Ongoing right ankle pain.  Neuro: No headache or weakness. No LOC.  Skin: No skin rash.   Endocrine: No history of thyroid disease or diabetes.  Except as documented in the HPI, all other systems are negative.

## 2021-06-04 NOTE — ED ADULT TRIAGE NOTE - CHIEF COMPLAINT QUOTE
Patient states she has been heavily drinking x 1.5 weeks.  Last drink this morning.  Also c/o right ankle pain after slip and fall last week

## 2021-06-09 ENCOUNTER — NON-APPOINTMENT (OUTPATIENT)
Age: 31
End: 2021-06-09

## 2021-06-10 ENCOUNTER — NON-APPOINTMENT (OUTPATIENT)
Age: 31
End: 2021-06-10

## 2021-06-11 ENCOUNTER — NON-APPOINTMENT (OUTPATIENT)
Age: 31
End: 2021-06-11

## 2021-06-17 ENCOUNTER — APPOINTMENT (OUTPATIENT)
Dept: INTERNAL MEDICINE | Facility: CLINIC | Age: 31
End: 2021-06-17

## 2021-06-24 ENCOUNTER — NON-APPOINTMENT (OUTPATIENT)
Age: 31
End: 2021-06-24

## 2021-07-01 ENCOUNTER — NON-APPOINTMENT (OUTPATIENT)
Age: 31
End: 2021-07-01

## 2021-07-17 VITALS
DIASTOLIC BLOOD PRESSURE: 94 MMHG | RESPIRATION RATE: 16 BRPM | HEIGHT: 62 IN | HEART RATE: 113 BPM | WEIGHT: 130.07 LBS | TEMPERATURE: 99 F | OXYGEN SATURATION: 99 % | SYSTOLIC BLOOD PRESSURE: 134 MMHG

## 2021-07-17 LAB
ALBUMIN SERPL ELPH-MCNC: 4.6 G/DL — SIGNIFICANT CHANGE UP (ref 3.3–5)
ALP SERPL-CCNC: 89 U/L — SIGNIFICANT CHANGE UP (ref 40–120)
ALT FLD-CCNC: 44 U/L — SIGNIFICANT CHANGE UP (ref 10–45)
ANION GAP SERPL CALC-SCNC: 15 MMOL/L — SIGNIFICANT CHANGE UP (ref 5–17)
AST SERPL-CCNC: 93 U/L — HIGH (ref 10–40)
BASOPHILS # BLD AUTO: 0.04 K/UL — SIGNIFICANT CHANGE UP (ref 0–0.2)
BASOPHILS NFR BLD AUTO: 0.7 % — SIGNIFICANT CHANGE UP (ref 0–2)
BILIRUB SERPL-MCNC: 0.5 MG/DL — SIGNIFICANT CHANGE UP (ref 0.2–1.2)
BUN SERPL-MCNC: 8 MG/DL — SIGNIFICANT CHANGE UP (ref 7–23)
CALCIUM SERPL-MCNC: 9.3 MG/DL — SIGNIFICANT CHANGE UP (ref 8.4–10.5)
CHLORIDE SERPL-SCNC: 96 MMOL/L — SIGNIFICANT CHANGE UP (ref 96–108)
CO2 SERPL-SCNC: 31 MMOL/L — SIGNIFICANT CHANGE UP (ref 22–31)
CREAT SERPL-MCNC: 0.79 MG/DL — SIGNIFICANT CHANGE UP (ref 0.5–1.3)
EOSINOPHIL # BLD AUTO: 0.06 K/UL — SIGNIFICANT CHANGE UP (ref 0–0.5)
EOSINOPHIL NFR BLD AUTO: 1.1 % — SIGNIFICANT CHANGE UP (ref 0–6)
ETHANOL SERPL-MCNC: 155 MG/DL — HIGH (ref 0–10)
GLUCOSE SERPL-MCNC: 87 MG/DL — SIGNIFICANT CHANGE UP (ref 70–99)
HCG SERPL-ACNC: <0 MIU/ML — SIGNIFICANT CHANGE UP
HCT VFR BLD CALC: 42.2 % — SIGNIFICANT CHANGE UP (ref 34.5–45)
HGB BLD-MCNC: 13.2 G/DL — SIGNIFICANT CHANGE UP (ref 11.5–15.5)
IMM GRANULOCYTES NFR BLD AUTO: 0.4 % — SIGNIFICANT CHANGE UP (ref 0–1.5)
LIDOCAIN IGE QN: 99 U/L — HIGH (ref 7–60)
LYMPHOCYTES # BLD AUTO: 1.65 K/UL — SIGNIFICANT CHANGE UP (ref 1–3.3)
LYMPHOCYTES # BLD AUTO: 29.6 % — SIGNIFICANT CHANGE UP (ref 13–44)
MCHC RBC-ENTMCNC: 31.3 GM/DL — LOW (ref 32–36)
MCHC RBC-ENTMCNC: 31.7 PG — SIGNIFICANT CHANGE UP (ref 27–34)
MCV RBC AUTO: 101.4 FL — HIGH (ref 80–100)
MONOCYTES # BLD AUTO: 0.53 K/UL — SIGNIFICANT CHANGE UP (ref 0–0.9)
MONOCYTES NFR BLD AUTO: 9.5 % — SIGNIFICANT CHANGE UP (ref 2–14)
NEUTROPHILS # BLD AUTO: 3.28 K/UL — SIGNIFICANT CHANGE UP (ref 1.8–7.4)
NEUTROPHILS NFR BLD AUTO: 58.7 % — SIGNIFICANT CHANGE UP (ref 43–77)
NRBC # BLD: 0 /100 WBCS — SIGNIFICANT CHANGE UP (ref 0–0)
PLATELET # BLD AUTO: 154 K/UL — SIGNIFICANT CHANGE UP (ref 150–400)
POTASSIUM SERPL-MCNC: 3 MMOL/L — LOW (ref 3.5–5.3)
POTASSIUM SERPL-SCNC: 3 MMOL/L — LOW (ref 3.5–5.3)
PROT SERPL-MCNC: 8 G/DL — SIGNIFICANT CHANGE UP (ref 6–8.3)
RBC # BLD: 4.16 M/UL — SIGNIFICANT CHANGE UP (ref 3.8–5.2)
RBC # FLD: 17.5 % — HIGH (ref 10.3–14.5)
SODIUM SERPL-SCNC: 142 MMOL/L — SIGNIFICANT CHANGE UP (ref 135–145)
WBC # BLD: 5.58 K/UL — SIGNIFICANT CHANGE UP (ref 3.8–10.5)
WBC # FLD AUTO: 5.58 K/UL — SIGNIFICANT CHANGE UP (ref 3.8–10.5)

## 2021-07-17 PROCEDURE — 93010 ELECTROCARDIOGRAM REPORT: CPT

## 2021-07-17 PROCEDURE — 99285 EMERGENCY DEPT VISIT HI MDM: CPT

## 2021-07-17 RX ORDER — ONDANSETRON 8 MG/1
4 TABLET, FILM COATED ORAL ONCE
Refills: 0 | Status: COMPLETED | OUTPATIENT
Start: 2021-07-17 | End: 2021-07-17

## 2021-07-17 RX ORDER — PANTOPRAZOLE SODIUM 20 MG/1
40 TABLET, DELAYED RELEASE ORAL ONCE
Refills: 0 | Status: COMPLETED | OUTPATIENT
Start: 2021-07-17 | End: 2021-07-17

## 2021-07-17 RX ORDER — SODIUM CHLORIDE 9 MG/ML
1000 INJECTION INTRAMUSCULAR; INTRAVENOUS; SUBCUTANEOUS ONCE
Refills: 0 | Status: COMPLETED | OUTPATIENT
Start: 2021-07-17 | End: 2021-07-17

## 2021-07-17 RX ORDER — FAMOTIDINE 10 MG/ML
20 INJECTION INTRAVENOUS ONCE
Refills: 0 | Status: COMPLETED | OUTPATIENT
Start: 2021-07-17 | End: 2021-07-17

## 2021-07-17 RX ORDER — ACETAMINOPHEN 500 MG
1000 TABLET ORAL ONCE
Refills: 0 | Status: COMPLETED | OUTPATIENT
Start: 2021-07-17 | End: 2021-07-18

## 2021-07-17 RX ORDER — LIDOCAINE 4 G/100G
10 CREAM TOPICAL ONCE
Refills: 0 | Status: COMPLETED | OUTPATIENT
Start: 2021-07-17 | End: 2021-07-17

## 2021-07-17 RX ORDER — POTASSIUM CHLORIDE 20 MEQ
40 PACKET (EA) ORAL ONCE
Refills: 0 | Status: COMPLETED | OUTPATIENT
Start: 2021-07-17 | End: 2021-07-17

## 2021-07-17 RX ADMIN — LIDOCAINE 10 MILLILITER(S): 4 CREAM TOPICAL at 23:56

## 2021-07-17 RX ADMIN — Medication 30 MILLILITER(S): at 23:56

## 2021-07-17 RX ADMIN — SODIUM CHLORIDE 1000 MILLILITER(S): 9 INJECTION INTRAMUSCULAR; INTRAVENOUS; SUBCUTANEOUS at 21:32

## 2021-07-17 RX ADMIN — Medication 1 MILLIGRAM(S): at 20:20

## 2021-07-17 RX ADMIN — SODIUM CHLORIDE 1000 MILLILITER(S): 9 INJECTION INTRAMUSCULAR; INTRAVENOUS; SUBCUTANEOUS at 20:22

## 2021-07-17 RX ADMIN — FAMOTIDINE 20 MILLIGRAM(S): 10 INJECTION INTRAVENOUS at 20:21

## 2021-07-17 RX ADMIN — ONDANSETRON 4 MILLIGRAM(S): 8 TABLET, FILM COATED ORAL at 20:21

## 2021-07-17 RX ADMIN — Medication 25 MILLIGRAM(S): at 20:20

## 2021-07-17 RX ADMIN — Medication 40 MILLIEQUIVALENT(S): at 21:29

## 2021-07-17 RX ADMIN — PANTOPRAZOLE SODIUM 40 MILLIGRAM(S): 20 TABLET, DELAYED RELEASE ORAL at 23:56

## 2021-07-17 NOTE — ED ADULT NURSE NOTE - DOES PATIENT HAVE ADVANCE DIRECTIVE
[Post EVLT/VNUS/Phlebectomy] : Post EVLT/VNUS/Phlebectomy: Dressing checked for bleeding, Vital Signs: On admission, then q15 mins x 2 No

## 2021-07-17 NOTE — ED ADULT NURSE NOTE - NS ED PATIENT SAFETY CONCERN
Rosuvastatin     Last Written Prescription Date: 8/10/2017  Last Fill Quantity: 90, # refills: 0  Last Office Visit with [facility], [facility] or [facility] prescribing provider: 6/30/2017       Lab Results   Component Value Date    CHOL 136 10/03/2016     Lab Results   Component Value Date    HDL 52 10/03/2016     Lab Results   Component Value Date    LDL 50 10/03/2016     Lab Results   Component Value Date    TRIG 169 10/03/2016     Lab Results   Component Value Date    CHOLHDLRATIO 2.3 01/12/2015     Medication is being filled for 1 time refill only due to:  Patient needs to be seen because due for follow up and fasting labs.     Letter Sent.     Prescription approved per [facility] Refill Protocol.    Varsha LOVE RN, BSN, PHN  [place] [facility] RN    
lov 6/30/17  
No

## 2021-07-17 NOTE — ED ADULT NURSE NOTE - OBJECTIVE STATEMENT
pt is 30y female, here for ETOH withdrawal, pt reports she drinks 1l of liquor daily, last drink at 3pm today, since this evening experiencing n/v/d, burning abd pain and headache, pt reports hx of similar sx in the past, also reports hx of hyperthyroid, no other medical hx, pt is A&Ox3, speaking in full sentences, abd soft, non-distended, non-tender, no tremor or diaphoresis noted, NAD present

## 2021-07-17 NOTE — ED ADULT NURSE NOTE - CHIEF COMPLAINT QUOTE
"I've been drinking everyday since the 26th" reports drinking 1L [place] everyday, last drink at 3pm today. Pt reports abd pain, n/v, headache, tremors, denies hx of seizures. Pt denies SI/HI.

## 2021-07-17 NOTE — ED PROVIDER NOTE - CLINICAL SUMMARY MEDICAL DECISION MAKING FREE TEXT BOX
31 y/o F patient presents to ED with anxiety tremors, nausea, vomiting, and abdominal pain. Patient was tachycardic upon arrival with tremors.       Plan: Will administer fluids, Zofran, Ativan, Librium and Pepcid. Will reevaluate. 31 y/o F patient presents to ED with anxiety tremors, nausea, vomiting, and abdominal pain. Patient was tachycardic upon arrival with tremors.       Plan: Will administer fluids, Zofran, Ativan, Librium and Pepcid. Will reevaluate.    Pt observed for multiple hours still with minimal improvement - inability to tolerate po - nausea, vomiting, headache with CIWA score of 11.  Will require admission for continued antiemetics, and benzos (ativan/librium) for withdrawal.

## 2021-07-17 NOTE — ED ADULT TRIAGE NOTE - CHIEF COMPLAINT QUOTE
"I've been drinking everyday since the 26th" reports drinking 1L [place] everyday, last drink at 3pm today. Pt reports abd pain, n/v, headache, tremors, denies hx of seizures. Pt denies SI/HI.

## 2021-07-17 NOTE — ED PROVIDER NOTE - OBJECTIVE STATEMENT
31 y/o F pt with a pmhx of EtOH abuse [EtOH abuse since 15 years old, now drinks liter of whisky everyday], hypothyroidism [takes levothyroxine] presents to ED s/p binge drinking since June 26. Patient's last drink was at 15:00 today. Since then patient has been experiencing shaking, nausea, vomiting, epigastric abdominal. Patient endorses current symptoms are similar to symptoms of withdrawal. Patient denies any hx of withdrawal seizures, but reports previous withdrawal admissions. Denies drug use. Pt denies hx of pancreatitis.

## 2021-07-18 ENCOUNTER — INPATIENT (INPATIENT)
Facility: HOSPITAL | Age: 31
LOS: 0 days | Discharge: HOME | DRG: 897 | End: 2021-07-19
Attending: STUDENT IN AN ORGANIZED HEALTH CARE EDUCATION/TRAINING PROGRAM | Admitting: STUDENT IN AN ORGANIZED HEALTH CARE EDUCATION/TRAINING PROGRAM
Payer: COMMERCIAL

## 2021-07-18 DIAGNOSIS — E03.9 HYPOTHYROIDISM, UNSPECIFIED: ICD-10-CM

## 2021-07-18 DIAGNOSIS — R63.8 OTHER SYMPTOMS AND SIGNS CONCERNING FOOD AND FLUID INTAKE: ICD-10-CM

## 2021-07-18 DIAGNOSIS — R11.2 NAUSEA WITH VOMITING, UNSPECIFIED: ICD-10-CM

## 2021-07-18 DIAGNOSIS — J45.909 UNSPECIFIED ASTHMA, UNCOMPLICATED: ICD-10-CM

## 2021-07-18 LAB
ALBUMIN SERPL ELPH-MCNC: 3.4 G/DL — SIGNIFICANT CHANGE UP (ref 3.3–5)
ALP SERPL-CCNC: 65 U/L — SIGNIFICANT CHANGE UP (ref 40–120)
ALT FLD-CCNC: 33 U/L — SIGNIFICANT CHANGE UP (ref 10–45)
AMPHET UR-MCNC: NEGATIVE — SIGNIFICANT CHANGE UP
ANION GAP SERPL CALC-SCNC: 7 MMOL/L — SIGNIFICANT CHANGE UP (ref 5–17)
AST SERPL-CCNC: 78 U/L — HIGH (ref 10–40)
BARBITURATES UR SCN-MCNC: NEGATIVE — SIGNIFICANT CHANGE UP
BASOPHILS # BLD AUTO: 0.03 K/UL — SIGNIFICANT CHANGE UP (ref 0–0.2)
BASOPHILS NFR BLD AUTO: 0.5 % — SIGNIFICANT CHANGE UP (ref 0–2)
BENZODIAZ UR-MCNC: POSITIVE
BILIRUB SERPL-MCNC: 0.5 MG/DL — SIGNIFICANT CHANGE UP (ref 0.2–1.2)
BUN SERPL-MCNC: 7 MG/DL — SIGNIFICANT CHANGE UP (ref 7–23)
CALCIUM SERPL-MCNC: 7.2 MG/DL — LOW (ref 8.4–10.5)
CHLORIDE SERPL-SCNC: 105 MMOL/L — SIGNIFICANT CHANGE UP (ref 96–108)
CO2 SERPL-SCNC: 27 MMOL/L — SIGNIFICANT CHANGE UP (ref 22–31)
COCAINE METAB.OTHER UR-MCNC: NEGATIVE — SIGNIFICANT CHANGE UP
CREAT SERPL-MCNC: 0.82 MG/DL — SIGNIFICANT CHANGE UP (ref 0.5–1.3)
EOSINOPHIL # BLD AUTO: 0.08 K/UL — SIGNIFICANT CHANGE UP (ref 0–0.5)
EOSINOPHIL NFR BLD AUTO: 1.4 % — SIGNIFICANT CHANGE UP (ref 0–6)
FOLATE SERPL-MCNC: 7.1 NG/ML — SIGNIFICANT CHANGE UP
GLUCOSE SERPL-MCNC: 89 MG/DL — SIGNIFICANT CHANGE UP (ref 70–99)
HCT VFR BLD CALC: 36.4 % — SIGNIFICANT CHANGE UP (ref 34.5–45)
HGB BLD-MCNC: 11 G/DL — LOW (ref 11.5–15.5)
IMM GRANULOCYTES NFR BLD AUTO: 0.4 % — SIGNIFICANT CHANGE UP (ref 0–1.5)
LYMPHOCYTES # BLD AUTO: 1.49 K/UL — SIGNIFICANT CHANGE UP (ref 1–3.3)
LYMPHOCYTES # BLD AUTO: 27 % — SIGNIFICANT CHANGE UP (ref 13–44)
MAGNESIUM SERPL-MCNC: 1.5 MG/DL — LOW (ref 1.6–2.6)
MCHC RBC-ENTMCNC: 30.2 GM/DL — LOW (ref 32–36)
MCHC RBC-ENTMCNC: 31.6 PG — SIGNIFICANT CHANGE UP (ref 27–34)
MCV RBC AUTO: 104.6 FL — HIGH (ref 80–100)
METHADONE UR-MCNC: NEGATIVE — SIGNIFICANT CHANGE UP
MONOCYTES # BLD AUTO: 0.47 K/UL — SIGNIFICANT CHANGE UP (ref 0–0.9)
MONOCYTES NFR BLD AUTO: 8.5 % — SIGNIFICANT CHANGE UP (ref 2–14)
NEUTROPHILS # BLD AUTO: 3.43 K/UL — SIGNIFICANT CHANGE UP (ref 1.8–7.4)
NEUTROPHILS NFR BLD AUTO: 62.2 % — SIGNIFICANT CHANGE UP (ref 43–77)
NRBC # BLD: 0 /100 WBCS — SIGNIFICANT CHANGE UP (ref 0–0)
OPIATES UR-MCNC: NEGATIVE — SIGNIFICANT CHANGE UP
PCP SPEC-MCNC: SIGNIFICANT CHANGE UP
PCP UR-MCNC: NEGATIVE — SIGNIFICANT CHANGE UP
PHOSPHATE SERPL-MCNC: 2.3 MG/DL — LOW (ref 2.5–4.5)
PLATELET # BLD AUTO: 113 K/UL — LOW (ref 150–400)
POTASSIUM SERPL-MCNC: 3.7 MMOL/L — SIGNIFICANT CHANGE UP (ref 3.5–5.3)
POTASSIUM SERPL-SCNC: 3.7 MMOL/L — SIGNIFICANT CHANGE UP (ref 3.5–5.3)
PROT SERPL-MCNC: 6 G/DL — SIGNIFICANT CHANGE UP (ref 6–8.3)
RBC # BLD: 3.48 M/UL — LOW (ref 3.8–5.2)
RBC # FLD: 17.4 % — HIGH (ref 10.3–14.5)
SARS-COV-2 RNA SPEC QL NAA+PROBE: SIGNIFICANT CHANGE UP
SODIUM SERPL-SCNC: 139 MMOL/L — SIGNIFICANT CHANGE UP (ref 135–145)
THC UR QL: NEGATIVE — SIGNIFICANT CHANGE UP
TSH SERPL-MCNC: 51.46 UIU/ML — HIGH (ref 0.27–4.2)
VIT B12 SERPL-MCNC: 303 PG/ML — SIGNIFICANT CHANGE UP (ref 232–1245)
WBC # BLD: 5.52 K/UL — SIGNIFICANT CHANGE UP (ref 3.8–10.5)
WBC # FLD AUTO: 5.52 K/UL — SIGNIFICANT CHANGE UP (ref 3.8–10.5)

## 2021-07-18 PROCEDURE — 99223 1ST HOSP IP/OBS HIGH 75: CPT | Mod: GC

## 2021-07-18 PROCEDURE — 74176 CT ABD & PELVIS W/O CONTRAST: CPT | Mod: 26

## 2021-07-18 PROCEDURE — 99222 1ST HOSP IP/OBS MODERATE 55: CPT | Mod: GC

## 2021-07-18 RX ORDER — ONDANSETRON 8 MG/1
4 TABLET, FILM COATED ORAL ONCE
Refills: 0 | Status: COMPLETED | OUTPATIENT
Start: 2021-07-18 | End: 2021-07-18

## 2021-07-18 RX ORDER — SENNA PLUS 8.6 MG/1
1 TABLET ORAL AT BEDTIME
Refills: 0 | Status: DISCONTINUED | OUTPATIENT
Start: 2021-07-18 | End: 2021-07-19

## 2021-07-18 RX ORDER — LEVOTHYROXINE SODIUM 125 MCG
150 TABLET ORAL DAILY
Refills: 0 | Status: DISCONTINUED | OUTPATIENT
Start: 2021-07-18 | End: 2021-07-19

## 2021-07-18 RX ORDER — SODIUM CHLORIDE 9 MG/ML
1000 INJECTION INTRAMUSCULAR; INTRAVENOUS; SUBCUTANEOUS ONCE
Refills: 0 | Status: COMPLETED | OUTPATIENT
Start: 2021-07-18 | End: 2021-07-18

## 2021-07-18 RX ORDER — THIAMINE MONONITRATE (VIT B1) 100 MG
100 TABLET ORAL DAILY
Refills: 0 | Status: DISCONTINUED | OUTPATIENT
Start: 2021-07-18 | End: 2021-07-19

## 2021-07-18 RX ORDER — POLYETHYLENE GLYCOL 3350 17 G/17G
17 POWDER, FOR SOLUTION ORAL DAILY
Refills: 0 | Status: DISCONTINUED | OUTPATIENT
Start: 2021-07-18 | End: 2021-07-19

## 2021-07-18 RX ORDER — LACTULOSE 10 G/15ML
10 SOLUTION ORAL ONCE
Refills: 0 | Status: COMPLETED | OUTPATIENT
Start: 2021-07-18 | End: 2021-07-18

## 2021-07-18 RX ORDER — ONDANSETRON 8 MG/1
4 TABLET, FILM COATED ORAL EVERY 8 HOURS
Refills: 0 | Status: DISCONTINUED | OUTPATIENT
Start: 2021-07-18 | End: 2021-07-19

## 2021-07-18 RX ORDER — SODIUM CHLORIDE 9 MG/ML
1000 INJECTION INTRAMUSCULAR; INTRAVENOUS; SUBCUTANEOUS
Refills: 0 | Status: DISCONTINUED | OUTPATIENT
Start: 2021-07-18 | End: 2021-07-19

## 2021-07-18 RX ORDER — ENOXAPARIN SODIUM 100 MG/ML
40 INJECTION SUBCUTANEOUS EVERY 24 HOURS
Refills: 0 | Status: DISCONTINUED | OUTPATIENT
Start: 2021-07-18 | End: 2021-07-19

## 2021-07-18 RX ORDER — LEVOTHYROXINE SODIUM 125 MCG
1 TABLET ORAL
Qty: 0 | Refills: 0 | DISCHARGE

## 2021-07-18 RX ORDER — ACETAMINOPHEN 500 MG
650 TABLET ORAL EVERY 6 HOURS
Refills: 0 | Status: DISCONTINUED | OUTPATIENT
Start: 2021-07-18 | End: 2021-07-19

## 2021-07-18 RX ORDER — MAGNESIUM SULFATE 500 MG/ML
1 VIAL (ML) INJECTION ONCE
Refills: 0 | Status: COMPLETED | OUTPATIENT
Start: 2021-07-18 | End: 2021-07-18

## 2021-07-18 RX ORDER — FOLIC ACID 0.8 MG
1 TABLET ORAL DAILY
Refills: 0 | Status: DISCONTINUED | OUTPATIENT
Start: 2021-07-18 | End: 2021-07-19

## 2021-07-18 RX ORDER — ALBUTEROL 90 UG/1
2.5 AEROSOL, METERED ORAL EVERY 6 HOURS
Refills: 0 | Status: DISCONTINUED | OUTPATIENT
Start: 2021-07-18 | End: 2021-07-19

## 2021-07-18 RX ORDER — POTASSIUM PHOSPHATE, MONOBASIC POTASSIUM PHOSPHATE, DIBASIC 236; 224 MG/ML; MG/ML
15 INJECTION, SOLUTION INTRAVENOUS ONCE
Refills: 0 | Status: COMPLETED | OUTPATIENT
Start: 2021-07-18 | End: 2021-07-18

## 2021-07-18 RX ORDER — ALBUTEROL 90 UG/1
3 AEROSOL, METERED ORAL
Qty: 0 | Refills: 0 | DISCHARGE

## 2021-07-18 RX ORDER — POTASSIUM CHLORIDE 20 MEQ
10 PACKET (EA) ORAL
Refills: 0 | Status: DISCONTINUED | OUTPATIENT
Start: 2021-07-18 | End: 2021-07-18

## 2021-07-18 RX ADMIN — SODIUM CHLORIDE 100 MILLILITER(S): 9 INJECTION INTRAMUSCULAR; INTRAVENOUS; SUBCUTANEOUS at 10:06

## 2021-07-18 RX ADMIN — SODIUM CHLORIDE 500 MILLILITER(S): 9 INJECTION INTRAMUSCULAR; INTRAVENOUS; SUBCUTANEOUS at 01:21

## 2021-07-18 RX ADMIN — ENOXAPARIN SODIUM 40 MILLIGRAM(S): 100 INJECTION SUBCUTANEOUS at 05:52

## 2021-07-18 RX ADMIN — SENNA PLUS 1 TABLET(S): 8.6 TABLET ORAL at 21:23

## 2021-07-18 RX ADMIN — Medication 1000 MILLIGRAM(S): at 00:13

## 2021-07-18 RX ADMIN — Medication 1 MILLIGRAM(S): at 11:31

## 2021-07-18 RX ADMIN — SODIUM CHLORIDE 100 MILLILITER(S): 9 INJECTION INTRAMUSCULAR; INTRAVENOUS; SUBCUTANEOUS at 21:26

## 2021-07-18 RX ADMIN — POLYETHYLENE GLYCOL 3350 17 GRAM(S): 17 POWDER, FOR SOLUTION ORAL at 11:30

## 2021-07-18 RX ADMIN — SODIUM CHLORIDE 100 MILLILITER(S): 9 INJECTION INTRAMUSCULAR; INTRAVENOUS; SUBCUTANEOUS at 05:19

## 2021-07-18 RX ADMIN — Medication 1 TABLET(S): at 11:31

## 2021-07-18 RX ADMIN — ONDANSETRON 4 MILLIGRAM(S): 8 TABLET, FILM COATED ORAL at 01:21

## 2021-07-18 RX ADMIN — Medication 650 MILLIGRAM(S): at 18:45

## 2021-07-18 RX ADMIN — Medication 100 MILLIGRAM(S): at 11:31

## 2021-07-18 RX ADMIN — LACTULOSE 10 GRAM(S): 10 SOLUTION ORAL at 11:31

## 2021-07-18 RX ADMIN — Medication 2 MILLIGRAM(S): at 01:57

## 2021-07-18 RX ADMIN — POTASSIUM PHOSPHATE, MONOBASIC POTASSIUM PHOSPHATE, DIBASIC 62.5 MILLIMOLE(S): 236; 224 INJECTION, SOLUTION INTRAVENOUS at 04:52

## 2021-07-18 RX ADMIN — ONDANSETRON 4 MILLIGRAM(S): 8 TABLET, FILM COATED ORAL at 21:25

## 2021-07-18 RX ADMIN — Medication 100 GRAM(S): at 02:51

## 2021-07-18 RX ADMIN — ONDANSETRON 4 MILLIGRAM(S): 8 TABLET, FILM COATED ORAL at 11:44

## 2021-07-18 RX ADMIN — Medication 150 MICROGRAM(S): at 05:53

## 2021-07-18 RX ADMIN — SODIUM CHLORIDE 1000 MILLILITER(S): 9 INJECTION INTRAMUSCULAR; INTRAVENOUS; SUBCUTANEOUS at 02:05

## 2021-07-18 RX ADMIN — Medication 650 MILLIGRAM(S): at 19:25

## 2021-07-18 RX ADMIN — Medication 1000 MILLIGRAM(S): at 00:43

## 2021-07-18 RX ADMIN — Medication 1 MILLIGRAM(S): at 21:23

## 2021-07-18 RX ADMIN — Medication 2 MILLIGRAM(S): at 14:26

## 2021-07-18 NOTE — H&P ADULT - PROBLEM SELECTOR PLAN 2
h/o mild intermittent asthma not currently in an exacerbation  - can give PRN albuterol HFA if needed.

## 2021-07-18 NOTE — ED ADULT NURSE REASSESSMENT NOTE - NS ED NURSE REASSESS COMMENT FT1
pt given crackers and juice, tolerated juice but reported increasing abd pain after a few bites of crackers, mild nausea but no v/d, pt now asleep, Dr. Horta updated

## 2021-07-18 NOTE — H&P ADULT - HISTORY OF PRESENT ILLNESS
Tisha Matamoros is a 29 y/o F pt with a PMHx of EtOH abuse (EtOH abuse since 2013, now drinks liter of whisky everyday), hypothyroidism (on levothyroxine) presents to ED s/p binge drinking since June 26. Patient's last drink was at 15:00 today, drinks one pint of Dickson [place] whiskey daily and states that she has been trying to taper alcohol. Since then patient has been experiencing shaking, nausea, vomiting. Patient endorses current symptoms are similar to symptoms of withdrawal. Patient denies any hx of withdrawal seizures, but reports previous withdrawal admissions 4 times for similar symptoms. Denies drug use. Pt denies hx of pancreatitis. ROS + for tremors but negative for issues with memory, gait, or incontinence, also endorses episodes of nausea and vomiting since yesterday and cannot tolerate any PO intake. Otherwise patient denies chest pain, SOB, palpitations, cough, diarrhea, constipation, recent history of traveling or any sick contact.    ED Course  Vitals: T: 98.8 F  HR: 113/min    BP: 134/94    RR: 16/min    SPO2: 99% on RA  Labs s/f: hypokalemia, Hypophosphatemia  EKG: Normal sinus rhythmwith sinus arrhythmia  ED Interventions: Zofran 4mg IV, Ativan 3mg IV, Librium 25mg

## 2021-07-18 NOTE — PROGRESS NOTE ADULT - SUBJECTIVE AND OBJECTIVE BOX
Patient was seen and examined by me at bedside. I agree with resident's note, subjective, objective physical exam, assessment and plan with following modifications/additions.    Greater than 35 minutes spent on total encounter; more than 50% of the visit was spent counseling and/or coordinating care by the attending physician.    30yoF with a PMH of etoh abuse (hx of withdrawal but no DT/siezures/ICU visits), hypothyroidism, mild intermittent asthma presents with n/v and alcohol withdrawal  #Alcohol withdrawal- initially minimal CIWA on my interview 0-1 s/p benzo earlier in am, later in afternoon reported CIWA 9 and s/p 2 IV ativan and seen by me and critical care team for escalation- decision to stay on medicine per crit team, we did standing benzo in addition to PRN per CIWA, will monitor very closely and escalate to SDU if needed  #Nausea and vomiting 2/2 gastritis, unlikely pancreatitis per imaging and minimal lipase- f/u final CT read, advance diet as tolerated   #Macrocytosis and neuropathic symptoms- b12/folate check  #Hypothyroidism- TSH 50's likely in setting of noncompliance restart here, check t4   #Asthma hx- clear lungs now- prn nebs   #DVT px- lovenox    Hamilton Hanna MD [phone]

## 2021-07-18 NOTE — H&P ADULT - PROBLEM SELECTOR PLAN 1
associated with generalized abdominal pain 2/2 Alcohol withdrawal syndrome  - drinks 1 pint per day last drink yesterday at 1500  - has had four prior admissions for etoh w/d, never been intubated   - CIWA by my exam for 8 (for N/V, tremors, mildly anxious, mild agitation). Pt had gotten ativan 2mg IVP in total and librium 25 mg PO x1 by that point  - no e/o wernicke. History negative for gait problems or worsening memory. Exam negative for horizontal nystagmus  PLAN  - ativan 2mg IVP PRN  - Frequent CIWA checks  - B 12  - folate  - thiamine. associated with generalized abdominal pain 2/2 Alcohol withdrawal syndrome  - Lipase 99 will need to r/o pancreatitis  - drinks 1 pint per day last drink yesterday at 1500  - has had four prior admissions for etoh w/d, never been intubated   - CIWA by my exam for 8 (for N/V, tremors, mildly anxious, mild agitation). Pt had gotten ativan 2mg IVP in total and librium 25 mg PO x1 by that point  - no e/o wernicke. History negative for gait problems or worsening memory. Exam negative for horizontal nystagmus  PLAN  - ativan 2mg IVP PRN  - Zofran 4mg IV PRN   - Monitor QTc   - Frequent CIWA checks  - B 12  - folate  - thiamine  - f/u CT abd/pelvis

## 2021-07-18 NOTE — H&P ADULT - NSHPLABSRESULTS_GEN_ALL_CORE
LABS:                        13.2   5.58  )-----------( 154      ( 17 Jul 2021 20:22 )             42.2     07-17    142  |  96  |  8   ----------------------------<  87  3.0<L>   |  31  |  0.79    Ca    9.3      17 Jul 2021 20:22  Phos  2.3     07-17  Mg     1.5     07-17    TPro  8.0  /  Alb  4.6  /  TBili  0.5  /  DBili  x   /  AST  93<H>  /  ALT  44  /  AlkPhos  89  07-17        CAPILLARY BLOOD GLUCOSE                              I & O Summary:      Microbiology:        RADIOLOGY, EKG AND ADDITIONAL TESTS: Reviewed.

## 2021-07-18 NOTE — H&P ADULT - ASSESSMENT
Ms 30yoF with a PMH of etoh abuse, hypothyroidism, mild intermittent asthma presents with Nausea, Vomiting,  tremors and generalized abdominal pain. Currently patient has CIWA of 8 on my exam.

## 2021-07-18 NOTE — CONSULT NOTE ADULT - ASSESSMENT
31 yo F with a PMH of etoh abuse, hypothyroidism, mild intermittent asthma presents with nausea, vomiting,  tremors and generalized abdominal pain, found to have blood ETOH level of approx 3000, admitted for ETOH withdrawal management. Received a total of 5 mg of ativan since admission and a dose of librium 25 mg. After reported CIWA of 9 and dose of ativan, CIWA decreased to 3 upon exam.     Recs:    Neuro  Currently AO x 3, Tx for ETOH withdrawal   -Agree with ativan 1 mg q8 hourly standing for now  -CIWA monitoring as per protocol  -CIWA >8 - give ativan 2 mg IV stat - avoid over-sedation  -SW consult for ETOH cessation counselling     Rest of care as per primary team    Dispo- RMF    Plan of care discussed with Dr. Mena- please reconsult as needed.

## 2021-07-18 NOTE — H&P ADULT - PROBLEM SELECTOR PLAN 4
F:   E: replete to K>4, Mg>2, Phos>2.5  N: CLD for now, advance as tolerated  Ppx: Lovenox     Code Status: Full     Dispo: regional medical floor

## 2021-07-18 NOTE — H&P ADULT - ATTENDING COMMENTS
29 y/o F pt with a PMHx of EtOH abuse (EtOH abuse since 2013, now drinks liter of whisky everyday), hypothyroidism (on levothyroxine) presents to ED after binge drinking for 2 weeks and trying to taper off; now with symptoms of withdrawal including n/v, tremors    #Alcohol withdrawal: s/p Librium 25mg and Ativan 4mg total in ED; CIWA currently 0, pt noted to be lethargic but arousable, answering questions and following commands; no longer have Librium in stock though would hold standing benzos due to lethargy- continue to monitor CIWA and Ativan prn.   #Abd pain: w/ nausea/vomiting in setting of alcohol abuse. Lipase mild; CTAP w/o evidence of pancreatitis though done w/o contrast. s/p 3L NS bolus, c/w maintence fluids. Also w/ hx of constipation, last BM 2 days ago; c/w bowel regimen.

## 2021-07-18 NOTE — H&P ADULT - NSHPPHYSICALEXAM_GEN_ALL_CORE
Vital Signs Last 12 Hrs  T(F): 97.8 (07-18-21 @ 00:42), Max: 98.8 (07-17-21 @ 19:48)  HR: 82 (07-18-21 @ 00:42) (74 - 113)  BP: 95/63 (07-18-21 @ 00:42) (95/63 - 134/94)  BP(mean): --  RR: 16 (07-18-21 @ 00:42) (16 - 16)  SpO2: 99% (07-18-21 @ 00:42) (99% - 99%)    PHYSICAL EXAM:  Constitutional: NAD, comfortable in bed.  HEENT: NC/AT, PERRLA, EOMI, no conjunctival pallor or scleral icterus, MMM  Neck: Supple, no JVD  Respiratory: CTA B/L. No w/r/r.   Cardiovascular: RRR, normal S1 and S2, no m/r/g.   Gastrointestinal: +BS, soft NTND, no guarding or rebound tenderness, no palpable masses   Extremities: wwp; no cyanosis, clubbing or edema. fine tremos notes on fingertips b/l   Vascular: Pulses equal and strong throughout.   Neurological: AAOx3, no CN deficits, strength and sensation intact throughout.   Skin: No gross skin abnormalities or rashes

## 2021-07-18 NOTE — CONSULT NOTE ADULT - SUBJECTIVE AND OBJECTIVE BOX
ICU Consult Note:       HPI:  Tisha Matamoros is a 31 y/o F pt with a PMHx of EtOH abuse (EtOH abuse since 2013, now drinks liter of whisky everyday), hypothyroidism (on levothyroxine) presents to ED s/p binge drinking since June 26. Patient's last drink was at 15:00 today, drinks one pint of [place]ickson [place] whiskey daily and states that she has been trying to taper alcohol. Since then patient has been experiencing shaking, nausea, vomiting. Patient endorses current symptoms are similar to symptoms of withdrawal. Patient denies any hx of withdrawal seizures, but reports previous withdrawal admissions 4 times for similar symptoms. Denies drug use. Pt denies hx of pancreatitis. ROS + for tremors but negative for issues with memory, gait, or incontinence, also endorses episodes of nausea and vomiting since yesterday and cannot tolerate any PO intake. Otherwise patient denies chest pain, SOB, palpitations, cough, diarrhea, constipation, recent history of traveling or any sick contact.    ED Course  Vitals: T: 98.8 F  HR: 113/min    BP: 134/94    RR: 16/min    SPO2: 99% on RA  Labs s/f: hypokalemia, Hypophosphatemia  EKG: Normal sinus rhythmwith sinus arrhythmia  ED Interventions: Zofran 4mg IV, Ativan 3mg IV, Librium 25mg      ICU consult reason- CIWA 9     Allergies    No Known Drug Allergies  shellfish (Swelling)    Intolerances    Home Medications:  albuterol 0.63 mg/3 mL (0.021%) inhalation solution: 3 milliliter(s) inhaled 3 times a day (18 Jul 2021 02:25)  Synthroid 150 mcg (0.15 mg) oral tablet: 1 tab(s) orally once a day (18 Jul 2021 02:25)      SOCIAL HX:     Smoking          ETOH/Illicit drugs          Occupation    PAST MEDICAL & SURGICAL HISTORY:  Asthma    Hypothyroid    No significant past surgical history        FAMILY HISTORY:  :    No known cardiovascular or pulmonary family history     ROS:  See HPI     PHYSICAL EXAM  General: Young female lying in bed in no acute distress  CVS: S1S2+ no murmurs, rubs or gallops   RS: B/L air entry equal, no crackles, rhonchi, or wheeze   PA: soft, nontender, BS+   CNS: alert and oriented x 3  CIWA at time of exam- 3   s/p 1 dose of ativan 2 mg     ICU Vital Signs Last 24 Hrs  T(C): 36.4 (18 Jul 2021 16:25), Max: 37.1 (17 Jul 2021 19:48)  T(F): 97.5 (18 Jul 2021 16:25), Max: 98.8 (17 Jul 2021 19:48)  HR: 86 (18 Jul 2021 16:25) (69 - 113)  BP: 101/67 (18 Jul 2021 16:25) (95/63 - 134/94)  BP(mean): --  ABP: --  ABP(mean): --  RR: 18 (18 Jul 2021 16:25) (16 - 18)  SpO2: 98% (18 Jul 2021 16:25) (95% - 99%)        LABS:                          11.0   5.52  )-----------( 113      ( 18 Jul 2021 07:10 )             36.4                                               07-18    139  |  105  |  7   ----------------------------<  89  3.7   |  27  |  0.82    Ca    7.2<L>      18 Jul 2021 07:11  Phos  2.3     07-17  Mg     1.5     07-17    TPro  6.0  /  Alb  3.4  /  TBili  0.5  /  DBili  x   /  AST  78<H>  /  ALT  33  /  AlkPhos  65  07-18                                                                               LIVER FUNCTIONS - ( 18 Jul 2021 07:11 )  Alb: 3.4 g/dL / Pro: 6.0 g/dL / ALK PHOS: 65 U/L / ALT: 33 U/L / AST: 78 U/L / GGT: x                                                  MEDICATIONS  (STANDING):  enoxaparin Injectable 40 milliGRAM(s) SubCutaneous every 24 hours  folic acid 1 milliGRAM(s) Oral daily  levothyroxine 150 MICROGram(s) Oral daily  LORazepam     Tablet 1 milliGRAM(s) Oral every 8 hours  multivitamin 1 Tablet(s) Oral daily  polyethylene glycol 3350 17 Gram(s) Oral daily  senna 1 Tablet(s) Oral at bedtime  sodium chloride 0.9%. 1000 milliLiter(s) (100 mL/Hr) IV Continuous <Continuous>  thiamine Injectable 100 milliGRAM(s) IV Push daily    MEDICATIONS  (PRN):  acetaminophen   Tablet .. 650 milliGRAM(s) Oral every 6 hours PRN Temp greater or equal to 38.5C (101.3F), Mild Pain (1 - 3)  ALBUTerol    0.083% 2.5 milliGRAM(s) Nebulizer every 6 hours PRN Shortness of Breath and/or Wheezing  ondansetron Injectable 4 milliGRAM(s) IV Push every 8 hours PRN Nausea and/or Vomiting

## 2021-07-19 ENCOUNTER — TRANSCRIPTION ENCOUNTER (OUTPATIENT)
Age: 31
End: 2021-07-19

## 2021-07-19 VITALS
RESPIRATION RATE: 16 BRPM | SYSTOLIC BLOOD PRESSURE: 116 MMHG | TEMPERATURE: 98 F | OXYGEN SATURATION: 98 % | DIASTOLIC BLOOD PRESSURE: 84 MMHG | HEART RATE: 97 BPM

## 2021-07-19 DIAGNOSIS — D75.89 OTHER SPECIFIED DISEASES OF BLOOD AND BLOOD-FORMING ORGANS: ICD-10-CM

## 2021-07-19 DIAGNOSIS — F10.239 ALCOHOL DEPENDENCE WITH WITHDRAWAL, UNSPECIFIED: ICD-10-CM

## 2021-07-19 DIAGNOSIS — Z29.9 ENCOUNTER FOR PROPHYLACTIC MEASURES, UNSPECIFIED: ICD-10-CM

## 2021-07-19 DIAGNOSIS — R11.0 NAUSEA: ICD-10-CM

## 2021-07-19 LAB
ALBUMIN SERPL ELPH-MCNC: 3.4 G/DL — SIGNIFICANT CHANGE UP (ref 3.3–5)
ALP SERPL-CCNC: 61 U/L — SIGNIFICANT CHANGE UP (ref 40–120)
ALT FLD-CCNC: 28 U/L — SIGNIFICANT CHANGE UP (ref 10–45)
ANION GAP SERPL CALC-SCNC: 8 MMOL/L — SIGNIFICANT CHANGE UP (ref 5–17)
AST SERPL-CCNC: 54 U/L — HIGH (ref 10–40)
BASOPHILS # BLD AUTO: 0.03 K/UL — SIGNIFICANT CHANGE UP (ref 0–0.2)
BASOPHILS NFR BLD AUTO: 0.6 % — SIGNIFICANT CHANGE UP (ref 0–2)
BILIRUB SERPL-MCNC: 0.6 MG/DL — SIGNIFICANT CHANGE UP (ref 0.2–1.2)
BUN SERPL-MCNC: 4 MG/DL — LOW (ref 7–23)
CALCIUM SERPL-MCNC: 7.3 MG/DL — LOW (ref 8.4–10.5)
CHLORIDE SERPL-SCNC: 103 MMOL/L — SIGNIFICANT CHANGE UP (ref 96–108)
CO2 SERPL-SCNC: 27 MMOL/L — SIGNIFICANT CHANGE UP (ref 22–31)
COVID-19 SPIKE DOMAIN AB INTERP: NEGATIVE — SIGNIFICANT CHANGE UP
COVID-19 SPIKE DOMAIN ANTIBODY RESULT: 0.4 U/ML — SIGNIFICANT CHANGE UP
CREAT SERPL-MCNC: 0.81 MG/DL — SIGNIFICANT CHANGE UP (ref 0.5–1.3)
EOSINOPHIL # BLD AUTO: 0.11 K/UL — SIGNIFICANT CHANGE UP (ref 0–0.5)
EOSINOPHIL NFR BLD AUTO: 2.1 % — SIGNIFICANT CHANGE UP (ref 0–6)
FOLATE SERPL-MCNC: 11.7 NG/ML — SIGNIFICANT CHANGE UP
GLUCOSE SERPL-MCNC: 87 MG/DL — SIGNIFICANT CHANGE UP (ref 70–99)
HCT VFR BLD CALC: 40.5 % — SIGNIFICANT CHANGE UP (ref 34.5–45)
HGB BLD-MCNC: 12.2 G/DL — SIGNIFICANT CHANGE UP (ref 11.5–15.5)
IMM GRANULOCYTES NFR BLD AUTO: 0.4 % — SIGNIFICANT CHANGE UP (ref 0–1.5)
LIDOCAIN IGE QN: 59 U/L — SIGNIFICANT CHANGE UP (ref 7–60)
LYMPHOCYTES # BLD AUTO: 1.45 K/UL — SIGNIFICANT CHANGE UP (ref 1–3.3)
LYMPHOCYTES # BLD AUTO: 28.3 % — SIGNIFICANT CHANGE UP (ref 13–44)
MAGNESIUM SERPL-MCNC: 1.5 MG/DL — LOW (ref 1.6–2.6)
MCHC RBC-ENTMCNC: 30.1 GM/DL — LOW (ref 32–36)
MCHC RBC-ENTMCNC: 31.6 PG — SIGNIFICANT CHANGE UP (ref 27–34)
MCV RBC AUTO: 104.9 FL — HIGH (ref 80–100)
MONOCYTES # BLD AUTO: 0.38 K/UL — SIGNIFICANT CHANGE UP (ref 0–0.9)
MONOCYTES NFR BLD AUTO: 7.4 % — SIGNIFICANT CHANGE UP (ref 2–14)
NEUTROPHILS # BLD AUTO: 3.14 K/UL — SIGNIFICANT CHANGE UP (ref 1.8–7.4)
NEUTROPHILS NFR BLD AUTO: 61.2 % — SIGNIFICANT CHANGE UP (ref 43–77)
NRBC # BLD: 0 /100 WBCS — SIGNIFICANT CHANGE UP (ref 0–0)
PHOSPHATE SERPL-MCNC: 2 MG/DL — LOW (ref 2.5–4.5)
PLATELET # BLD AUTO: 125 K/UL — LOW (ref 150–400)
POTASSIUM SERPL-MCNC: 3.4 MMOL/L — LOW (ref 3.5–5.3)
POTASSIUM SERPL-SCNC: 3.4 MMOL/L — LOW (ref 3.5–5.3)
PROT SERPL-MCNC: 6.4 G/DL — SIGNIFICANT CHANGE UP (ref 6–8.3)
RBC # BLD: 3.86 M/UL — SIGNIFICANT CHANGE UP (ref 3.8–5.2)
RBC # FLD: 17.3 % — HIGH (ref 10.3–14.5)
SARS-COV-2 IGG+IGM SERPL QL IA: 0.4 U/ML — SIGNIFICANT CHANGE UP
SARS-COV-2 IGG+IGM SERPL QL IA: NEGATIVE — SIGNIFICANT CHANGE UP
SODIUM SERPL-SCNC: 138 MMOL/L — SIGNIFICANT CHANGE UP (ref 135–145)
VIT B12 SERPL-MCNC: 331 PG/ML — SIGNIFICANT CHANGE UP (ref 232–1245)
WBC # BLD: 5.13 K/UL — SIGNIFICANT CHANGE UP (ref 3.8–10.5)
WBC # FLD AUTO: 5.13 K/UL — SIGNIFICANT CHANGE UP (ref 3.8–10.5)

## 2021-07-19 PROCEDURE — U0003: CPT

## 2021-07-19 PROCEDURE — 82746 ASSAY OF FOLIC ACID SERUM: CPT

## 2021-07-19 PROCEDURE — 80053 COMPREHEN METABOLIC PANEL: CPT

## 2021-07-19 PROCEDURE — 85025 COMPLETE CBC W/AUTO DIFF WBC: CPT

## 2021-07-19 PROCEDURE — 83735 ASSAY OF MAGNESIUM: CPT

## 2021-07-19 PROCEDURE — 99233 SBSQ HOSP IP/OBS HIGH 50: CPT | Mod: GC

## 2021-07-19 PROCEDURE — 96374 THER/PROPH/DIAG INJ IV PUSH: CPT

## 2021-07-19 PROCEDURE — 80307 DRUG TEST PRSMV CHEM ANLYZR: CPT

## 2021-07-19 PROCEDURE — 96376 TX/PRO/DX INJ SAME DRUG ADON: CPT

## 2021-07-19 PROCEDURE — 83690 ASSAY OF LIPASE: CPT

## 2021-07-19 PROCEDURE — 82607 VITAMIN B-12: CPT

## 2021-07-19 PROCEDURE — 86769 SARS-COV-2 COVID-19 ANTIBODY: CPT

## 2021-07-19 PROCEDURE — 84100 ASSAY OF PHOSPHORUS: CPT

## 2021-07-19 PROCEDURE — 93005 ELECTROCARDIOGRAM TRACING: CPT

## 2021-07-19 PROCEDURE — 74176 CT ABD & PELVIS W/O CONTRAST: CPT

## 2021-07-19 PROCEDURE — 96375 TX/PRO/DX INJ NEW DRUG ADDON: CPT

## 2021-07-19 PROCEDURE — 99285 EMERGENCY DEPT VISIT HI MDM: CPT

## 2021-07-19 PROCEDURE — 36415 COLL VENOUS BLD VENIPUNCTURE: CPT

## 2021-07-19 PROCEDURE — 84702 CHORIONIC GONADOTROPIN TEST: CPT

## 2021-07-19 PROCEDURE — 84443 ASSAY THYROID STIM HORMONE: CPT

## 2021-07-19 PROCEDURE — U0005: CPT

## 2021-07-19 RX ORDER — SODIUM,POTASSIUM PHOSPHATES 278-250MG
2 POWDER IN PACKET (EA) ORAL ONCE
Refills: 0 | Status: COMPLETED | OUTPATIENT
Start: 2021-07-19 | End: 2021-07-19

## 2021-07-19 RX ORDER — LANOLIN ALCOHOL/MO/W.PET/CERES
5 CREAM (GRAM) TOPICAL ONCE
Refills: 0 | Status: COMPLETED | OUTPATIENT
Start: 2021-07-19 | End: 2021-07-19

## 2021-07-19 RX ORDER — MAGNESIUM SULFATE 500 MG/ML
4 VIAL (ML) INJECTION ONCE
Refills: 0 | Status: COMPLETED | OUTPATIENT
Start: 2021-07-19 | End: 2021-07-19

## 2021-07-19 RX ORDER — POTASSIUM CHLORIDE 20 MEQ
40 PACKET (EA) ORAL ONCE
Refills: 0 | Status: COMPLETED | OUTPATIENT
Start: 2021-07-19 | End: 2021-07-19

## 2021-07-19 RX ADMIN — Medication 40 MILLIEQUIVALENT(S): at 08:59

## 2021-07-19 RX ADMIN — ENOXAPARIN SODIUM 40 MILLIGRAM(S): 100 INJECTION SUBCUTANEOUS at 06:00

## 2021-07-19 RX ADMIN — Medication 1 TABLET(S): at 11:11

## 2021-07-19 RX ADMIN — Medication 100 MILLIGRAM(S): at 11:11

## 2021-07-19 RX ADMIN — Medication 5 MILLIGRAM(S): at 01:18

## 2021-07-19 RX ADMIN — SODIUM CHLORIDE 100 MILLILITER(S): 9 INJECTION INTRAMUSCULAR; INTRAVENOUS; SUBCUTANEOUS at 07:18

## 2021-07-19 RX ADMIN — Medication 2 PACKET(S): at 08:59

## 2021-07-19 RX ADMIN — Medication 1 MILLIGRAM(S): at 06:00

## 2021-07-19 RX ADMIN — Medication 150 MICROGRAM(S): at 06:00

## 2021-07-19 RX ADMIN — Medication 100 GRAM(S): at 09:01

## 2021-07-19 RX ADMIN — Medication 1 MILLIGRAM(S): at 11:11

## 2021-07-19 RX ADMIN — POLYETHYLENE GLYCOL 3350 17 GRAM(S): 17 POWDER, FOR SOLUTION ORAL at 11:11

## 2021-07-19 NOTE — PROGRESS NOTE ADULT - PROBLEM SELECTOR PLAN 2
Had nausea i/s/o alcohol withdrawal, was given Zofran in ED. Symptoms improved through the hospital course, slowly building up tolerance to food.

## 2021-07-19 NOTE — PROGRESS NOTE ADULT - PROBLEM SELECTOR PLAN 3
Had MCV of 104.9 likely i/s/o b12/folate deficiency as well as hypothyroid. Patient states she eats a lot of fried foods, not a lot of vegetables.   -Multivitamin, thiamine, and folate at home  - Follow up with PCP about dietary health

## 2021-07-19 NOTE — DISCHARGE NOTE PROVIDER - NSDCCPCAREPLAN_GEN_ALL_CORE_FT
PRINCIPAL DISCHARGE DIAGNOSIS  Diagnosis: Alcohol withdrawal  Assessment and Plan of Treatment:        PRINCIPAL DISCHARGE DIAGNOSIS  Diagnosis: Alcohol abuse, daily use  Assessment and Plan of Treatment: You came to the hospital due to recent symptoms of nausea, vomiting, and generalized abdominal pain. The likely cause of these symptoms relate to the use of excessive amounts of alcohol which puts you increased risk to develop complications that include, but are not limited to: pancreatitis, seizures, gastrointestinal bleed, and death. If you would like to cut back or discontinue your regular consumption of alcohol, it is recommended that you employ resources available in your local community such Alcoholics Anonymous.  In addition, please follow-up with your outpatient Primary Care Physician within 1-2 weeks after you leave the hospital.

## 2021-07-19 NOTE — PROGRESS NOTE ADULT - PROBLEM SELECTOR PLAN 1
30yoF with a PMH of alcohol use disorder and PADMAJA with prior admissions of withdrawal (no seizures, ICU, or intubation), hypothyroidism, mild intermittent asthma presented with n/v, abdominal pain i/s/o alcohol withdrawal, found to have elevated lipase (99). She  drinks 1 liter of whiskey per day, last drink was afternoon of overnight admission. CT did not suggest acute pancreatitis, Improved with ativan 1g q8h then had improving CIWAS of 0-1. Lipase downtrended to 55. Nausea and appetite improved, patient is interested in group accountability sessions for EToh use disorder   -Ativan 2mg qh for CIWA>8  -Did not require ativan for most of day

## 2021-07-19 NOTE — DISCHARGE NOTE NURSING/CASE MANAGEMENT/SOCIAL WORK - PATIENT PORTAL LINK FT
You can access the FollowMyHealth Patient Portal offered by [facility] by registering at the following website: [email or web address]. By joining [facility]’s FollowMyHealth portal, you will also be able to view your health information using other applications (apps) compatible with our system.

## 2021-07-19 NOTE — DISCHARGE NOTE NURSING/CASE MANAGEMENT/SOCIAL WORK - NSDCFUADDAPPT_GEN_ALL_CORE_FT
Please bring your Insurance card, Photo ID and Discharge paperwork to the following appointment:    (1) Please follow up with your Primary Care Provider, Dr. Tricia Carrasco at [address] [place] [address] on 07/28/2021 at 11:20am.    Appointment was scheduled by Ms. KATELIN Boswell, Referral Coordinator.

## 2021-07-19 NOTE — PROGRESS NOTE ADULT - PROBLEM SELECTOR PLAN 4
Home medication of levothyroxine 150mcg  -Follow up with Dr Carrasco (PCP) about adherence and T4 levels

## 2021-07-19 NOTE — PROGRESS NOTE ADULT - ATTENDING COMMENTS
30F with EtOH abuse, Hypothyroid who presented with diffuse abdominal pain, nausea/vomiting, found to be in EtOH withdrawal. Had elevated CIWA yesterday so was placed on standing Ativan, CIWA 2-3 this morning.     A/P  -EtOH Withdrawal - Hold Ativan, symptom trigger only, AA referral   -Hypothroid - Continue Synthroid TSH likely non adherence as outpatient   -Macrocytosis - likely from hypothyroid   -Gastritis - likely 2/2 alcohol, causing abdominal pain  -HypoK - replete    Rest of problems as above, plan discussed with patient and residents bedside

## 2021-07-19 NOTE — PROGRESS NOTE ADULT - SUBJECTIVE AND OBJECTIVE BOX
INCOMPLETE    OVERNIGHT EVENTS:    SUBJECTIVE / INTERVAL HPI: Patient seen and examined at bedside.     VITAL SIGNS:  Vital Signs Last 24 Hrs  T(C): 36.3 (19 Jul 2021 04:25), Max: 36.7 (18 Jul 2021 20:25)  T(F): 97.4 (19 Jul 2021 04:25), Max: 98.1 (18 Jul 2021 20:25)  HR: 72 (19 Jul 2021 04:25) (64 - 87)  BP: 113/79 (19 Jul 2021 04:25) (101/67 - 125/83)  BP(mean): --  RR: 18 (19 Jul 2021 04:25) (16 - 18)  SpO2: 98% (19 Jul 2021 04:25) (97% - 100%)    PHYSICAL EXAM:    General: Well developed, well nourished, no acute distress  HEENT: NC/AT; PERRL, anicteric sclera; MMM  Neck: supple  Cardiovascular: +S1/S2, RRR, no murmurs, rubs, gallops  Respiratory: CTA B/L; no W/R/R  Gastrointestinal: soft, NT/ND; +BSx4  Extremities: WWP; no edema, clubbing or cyanosis  Vascular: 2+ radial, DP/PT pulses B/L  Neurological: AAOx3; no focal deficits    MEDICATIONS:  MEDICATIONS  (STANDING):  enoxaparin Injectable 40 milliGRAM(s) SubCutaneous every 24 hours  folic acid 1 milliGRAM(s) Oral daily  levothyroxine 150 MICROGram(s) Oral daily  LORazepam     Tablet 1 milliGRAM(s) Oral every 8 hours  multivitamin 1 Tablet(s) Oral daily  polyethylene glycol 3350 17 Gram(s) Oral daily  senna 1 Tablet(s) Oral at bedtime  sodium chloride 0.9%. 1000 milliLiter(s) (100 mL/Hr) IV Continuous <Continuous>  thiamine Injectable 100 milliGRAM(s) IV Push daily    MEDICATIONS  (PRN):  acetaminophen   Tablet .. 650 milliGRAM(s) Oral every 6 hours PRN Temp greater or equal to 38.5C (101.3F), Mild Pain (1 - 3)  ALBUTerol    0.083% 2.5 milliGRAM(s) Nebulizer every 6 hours PRN Shortness of Breath and/or Wheezing  ondansetron Injectable 4 milliGRAM(s) IV Push every 8 hours PRN Nausea and/or Vomiting      ALLERGIES:  Allergies    No Known Drug Allergies  shellfish (Swelling)    Intolerances        LABS:                        11.0   5.52  )-----------( 113      ( 18 Jul 2021 07:10 )             36.4     07-18    139  |  105  |  7   ----------------------------<  89  3.7   |  27  |  0.82    Ca    7.2<L>      18 Jul 2021 07:11  Phos  2.3     07-17  Mg     1.5     07-17    TPro  6.0  /  Alb  3.4  /  TBili  0.5  /  DBili  x   /  AST  78<H>  /  ALT  33  /  AlkPhos  65  07-18        CAPILLARY BLOOD GLUCOSE          RADIOLOGY & ADDITIONAL TESTS: Reviewed.    PLAN:    OVERNIGHT EVENTS: CIWA 2    SUBJECTIVE / INTERVAL HPI: Patient seen and examined at bedside. Endorsed mild nausea poor appetite. Denied visual or tactile hallucinations. Headache improving. Had loose stool in the morning which was "yellow-green".      VITAL SIGNS:  Vital Signs Last 24 Hrs  T(C): 36.3 (19 Jul 2021 04:25), Max: 36.7 (18 Jul 2021 20:25)  T(F): 97.4 (19 Jul 2021 04:25), Max: 98.1 (18 Jul 2021 20:25)  HR: 72 (19 Jul 2021 04:25) (64 - 87)  BP: 113/79 (19 Jul 2021 04:25) (101/67 - 125/83)  BP(mean): --  RR: 18 (19 Jul 2021 04:25) (16 - 18)  SpO2: 98% (19 Jul 2021 04:25) (97% - 100%)    PHYSICAL EXAM:    General: No acute distress  HEENT: NC/AT; PERRL, anicteric sclera; MMM  Neck: supple  Cardiovascular: +S1/S2, RRR, no murmurs, rubs, gallops  Respiratory: CTA B/L; no W/R/R  Gastrointestinal: soft, NT/ND; +BSx4. (-)[place]  Extremities: WWP; no edema, clubbing or cyanosis  Vascular: 2+ radial, DP/PT pulses B/L  Neurological: AAOx3; no focal deficits    MEDICATIONS:  MEDICATIONS  (STANDING):  enoxaparin Injectable 40 milliGRAM(s) SubCutaneous every 24 hours  folic acid 1 milliGRAM(s) Oral daily  levothyroxine 150 MICROGram(s) Oral daily  LORazepam     Tablet 1 milliGRAM(s) Oral every 8 hours  multivitamin 1 Tablet(s) Oral daily  polyethylene glycol 3350 17 Gram(s) Oral daily  senna 1 Tablet(s) Oral at bedtime  sodium chloride 0.9%. 1000 milliLiter(s) (100 mL/Hr) IV Continuous <Continuous>  thiamine Injectable 100 milliGRAM(s) IV Push daily    MEDICATIONS  (PRN):  acetaminophen   Tablet .. 650 milliGRAM(s) Oral every 6 hours PRN Temp greater or equal to 38.5C (101.3F), Mild Pain (1 - 3)  ALBUTerol    0.083% 2.5 milliGRAM(s) Nebulizer every 6 hours PRN Shortness of Breath and/or Wheezing  ondansetron Injectable 4 milliGRAM(s) IV Push every 8 hours PRN Nausea and/or Vomiting      ALLERGIES:  Allergies    No Known Drug Allergies  shellfish (Swelling)    Intolerances        LABS:                        11.0   5.52  )-----------( 113      ( 18 Jul 2021 07:10 )             36.4     07-18    139  |  105  |  7   ----------------------------<  89  3.7   |  27  |  0.82    Ca    7.2<L>      18 Jul 2021 07:11  Phos  2.3     07-17  Mg     1.5     07-17    TPro  6.0  /  Alb  3.4  /  TBili  0.5  /  DBili  x   /  AST  78<H>  /  ALT  33  /  AlkPhos  65  07-18        CAPILLARY BLOOD GLUCOSE          RADIOLOGY & ADDITIONAL TESTS: Reviewed.    PLAN:

## 2021-07-19 NOTE — DISCHARGE NOTE PROVIDER - NSDCFUADDAPPT_GEN_ALL_CORE_FT
Please bring your Insurance card, Photo ID and Discharge paperwork to the following appointment:    (1) Please follow up with your Primary Care Provider, Dr. Tricia Carrasco at [address] [place] [address] on 07/28/2021 at 11:20am.    Appointment was scheduled by Ms. KATELIN Boswell, Referral Coordinator.

## 2021-07-19 NOTE — DISCHARGE NOTE PROVIDER - PROVIDER TOKENS
PROVIDER:[TOKEN:[[id number]],ESTABLISHEDPATIENT:[T]] PROVIDER:[TOKEN:[[id number]],SCHEDULEDAPPT:[07/28/2021],SCHEDULEDAPPTTIME:[11:20 AM],ESTABLISHEDPATIENT:[T]]

## 2021-07-19 NOTE — DISCHARGE NOTE PROVIDER - NSDCCONDITION_GEN_ALL_CORE
[TextBox_4] : Mr. SANDOVAL is a 52 year old male with a history of HIV "AIDS", arthritis, HSV 2, GERD, Cardiac arrhythmia, HTN, low testosterone, neuropathy, CLARI, low vitamin D, allergies who video calls into the office today for pulmonary evaluation- c/w Severe OSAS.\par \par His chief concern is discussing his PAP titration study. \par \par Patient states he was diagnosed with CLARI 20 years ago.  He notes he tried CPAP then and did not feel comfortable, so he did not continue with it.  Patient admits to snoring & witnessed apneas.  He states he is in bed at 11:30 PM and does have difficulty initiating sleep.  He states it takes a while for him to "wind down" and fall asleep.  He awakens at 7:15/7:30 A and feels unrestored upon awakening.  He states starting last year he started to have EDS.  during the day he does feel tired and has accidentally dozed off.  He admits that there have been times where he has taken a nap. He admits to awakening with dry mouth and intermittent AM headache. He notes he is followed by Dr. Soler for ENT.  He states Dr. Soler discussed possible surgery, but wanted to explore PAP therapy first.  Patient states his deviated septum affects more than his sleep, he c/o chronic nasal and sinus congestion.  Patient wants to move forward with surgical interventions. \par \par Patient denies chest tightness, SOB @ rest or exertion, wheezing, cough or any other complaints at this time. Stable

## 2021-07-19 NOTE — PROGRESS NOTE ADULT - ASSESSMENT
30yoF with a PMH of alcohol use disorder and PADMAJA with prior admissions of withdrawal (no seizures, ICU, or intubation), hypothyroidism, mild intermittent asthma presented with n/v, abdominal pain i/s/o alcohol withdrawal, found to have elevated lipase (99). CT did not suggest acute pancreatitis, Improved with ativan 1g q8h then had improving CIWAS of 0-1. Lipase downtrended to 55. Nausea and appetite improved, patient is interested in group accountability sessions for EToh use disorder and is ready for discharge.

## 2021-07-19 NOTE — DISCHARGE NOTE PROVIDER - CARE PROVIDER_API CALL
Tricia Carrasco; MPH)  Internal Medicine  [address] [place] [address]  Phone: [phone]  Fax: [phone]  Established Patient  Follow Up Time:    Tricia Carrasco; MPH)  Internal Medicine  [address] [place] [address]  Phone: [phone]  Fax: [phone]  Established Patient  Scheduled Appointment: 07/28/2021 11:20 AM

## 2021-07-19 NOTE — DISCHARGE NOTE PROVIDER - HOSPITAL COURSE
#Discharge: do not delete    Patient is __ yo M/F with past medical history of _____, presented with _____, found to have _____    Inpatient treatment course:     Problem List/Main Diagnoses:     New medications/therapies: none  New lines/hardware: none  Labs to be followed outpatient: none  Exam to be followed outpatient: none    Discharge plan: discharge to home     #Discharge: do not delete    Ms. Valdez is a 29 yo F with a PMHx of alcohol use disorder and PADMAJA with prior admissions of withdrawal (no seizures, ICU, or intubation), hypothyroidism, mild intermittent asthma presented with n/v, abdominal pain i/s/o alcohol withdrawal, found to have elevated lipase (99). In the ED was given Zofran 4mg IV, Ativan 3mg IV, Librium 25mg. CT did not suggest acute pancreatitis. Improved with ativan 1g q8h then had improving CIWAs of 0-1. Lipase downtrended to 55. Nausea and appetite improved, patient is interested in group accountability sessions for EtOH use disorder and is ready for discharge    Problem List:     #Alcohol withdrawal.  History of prior withdrawals (no seizures, ICU, or intubation). Presented for n/v, abdominal pain i/s/o alcohol withdrawal, found to have elevated lipase (99). She drinks 1 liter of whiskey per day, last drink was afternoon of overnight admission. Improved with ativan 1g q8h then had improving CIWAs of 0-1. Lipase downtrended to 55. Nausea and appetite improved, patient is interested in group accountability sessions for EToh use disorder. Did not require ativan for most of day, therefore determined to not be in acute withdrawal and otherwise stable for discharge.  - pt to follow-up w/ PCP on 7/28  - pt encouraged to follow-up at [facility]    #Macrocytosis  Had MCV of 104.9 likely i/s/o b12/folate deficiency as well as hypothyroid. Patient states she eats a lot of fried foods, not a lot of vegetables.   -Multivitamin, thiamine, and folate at home  - Follow up with PCP about dietary health.     #Hypothyroidism.  Home medication of levothyroxine 150mcg. Had macrocytosis which could be attributed to low thyroid hormone.   -Follow up with Dr Carrasco (PCP) about adherence to synthroid, and T4 levels.   - c/w home synthroid    #Asthma. Has home albuterol inhaler  -Continue home albuterol prn.     New medications/therapies: none  New lines/hardware: none  Labs to be followed outpatient: T4 level with PCP  Exam to be followed outpatient: none    Discharge plan: discharge to home

## 2021-07-19 NOTE — PROGRESS NOTE ADULT - PROBLEM SELECTOR PLAN 6
Fluids: NS 100ml/h  Electrolytes: replete as necessary, K>4, Mg>2  Nutrition: consistent carb, DASH TLC  Bowel Regimen:  DVT ppx: loxenox  GI ppx: None  Code: Full  Disposition: Home

## 2021-07-22 ENCOUNTER — NON-APPOINTMENT (OUTPATIENT)
Age: 31
End: 2021-07-22

## 2021-07-26 DIAGNOSIS — E03.9 HYPOTHYROIDISM, UNSPECIFIED: ICD-10-CM
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
## 2021-07-26 DIAGNOSIS — D75.89 OTHER SPECIFIED DISEASES OF BLOOD AND BLOOD-FORMING ORGANS: ICD-10-CM

## 2021-07-26 DIAGNOSIS — F10.239 ALCOHOL DEPENDENCE WITH WITHDRAWAL, UNSPECIFIED: ICD-10-CM

## 2021-07-26 DIAGNOSIS — E87.6 HYPOKALEMIA: ICD-10-CM

## 2021-07-26 DIAGNOSIS — K29.20 ALCOHOLIC GASTRITIS WITHOUT BLEEDING: ICD-10-CM

## 2021-07-26 DIAGNOSIS — J45.20 MILD INTERMITTENT ASTHMA, UNCOMPLICATED: ICD-10-CM

## 2021-07-27 ENCOUNTER — NON-APPOINTMENT (OUTPATIENT)
Age: 31
End: 2021-07-27

## 2021-07-28 ENCOUNTER — APPOINTMENT (OUTPATIENT)
Dept: INTERNAL MEDICINE | Facility: CLINIC | Age: 31
End: 2021-07-28
Payer: COMMERCIAL

## 2021-07-28 ENCOUNTER — LABORATORY RESULT (OUTPATIENT)
Age: 31
End: 2021-07-28

## 2021-07-28 ENCOUNTER — NON-APPOINTMENT (OUTPATIENT)
Age: 31
End: 2021-07-28

## 2021-07-28 VITALS
HEART RATE: 77 BPM | TEMPERATURE: 97.5 F | SYSTOLIC BLOOD PRESSURE: 105 MMHG | BODY MASS INDEX: 24.84 KG/M2 | DIASTOLIC BLOOD PRESSURE: 70 MMHG | OXYGEN SATURATION: 99 % | WEIGHT: 135 LBS | HEIGHT: 62 IN

## 2021-07-28 DIAGNOSIS — N76.0 ACUTE VAGINITIS: ICD-10-CM

## 2021-07-28 DIAGNOSIS — R94.5 ABNORMAL RESULTS OF LIVER FUNCTION STUDIES: ICD-10-CM

## 2021-07-28 DIAGNOSIS — L81.9 DISORDER OF PIGMENTATION, UNSPECIFIED: ICD-10-CM

## 2021-07-28 DIAGNOSIS — R31.29 OTHER MICROSCOPIC HEMATURIA: ICD-10-CM

## 2021-07-28 DIAGNOSIS — F10.20 ALCOHOL DEPENDENCE, UNCOMPLICATED: ICD-10-CM

## 2021-07-28 DIAGNOSIS — E03.9 HYPOTHYROIDISM, UNSPECIFIED: ICD-10-CM

## 2021-07-28 DIAGNOSIS — E87.6 HYPOKALEMIA: ICD-10-CM

## 2021-07-28 DIAGNOSIS — B96.89 ACUTE VAGINITIS: ICD-10-CM

## 2021-07-28 DIAGNOSIS — R79.89 OTHER SPECIFIED ABNORMAL FINDINGS OF BLOOD CHEMISTRY: ICD-10-CM

## 2021-07-28 PROCEDURE — 99495 TRANSJ CARE MGMT MOD F2F 14D: CPT | Mod: 25

## 2021-07-29 DIAGNOSIS — D64.9 ANEMIA, UNSPECIFIED: ICD-10-CM

## 2021-07-29 LAB
25(OH)D3 SERPL-MCNC: 11 NG/ML
ALBUMIN SERPL ELPH-MCNC: 4.2 G/DL
ALP BLD-CCNC: 67 U/L
ALT SERPL-CCNC: 124 U/L
ANION GAP SERPL CALC-SCNC: 15 MMOL/L
APPEARANCE: CLEAR
AST SERPL-CCNC: 86 U/L
BACTERIA: ABNORMAL
BASOPHILS # BLD AUTO: 0.08 K/UL
BASOPHILS NFR BLD AUTO: 0.8 %
BILIRUB SERPL-MCNC: 0.3 MG/DL
BILIRUBIN URINE: NEGATIVE
BLOOD URINE: NEGATIVE
BUN SERPL-MCNC: 9 MG/DL
CALCIUM SERPL-MCNC: 9.1 MG/DL
CHLORIDE SERPL-SCNC: 102 MMOL/L
CO2 SERPL-SCNC: 23 MMOL/L
COLOR: YELLOW
CREAT SERPL-MCNC: 0.76 MG/DL
EOSINOPHIL # BLD AUTO: 0.12 K/UL
EOSINOPHIL NFR BLD AUTO: 1.2 %
GLUCOSE QUALITATIVE U: NEGATIVE
GLUCOSE SERPL-MCNC: 80 MG/DL
HCT VFR BLD CALC: 40.7 %
HGB BLD-MCNC: 11.8 G/DL
HYALINE CASTS: 3 /LPF
IMM GRANULOCYTES NFR BLD AUTO: 0.4 %
KETONES URINE: NEGATIVE
LEUKOCYTE ESTERASE URINE: NEGATIVE
LYMPHOCYTES # BLD AUTO: 1.24 K/UL
LYMPHOCYTES NFR BLD AUTO: 12.8 %
MAN DIFF?: NORMAL
MCHC RBC-ENTMCNC: 29 GM/DL
MCHC RBC-ENTMCNC: 31.6 PG
MCV RBC AUTO: 108.8 FL
MICROSCOPIC-UA: NORMAL
MONOCYTES # BLD AUTO: 1.32 K/UL
MONOCYTES NFR BLD AUTO: 13.7 %
NEUTROPHILS # BLD AUTO: 6.86 K/UL
NEUTROPHILS NFR BLD AUTO: 71.1 %
NITRITE URINE: NEGATIVE
PH URINE: 6
PLATELET # BLD AUTO: 344 K/UL
POTASSIUM SERPL-SCNC: 3.3 MMOL/L
PROT SERPL-MCNC: 6.8 G/DL
PROTEIN URINE: NORMAL
RBC # BLD: 3.74 M/UL
RBC # FLD: 17.2 %
RED BLOOD CELLS URINE: 2 /HPF
SODIUM SERPL-SCNC: 140 MMOL/L
SPECIFIC GRAVITY URINE: 1.02
SQUAMOUS EPITHELIAL CELLS: 10 /HPF
TSH SERPL-ACNC: 31.8 UIU/ML
UROBILINOGEN URINE: NORMAL
WBC # FLD AUTO: 9.66 K/UL
WHITE BLOOD CELLS URINE: 6 /HPF

## 2021-07-30 PROBLEM — F10.20 ALCOHOLISM: Status: ACTIVE | Noted: 2021-01-19

## 2021-07-30 LAB
FERRITIN SERPL-MCNC: 47 NG/ML
FOLATE SERPL-MCNC: 10.9 NG/ML
HCYS SERPL-MCNC: 9.3 UMOL/L
IRON SATN MFR SERPL: 24 %
IRON SERPL-MCNC: 75 UG/DL
TIBC SERPL-MCNC: 311 UG/DL
UIBC SERPL-MCNC: 237 UG/DL
VIT B12 SERPL-MCNC: 316 PG/ML

## 2021-07-30 NOTE — ED ADULT NURSE NOTE - NSFALLRSKASSESSDT_ED_ALL_ED
"Subjective:   Thi Rubalcava  is a 28 y.o. female who presents for Coronavirus Screening (fv from 7/26; sore throat, headache, nasal congestion, SOB,fatigue,slight cough)      Patient identity confirmed using two patient identifiers of last name and date of birth.        HPI     Patient returns to urgent care with continued sore throat, headache and nasal congestion with slight shortness of breath, fatigue, feeling generally weak and mild nonproductive cough.  Patient was seen in urgent care on 7/26/21 for onset of the symptoms which had been present for less than 24 hours.  Patient denies any significant worsening however she is not improving.  She has had no documented fever or chills.  Denies nausea, vomiting, diarrhea.  Denies loss of taste or loss of smell.  Patient has had no known exposure to COVID-19.  She is fully Covid vaccinated.  Review of Systems   Constitutional: Positive for malaise/fatigue. Negative for chills and fever.   HENT: Positive for congestion and sore throat.    Respiratory: Positive for cough and shortness of breath. Negative for sputum production and wheezing.    Gastrointestinal: Negative for diarrhea, nausea and vomiting.   Musculoskeletal: Positive for myalgias.   Neurological: Positive for weakness.   All other systems reviewed and are negative.    No Known Allergies  Reviewed past medical, surgical , social and family history.  Reviewed prescription and over-the-counter medications with patient and electronic health record today.     Objective:   /68 (BP Location: Right arm, Patient Position: Sitting)   Pulse 79   Temp 36.6 °C (97.9 °F) (Temporal)   Resp 16   Ht 1.626 m (5' 4\")   Wt 82.4 kg (181 lb 11.2 oz)   LMP 08/09/2015 (Exact Date)   SpO2 95%   BMI 31.19 kg/m²   Physical Exam  Vitals reviewed.   Constitutional:       General: She is not in acute distress.     Appearance: She is well-developed. She is ill-appearing. She is not toxic-appearing.   HENT:      " Head: Normocephalic and atraumatic.      Right Ear: Tympanic membrane, ear canal and external ear normal.      Left Ear: Tympanic membrane, ear canal and external ear normal.      Nose: Mucosal edema, congestion and rhinorrhea present.      Right Turbinates: Swollen.      Left Turbinates: Swollen.      Mouth/Throat:      Lips: Pink. No lesions.      Mouth: Mucous membranes are moist.      Pharynx: Oropharynx is clear. Uvula midline. Posterior oropharyngeal erythema present. No oropharyngeal exudate.      Tonsils: No tonsillar exudate. 1+ on the right.   Eyes:      General: Lids are normal.      Extraocular Movements: Extraocular movements intact.      Conjunctiva/sclera: Conjunctivae normal.      Pupils: Pupils are equal, round, and reactive to light.   Cardiovascular:      Rate and Rhythm: Normal rate and regular rhythm.      Heart sounds: Normal heart sounds. No murmur heard.   No friction rub. No gallop.    Pulmonary:      Effort: Pulmonary effort is normal. No respiratory distress.      Breath sounds: Normal breath sounds.   Abdominal:      General: Bowel sounds are normal. There is no distension.      Palpations: Abdomen is soft. There is no mass.      Tenderness: There is no abdominal tenderness. There is no guarding or rebound.   Musculoskeletal:         General: No tenderness or deformity. Normal range of motion.      Cervical back: Normal range of motion and neck supple.   Lymphadenopathy:      Head:      Right side of head: No submental, submandibular or tonsillar adenopathy.      Left side of head: No submental, submandibular or tonsillar adenopathy.      Cervical: No cervical adenopathy.      Upper Body:      Right upper body: No supraclavicular adenopathy.      Left upper body: No supraclavicular adenopathy.   Skin:     General: Skin is warm and dry.      Findings: No rash.   Neurological:      Mental Status: She is alert and oriented to person, place, and time.      Cranial Nerves: Cranial nerves are  intact. No cranial nerve deficit.      Sensory: Sensation is intact. No sensory deficit.      Motor: Motor function is intact.      Coordination: Coordination is intact. Coordination normal.      Gait: Gait is intact.   Psychiatric:         Attention and Perception: Attention normal.         Mood and Affect: Mood and affect normal.         Speech: Speech normal.         Behavior: Behavior normal. Behavior is cooperative.         Thought Content: Thought content normal.         Judgment: Judgment normal.           Assessment/Plan:   1. Viral respiratory illness  - benzonatate (TESSALON) 100 MG Cap; Take 2 Capsules by mouth 3 times a day as needed.  Dispense: 60 capsule; Refill: 0  - SARS-CoV-2 PCR (24 hour In-House): Collect NP swab in VTM; Future    2. Cough  - benzonatate (TESSALON) 100 MG Cap; Take 2 Capsules by mouth 3 times a day as needed.  Dispense: 60 capsule; Refill: 0  - SARS-CoV-2 PCR (24 hour In-House): Collect NP swab in VTM; Future       Testing performed for COVID-19.  Patient/guardian is given printed /MyChart instructions regarding self-isolation.  Work/school note is provided with specific return to work/school protocols.  Reviewed with patient/guardian that if they do test positive they will be contacted by their local health department regarding return to work/school protocols.  Results will also be released to patient/guardian in MyChart or called to the patient/guardian directly.  Encouraged mask use, frequent handwashing, wiping down hard surfaces, etc.    Tessalon PRN cough.   Nasal saline rinse  Over-the-counter Flonase nasal spray per 's instructions  Mucinex as needed    Increase fluids, rest.  Patient may use salt water gargles, ice pops, cool fluids.    May use Tylenol or ibuprofen over-the-counter as needed for pain or fever not to exceed recommended daily dose.    Previous urgent care note from date of service 7/26/21 and rapid strep negative for strep reviewed by myself as  part of today's visit.    Differential diagnosis, natural history, supportive care, and indications for immediate follow-up discussed.     Red flag warning symptoms and strict ER/follow-up precautions given.  The patient demonstrated a good understanding and agreed with the treatment plan.    Upon entering exam room I ensured patient was wearing a mask.  This provider wore appropriate PPE throughout entire visit.  Patient wore mask entire visit except for a brief period while examining oropharynx.    Please note that this note was created using voice recognition speech to text software. Every effort has been made to correct obvious errors.  However, I expect there are errors of grammar and possibly context that were not discovered prior to finalizing the note  DAVID Montoya PA-C   17-Jul-2021 20:47

## 2021-07-30 NOTE — HISTORY OF PRESENT ILLNESS
[FreeTextEntry1] : 1. F/u \par 2. SATYA [de-identified] : 7/19 admitted [facility] w abd pain/n/v- elevated lipase, LFT, MCV- CT no evidence of pancreatitis-\par dx alcohol withdrawal, BV\par tx Ativan and fluids, Flagyl \par on discharge, labs downward trend\par ref for f/u PCP and admission to [facility] - pt waiting for call \par h/o alcohol- drink pint Vodka/day\par h/o Hypothyroidism \par h/o asthma- well controlled

## 2021-07-30 NOTE — PHYSICAL EXAM
[No Acute Distress] : no acute distress [Normal Sclera/Conjunctiva] : normal sclera/conjunctiva [Clear to Auscultation] : lungs were clear to auscultation bilaterally [Normal Rate] : normal rate  [Regular Rhythm] : with a regular rhythm [Pedal Pulses Present] : the pedal pulses are present [No Edema] : there was no peripheral edema [Declined Breast Exam] : declined breast exam  [Soft] : abdomen soft [Non Tender] : non-tender [No CVA Tenderness] : no CVA  tenderness [Normal] : affect was normal and insight and judgment were intact [de-identified] : hypopigmented patches arms bilaterally  [de-identified] : no tremor

## 2021-08-03 ENCOUNTER — NON-APPOINTMENT (OUTPATIENT)
Age: 31
End: 2021-08-03

## 2021-08-03 LAB — METHYLMALONATE SERPL-SCNC: 197 NMOL/L

## 2021-08-03 RX ORDER — FOLIC ACID/MULTIVIT,IRON,MINER 0.4MG-18MG
200 TABLET ORAL DAILY
Qty: 90 | Refills: 3 | Status: ACTIVE | COMMUNITY
Start: 2021-08-03 | End: 1900-01-01

## 2021-08-03 RX ORDER — FOLIC ACID 1 MG/1
1 TABLET ORAL DAILY
Qty: 90 | Refills: 3 | Status: ACTIVE | COMMUNITY
Start: 2021-08-03 | End: 1900-01-01

## 2021-08-06 ENCOUNTER — NON-APPOINTMENT (OUTPATIENT)
Age: 31
End: 2021-08-06

## 2021-08-09 ENCOUNTER — NON-APPOINTMENT (OUTPATIENT)
Age: 31
End: 2021-08-09

## 2021-08-11 ENCOUNTER — EMERGENCY (EMERGENCY)
Facility: HOSPITAL | Age: 31
LOS: 1 days | Discharge: HOME | End: 2021-08-11
Attending: EMERGENCY MEDICINE | Admitting: EMERGENCY MEDICINE
Payer: COMMERCIAL

## 2021-08-11 VITALS
OXYGEN SATURATION: 98 % | SYSTOLIC BLOOD PRESSURE: 107 MMHG | HEART RATE: 83 BPM | TEMPERATURE: 98 F | RESPIRATION RATE: 18 BRPM | DIASTOLIC BLOOD PRESSURE: 77 MMHG

## 2021-08-11 VITALS
SYSTOLIC BLOOD PRESSURE: 127 MMHG | OXYGEN SATURATION: 97 % | HEART RATE: 102 BPM | RESPIRATION RATE: 16 BRPM | HEIGHT: 62 IN | WEIGHT: 134.92 LBS | DIASTOLIC BLOOD PRESSURE: 92 MMHG | TEMPERATURE: 98 F

## 2021-08-11 DIAGNOSIS — R00.0 TACHYCARDIA, UNSPECIFIED: ICD-10-CM

## 2021-08-11 DIAGNOSIS — R10.84 GENERALIZED ABDOMINAL PAIN: ICD-10-CM

## 2021-08-11 DIAGNOSIS — R25.1 TREMOR, UNSPECIFIED: ICD-10-CM

## 2021-08-11 DIAGNOSIS — R11.2 NAUSEA WITH VOMITING, UNSPECIFIED: ICD-10-CM

## 2021-08-11 DIAGNOSIS — Z91.013 ALLERGY TO SEAFOOD: ICD-10-CM

## 2021-08-11 DIAGNOSIS — Z79.890 HORMONE REPLACEMENT THERAPY: ICD-10-CM

## 2021-08-11 DIAGNOSIS — J45.909 UNSPECIFIED ASTHMA, UNCOMPLICATED: ICD-10-CM

## 2021-08-11 DIAGNOSIS — E03.9 HYPOTHYROIDISM, UNSPECIFIED: ICD-10-CM

## 2021-08-11 LAB
ALBUMIN SERPL ELPH-MCNC: 4.3 G/DL — SIGNIFICANT CHANGE UP (ref 3.3–5)
ALP SERPL-CCNC: 67 U/L — SIGNIFICANT CHANGE UP (ref 40–120)
ALT FLD-CCNC: 22 U/L — SIGNIFICANT CHANGE UP (ref 10–45)
ANION GAP SERPL CALC-SCNC: 15 MMOL/L — SIGNIFICANT CHANGE UP (ref 5–17)
AST SERPL-CCNC: 30 U/L — SIGNIFICANT CHANGE UP (ref 10–40)
BASOPHILS # BLD AUTO: 0.07 K/UL — SIGNIFICANT CHANGE UP (ref 0–0.2)
BASOPHILS NFR BLD AUTO: 0.6 % — SIGNIFICANT CHANGE UP (ref 0–2)
BILIRUB SERPL-MCNC: 0.8 MG/DL — SIGNIFICANT CHANGE UP (ref 0.2–1.2)
BUN SERPL-MCNC: 6 MG/DL — LOW (ref 7–23)
CALCIUM SERPL-MCNC: 8.1 MG/DL — LOW (ref 8.4–10.5)
CHLORIDE SERPL-SCNC: 99 MMOL/L — SIGNIFICANT CHANGE UP (ref 96–108)
CO2 SERPL-SCNC: 26 MMOL/L — SIGNIFICANT CHANGE UP (ref 22–31)
CREAT SERPL-MCNC: 0.62 MG/DL — SIGNIFICANT CHANGE UP (ref 0.5–1.3)
EOSINOPHIL # BLD AUTO: 0.05 K/UL — SIGNIFICANT CHANGE UP (ref 0–0.5)
EOSINOPHIL NFR BLD AUTO: 0.5 % — SIGNIFICANT CHANGE UP (ref 0–6)
ETHANOL SERPL-MCNC: <10 MG/DL — SIGNIFICANT CHANGE UP (ref 0–10)
GLUCOSE SERPL-MCNC: 82 MG/DL — SIGNIFICANT CHANGE UP (ref 70–99)
HCG SERPL-ACNC: <0 MIU/ML — SIGNIFICANT CHANGE UP
HCT VFR BLD CALC: 35.2 % — SIGNIFICANT CHANGE UP (ref 34.5–45)
HGB BLD-MCNC: 11 G/DL — LOW (ref 11.5–15.5)
IMM GRANULOCYTES NFR BLD AUTO: 0.6 % — SIGNIFICANT CHANGE UP (ref 0–1.5)
LIDOCAIN IGE QN: 37 U/L — SIGNIFICANT CHANGE UP (ref 7–60)
LYMPHOCYTES # BLD AUTO: 1.34 K/UL — SIGNIFICANT CHANGE UP (ref 1–3.3)
LYMPHOCYTES # BLD AUTO: 12.3 % — LOW (ref 13–44)
MCHC RBC-ENTMCNC: 30.8 PG — SIGNIFICANT CHANGE UP (ref 27–34)
MCHC RBC-ENTMCNC: 31.3 GM/DL — LOW (ref 32–36)
MCV RBC AUTO: 98.6 FL — SIGNIFICANT CHANGE UP (ref 80–100)
MONOCYTES # BLD AUTO: 0.73 K/UL — SIGNIFICANT CHANGE UP (ref 0–0.9)
MONOCYTES NFR BLD AUTO: 6.7 % — SIGNIFICANT CHANGE UP (ref 2–14)
NEUTROPHILS # BLD AUTO: 8.64 K/UL — HIGH (ref 1.8–7.4)
NEUTROPHILS NFR BLD AUTO: 79.3 % — HIGH (ref 43–77)
NRBC # BLD: 0 /100 WBCS — SIGNIFICANT CHANGE UP (ref 0–0)
PLATELET # BLD AUTO: 255 K/UL — SIGNIFICANT CHANGE UP (ref 150–400)
POTASSIUM SERPL-MCNC: 3.4 MMOL/L — LOW (ref 3.5–5.3)
POTASSIUM SERPL-SCNC: 3.4 MMOL/L — LOW (ref 3.5–5.3)
PROT SERPL-MCNC: 7.3 G/DL — SIGNIFICANT CHANGE UP (ref 6–8.3)
RBC # BLD: 3.57 M/UL — LOW (ref 3.8–5.2)
RBC # FLD: 16 % — HIGH (ref 10.3–14.5)
SODIUM SERPL-SCNC: 140 MMOL/L — SIGNIFICANT CHANGE UP (ref 135–145)
WBC # BLD: 10.89 K/UL — HIGH (ref 3.8–10.5)
WBC # FLD AUTO: 10.89 K/UL — HIGH (ref 3.8–10.5)

## 2021-08-11 PROCEDURE — 99284 EMERGENCY DEPT VISIT MOD MDM: CPT | Mod: 25

## 2021-08-11 PROCEDURE — 84702 CHORIONIC GONADOTROPIN TEST: CPT

## 2021-08-11 PROCEDURE — 93005 ELECTROCARDIOGRAM TRACING: CPT

## 2021-08-11 PROCEDURE — 80307 DRUG TEST PRSMV CHEM ANLYZR: CPT

## 2021-08-11 PROCEDURE — 36415 COLL VENOUS BLD VENIPUNCTURE: CPT

## 2021-08-11 PROCEDURE — 93010 ELECTROCARDIOGRAM REPORT: CPT

## 2021-08-11 PROCEDURE — 99285 EMERGENCY DEPT VISIT HI MDM: CPT

## 2021-08-11 PROCEDURE — 80053 COMPREHEN METABOLIC PANEL: CPT

## 2021-08-11 PROCEDURE — 85025 COMPLETE CBC W/AUTO DIFF WBC: CPT

## 2021-08-11 PROCEDURE — 96374 THER/PROPH/DIAG INJ IV PUSH: CPT

## 2021-08-11 PROCEDURE — 96375 TX/PRO/DX INJ NEW DRUG ADDON: CPT

## 2021-08-11 PROCEDURE — 83690 ASSAY OF LIPASE: CPT

## 2021-08-11 RX ORDER — ONDANSETRON 8 MG/1
8 TABLET, FILM COATED ORAL ONCE
Refills: 0 | Status: COMPLETED | OUTPATIENT
Start: 2021-08-11 | End: 2021-08-11

## 2021-08-11 RX ORDER — SODIUM CHLORIDE 9 MG/ML
1000 INJECTION, SOLUTION INTRAVENOUS ONCE
Refills: 0 | Status: COMPLETED | OUTPATIENT
Start: 2021-08-11 | End: 2021-08-11

## 2021-08-11 RX ADMIN — Medication 1 MILLIGRAM(S): at 07:28

## 2021-08-11 RX ADMIN — SODIUM CHLORIDE 1000 MILLILITER(S): 9 INJECTION, SOLUTION INTRAVENOUS at 07:28

## 2021-08-11 RX ADMIN — ONDANSETRON 8 MILLIGRAM(S): 8 TABLET, FILM COATED ORAL at 07:28

## 2021-08-11 NOTE — ED PROVIDER NOTE - OBJECTIVE STATEMENT
31yo female with pmhx of ETOH abuse presents with nausea/vomiting and tremors. Pt reports she drinks 1 pint liquor daily, last drink 3PM yesterday. She is scheduled to go to detox facility today at 0930. Now endorsing generalized abdominal pain, nausea/vomiting, and tremors. She denies chest pain, shortness of breath, hallucinations, seizures. She has no prior history of alcohol withdrawal seizures. She denies illicit drug use. Prophylactic measure

## 2021-08-11 NOTE — ED ADULT NURSE NOTE - OBJECTIVE STATEMENT
received A&OX3, ambulatory 30years old female pt with c/o of  " I am going through alcohol withdrawals." pt drinks 1 pint of whiskey every night, last time she had alcohol was on August 10th 3pm. since then, she has been vomiting isabel. no blood in the emesis. abdomen round, non-tender, and non-distended. no fever, chest pain, diarrhea, sob, or coughing. pt is able to talk in full sentences. mild tremor noted. pt reports anxiety and nausea. no hallucinations or sweating. pt denies using any other illict drugs or smoking cigarettes. denies hx of seizure. EKG in progress, will continue to monitor.

## 2021-08-11 NOTE — ED PROVIDER NOTE - CLINICAL SUMMARY MEDICAL DECISION MAKING FREE TEXT BOX
Pt is afebrile and hemodynamically stable. She is mildly tachycardic on arrival, but no hypertension. She was not tremulous on initial exam. She had no abdominal tenderness. Labs unremarkable. HCG neg. ETOH neg. Her symptoms improved after 1L LR, zofran 8mg IV and ativan 1mg IV. Discussed results with pt. She has follow up at detox facility. Return precautions given.

## 2021-08-11 NOTE — ED ADULT NURSE NOTE - NSIMPLEMENTINTERV_GEN_ALL_ED
Implemented All Fall Risk Interventions:  [place] to call system. Call bell, personal items and telephone within reach. Instruct patient to call for assistance. Room bathroom lighting operational. Non-slip footwear when patient is off stretcher. Physically safe environment: no spills, clutter or unnecessary equipment. Stretcher in lowest position, wheels locked, appropriate side rails in place. Provide visual cue, wrist band, yellow gown, etc. Monitor gait and stability. Monitor for mental status changes and reorient to person, place, and time. Review medications for side effects contributing to fall risk. Reinforce activity limits and safety measures with patient and family.

## 2021-08-11 NOTE — ED ADULT TRIAGE NOTE - CHIEF COMPLAINT QUOTE
Pt presents with c/o "alcohol withdrawal", reporting s/s of "N/V and shaking". Pt scheduled to go to detox this AM @ 0930. Normal consumption of one pint daily, last intake yesterday at 3pm per pt. Denies any other substance abuse.

## 2021-08-11 NOTE — ED ADULT NURSE REASSESSMENT NOTE - NS ED NURSE REASSESS COMMENT FT1
Report received from Tommie ORTIZ. Pt sitting comfortably in stretcher. Pt given a warm blanket. Pt informed of plan of care.

## 2021-08-11 NOTE — ED PROVIDER NOTE - NS ED ROS FT
Constitutional: No fever. No chills.  Eyes: No redness. No discharge. No vision change.   ENT: No sore throat. No ear pain.  Cardiovascular: No chest pain. No leg swelling.  Respiratory: No cough. No shortness of breath.  GI: +generalized abdominal pain. +nausea/vomiting. No diarrhea.   MSK: No joint pain. No back pain.   Skin: No rash. No abrasions.   Neuro: No numbness. No weakness. +tremors.  Psych: No known mental health issues.

## 2021-08-11 NOTE — ED PROVIDER NOTE - ATTENDING CONTRIBUTION TO CARE
29 yo female with PMH of ETOH abuse presents with nausea/vomiting and tremors. Reports she drinks 1 pint liquor daily, last drink 3PM yesterday. Scheduled to go to detox facility today at 0930. Now endorsing generalized abdominal pain, nausea/vomiting, and tremors. She denies chest pain, shortness of breath, hallucinations, seizures, HA. She has no prior history of alcohol withdrawal seizures/ DTs. No illicit drug use, pt is afebrile and hemodynamically stable. She is mildly tachycardic on arrival, but no hypertension. AAO, NAD, abd: soft and NT, no tremors noted. Labs unremarkable. HCG neg. ETOH neg. Given IVF, zofran 8mg IV and ativan 1mg IV with improvement in sxs. Has follow up at detox facility. Return precautions given.

## 2021-08-11 NOTE — ED PROVIDER NOTE - PATIENT PORTAL LINK FT
You can access the FollowMyHealth Patient Portal offered by [facility] by registering at the following website: [email or web address]. By joining [employer]’s FollowMyHealth portal, you will also be able to view your health information using other applications (apps) compatible with our system.

## 2021-08-11 NOTE — ED PROVIDER NOTE - PROGRESS NOTE DETAILS
Иван - Labs reviewed and unremarkable. Pt received 1L LR, zofran 8mg IV, and ativan 1mg IV. On reassessment, pt sleeping. Plan for dc to detox facility.

## 2021-08-11 NOTE — ED PROVIDER NOTE - PHYSICAL EXAMINATION
VITAL SIGNS: I have reviewed nursing notes and confirm.  CONSTITUTIONAL: Well-developed; in no acute distress.   SKIN:  warm and dry, no acute rash.   HEAD:  normocephalic, atraumatic.  EYES: PERRL, EOM intact; conjunctiva and sclera clear.  ENT: No nasal discharge; airway clear.   NECK: Supple; non tender.  CARD: S1, S2 normal; no murmurs, gallops, or rubs. Regular rate and rhythm.   RESP:  Clear to auscultation b/l, no wheezes, rales or rhonchi.  ABD: Normal bowel sounds; soft; non-distended; non-tender; no guarding/ rebound.  EXT: Normal ROM. No clubbing, cyanosis or edema. 2+ pulses to b/l ue/le.  NEURO: Alert, oriented, grossly unremarkable. No tremors noted. No tongue fasciculations.   PSYCH: Cooperative, mood and affect appropriate.

## 2021-08-11 NOTE — ED PROVIDER NOTE - IV ALTEPASE ADMIN HIDDEN
Problem: Non-Pressure Injury Wound  Goal: # No deterioration in wound  Outcome: Outcome Met, Continue evaluating goal progress toward completion  Patient presents in clinic today for Clinic Provider follow up Dr Santana.  Patient denies any fever, chills or night sweats, patient reports blood glucose levels averaging 150's, patient educated on blood glucose control and wound healing.  Wound treatment application re-applied as per orders noted, new treatment orders reviewed and noted on AVS. Patient instructed to bring wound vac with next clinic provider follow up in 2 weeks. Patient instructed to obtain all lab work that Dr Burden requested today after clinic appointment.  Follow up clinic appointment scheduled, patient aware of date and time also noted on AVS dispensed today. Patient verbalizes understanding and agrees with plan of care.        
show

## 2021-08-17 ENCOUNTER — NON-APPOINTMENT (OUTPATIENT)
Age: 31
End: 2021-08-17

## 2021-09-08 ENCOUNTER — NON-APPOINTMENT (OUTPATIENT)
Age: 31
End: 2021-09-08

## 2021-09-22 ENCOUNTER — NON-APPOINTMENT (OUTPATIENT)
Age: 31
End: 2021-09-22

## 2021-09-23 ENCOUNTER — NON-APPOINTMENT (OUTPATIENT)
Age: 31
End: 2021-09-23

## 2021-09-28 NOTE — ED PROVIDER NOTE - RATE
92 [

## 2021-10-04 ENCOUNTER — RX RENEWAL (OUTPATIENT)
Age: 31
End: 2021-10-04

## 2021-10-04 RX ORDER — ERGOCALCIFEROL 1.25 MG/1
1.25 MG CAPSULE, LIQUID FILLED ORAL
Qty: 8 | Refills: 2 | Status: ACTIVE | COMMUNITY
Start: 2021-08-03 | End: 1900-01-01

## 2021-10-31 NOTE — PATIENT PROFILE ADULT - NSPROGENBLOODRESTRICT_GEN_A_NUR
No significant changes this shift. Still no BM pt reluctant to getting suppository or enema. Dr. Andreea Leal rounded wants pt evaluated but PT tomorrow morning and also home PT?OT order needed. Problem: Pressure Injury, Risk for  Goal: # Skin remains intact  Outcome: Outcome Not Met, Continue to Monitor  Goal: No new pressure injury (PI) development  Outcome: Outcome Not Met, Continue to Monitor  Goal: # Verbalizes understanding of PI risk factors and prevention strategies  Description: Document education using the patient education activity. Outcome: Outcome Not Met, Continue to Monitor     Problem: Pressure Injury Actual  Goal: # No deterioration in pressure injury (PI)  Outcome: Outcome Not Met, Continue to Monitor  Goal: # Verbalizes pressure injury management  Description: Document education using the patient education activity. Outcome: Outcome Not Met, Continue to Monitor     Problem: Non-Pressure Injury Wound  Goal: # No deterioration in wound  Outcome: Outcome Not Met, Continue to Monitor  Goal: # Verbalizes understanding of wound and wound care  Description: If abnormality is a skin tear, avoid using tape on skin including transparent dressings. Document education using the patient education activity. Outcome: Outcome Not Met, Continue to Monitor  Goal: Participates in wound care activities  Outcome: Outcome Not Met, Continue to Monitor     Problem: At Risk for Falls  Goal: # Patient does not fall  Outcome: Outcome Not Met, Continue to Monitor  Goal: # Takes action to control fall-related risks  Outcome: Outcome Not Met, Continue to Monitor  Goal: # Verbalizes understanding of fall risk/precautions  Description: Document education using the patient education activity  Outcome: Outcome Not Met, Continue to Monitor     Problem:  At Risk for Injury Due to Fall  Goal: # Patient does not fall  Outcome: Outcome Not Met, Continue to Monitor  Goal: # Takes action to control condition specific risks  Outcome: Outcome Not Met, Continue to Monitor  Goal: # Verbalizes understanding of fall-related injury personal risks  Description: Document education using the patient education activity  Outcome: Outcome Not Met, Continue to Monitor     Problem: Pain  Goal: #Acceptable pain level achieved/maintained at rest using NRS/Faces  Description: This goal is used for patients who can self-report. Acceptable means the level is at or below the identified comfort/function goal.  Outcome: Outcome Not Met, Continue to Monitor  Goal: # Acceptable pain level achieved/maintained at rest using NRS/Faces without oversedation (opioid naive or PCA/Epidural infusion)  Description: This goal is used if Opioid-naÃ¯ve or on PCA/Epidural Infusion. Outcome: Outcome Not Met, Continue to Monitor  Goal: # Acceptable pain level achieved/maintained with activity using NRS/Faces  Description: This goal is used for patients who can self-report and are not achieving acceptable pain control during activity. Outcome: Outcome Not Met, Continue to Monitor  Goal: Acceptable pain/comfort level is achieved/maintained at rest (based on Pain Behaviors Scale)  Description: This goal is used for patients who are not able to self-report pain and are assessed for pain using the Pain Behaviors Scale  Outcome: Outcome Not Met, Continue to Monitor  Goal: Acceptable pain/comfort level is achieved/maintained at rest (based on pediatric behavior tool: NIPS, NPASS, or FLACC)  Description: This goal is used for pediatric patients who are not able to self report pain.   Outcome: Outcome Not Met, Continue to Monitor  Goal: # Verbalizes understanding of pain management  Description: Documented in Patient Education Activity  Outcome: Outcome Not Met, Continue to Monitor     Problem: Diabetes  Goal: Glycemic balance achieved/maintained  Description: Goal is to maintain blood sugar within range with no episodes of hypoglycemia  Outcome: Outcome Not Met, Continue to Monitor  Goal: Verbalizes/demonstrates understanding of Diabetes  Description: Document on Patient Education Activity  Outcome: Outcome Not Met, Continue to Monitor  Goal: Demonstrates ability to self-administer insulin  Description: Document on Patient Education Activity  Outcome: Outcome Not Met, Continue to Monitor none

## 2021-11-04 ENCOUNTER — EMERGENCY (EMERGENCY)
Facility: HOSPITAL | Age: 31
LOS: 1 days | Discharge: HOME | End: 2021-11-04
Attending: EMERGENCY MEDICINE | Admitting: EMERGENCY MEDICINE
Payer: COMMERCIAL

## 2021-11-04 VITALS
SYSTOLIC BLOOD PRESSURE: 110 MMHG | OXYGEN SATURATION: 98 % | HEART RATE: 78 BPM | RESPIRATION RATE: 20 BRPM | DIASTOLIC BLOOD PRESSURE: 69 MMHG

## 2021-11-04 VITALS
OXYGEN SATURATION: 97 % | WEIGHT: 134.92 LBS | HEIGHT: 62 IN | SYSTOLIC BLOOD PRESSURE: 133 MMHG | HEART RATE: 108 BPM | TEMPERATURE: 98 F | DIASTOLIC BLOOD PRESSURE: 87 MMHG | RESPIRATION RATE: 18 BRPM

## 2021-11-04 DIAGNOSIS — F10.129 ALCOHOL ABUSE WITH INTOXICATION, UNSPECIFIED: ICD-10-CM

## 2021-11-04 DIAGNOSIS — Z91.013 ALLERGY TO SEAFOOD: ICD-10-CM

## 2021-11-04 DIAGNOSIS — R00.0 TACHYCARDIA, UNSPECIFIED: ICD-10-CM

## 2021-11-04 DIAGNOSIS — R11.0 NAUSEA: ICD-10-CM

## 2021-11-04 DIAGNOSIS — E03.9 HYPOTHYROIDISM, UNSPECIFIED: ICD-10-CM
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
## 2021-11-04 DIAGNOSIS — J45.909 UNSPECIFIED ASTHMA, UNCOMPLICATED: ICD-10-CM

## 2021-11-04 DIAGNOSIS — E86.0 DEHYDRATION: ICD-10-CM

## 2021-11-04 LAB
ALBUMIN SERPL ELPH-MCNC: 4.8 G/DL — SIGNIFICANT CHANGE UP (ref 3.3–5)
ALP SERPL-CCNC: 82 U/L — SIGNIFICANT CHANGE UP (ref 40–120)
ALT FLD-CCNC: 21 U/L — SIGNIFICANT CHANGE UP (ref 10–45)
ANION GAP SERPL CALC-SCNC: 16 MMOL/L — SIGNIFICANT CHANGE UP (ref 5–17)
ANION GAP SERPL CALC-SCNC: 22 MMOL/L — HIGH (ref 5–17)
AST SERPL-CCNC: 45 U/L — HIGH (ref 10–40)
BASOPHILS # BLD AUTO: 0.09 K/UL — SIGNIFICANT CHANGE UP (ref 0–0.2)
BASOPHILS NFR BLD AUTO: 0.9 % — SIGNIFICANT CHANGE UP (ref 0–2)
BILIRUB SERPL-MCNC: 0.9 MG/DL — SIGNIFICANT CHANGE UP (ref 0.2–1.2)
BUN SERPL-MCNC: 6 MG/DL — LOW (ref 7–23)
BUN SERPL-MCNC: 6 MG/DL — LOW (ref 7–23)
CALCIUM SERPL-MCNC: 7.9 MG/DL — LOW (ref 8.4–10.5)
CALCIUM SERPL-MCNC: 9 MG/DL — SIGNIFICANT CHANGE UP (ref 8.4–10.5)
CHLORIDE SERPL-SCNC: 100 MMOL/L — SIGNIFICANT CHANGE UP (ref 96–108)
CHLORIDE SERPL-SCNC: 94 MMOL/L — LOW (ref 96–108)
CO2 SERPL-SCNC: 23 MMOL/L — SIGNIFICANT CHANGE UP (ref 22–31)
CO2 SERPL-SCNC: 23 MMOL/L — SIGNIFICANT CHANGE UP (ref 22–31)
CREAT SERPL-MCNC: 0.71 MG/DL — SIGNIFICANT CHANGE UP (ref 0.5–1.3)
CREAT SERPL-MCNC: 0.74 MG/DL — SIGNIFICANT CHANGE UP (ref 0.5–1.3)
EOSINOPHIL # BLD AUTO: 0.01 K/UL — SIGNIFICANT CHANGE UP (ref 0–0.5)
EOSINOPHIL NFR BLD AUTO: 0.1 % — SIGNIFICANT CHANGE UP (ref 0–6)
ETHANOL SERPL-MCNC: 167 MG/DL — HIGH (ref 0–10)
GLUCOSE SERPL-MCNC: 65 MG/DL — LOW (ref 70–99)
GLUCOSE SERPL-MCNC: 70 MG/DL — SIGNIFICANT CHANGE UP (ref 70–99)
HCG SERPL-ACNC: <0 MIU/ML — SIGNIFICANT CHANGE UP
HCT VFR BLD CALC: 39.6 % — SIGNIFICANT CHANGE UP (ref 34.5–45)
HGB BLD-MCNC: 12.8 G/DL — SIGNIFICANT CHANGE UP (ref 11.5–15.5)
IMM GRANULOCYTES NFR BLD AUTO: 0.2 % — SIGNIFICANT CHANGE UP (ref 0–1.5)
LYMPHOCYTES # BLD AUTO: 2.63 K/UL — SIGNIFICANT CHANGE UP (ref 1–3.3)
LYMPHOCYTES # BLD AUTO: 26.2 % — SIGNIFICANT CHANGE UP (ref 13–44)
MAGNESIUM SERPL-MCNC: 1.9 MG/DL — SIGNIFICANT CHANGE UP (ref 1.6–2.6)
MCHC RBC-ENTMCNC: 31.1 PG — SIGNIFICANT CHANGE UP (ref 27–34)
MCHC RBC-ENTMCNC: 32.3 GM/DL — SIGNIFICANT CHANGE UP (ref 32–36)
MCV RBC AUTO: 96.4 FL — SIGNIFICANT CHANGE UP (ref 80–100)
MONOCYTES # BLD AUTO: 0.66 K/UL — SIGNIFICANT CHANGE UP (ref 0–0.9)
MONOCYTES NFR BLD AUTO: 6.6 % — SIGNIFICANT CHANGE UP (ref 2–14)
NEUTROPHILS # BLD AUTO: 6.64 K/UL — SIGNIFICANT CHANGE UP (ref 1.8–7.4)
NEUTROPHILS NFR BLD AUTO: 66 % — SIGNIFICANT CHANGE UP (ref 43–77)
NRBC # BLD: 0 /100 WBCS — SIGNIFICANT CHANGE UP (ref 0–0)
PLATELET # BLD AUTO: 298 K/UL — SIGNIFICANT CHANGE UP (ref 150–400)
POTASSIUM SERPL-MCNC: 3.5 MMOL/L — SIGNIFICANT CHANGE UP (ref 3.5–5.3)
POTASSIUM SERPL-MCNC: 3.6 MMOL/L — SIGNIFICANT CHANGE UP (ref 3.5–5.3)
POTASSIUM SERPL-SCNC: 3.5 MMOL/L — SIGNIFICANT CHANGE UP (ref 3.5–5.3)
POTASSIUM SERPL-SCNC: 3.6 MMOL/L — SIGNIFICANT CHANGE UP (ref 3.5–5.3)
PROT SERPL-MCNC: 8.8 G/DL — HIGH (ref 6–8.3)
RBC # BLD: 4.11 M/UL — SIGNIFICANT CHANGE UP (ref 3.8–5.2)
RBC # FLD: 17.1 % — HIGH (ref 10.3–14.5)
SODIUM SERPL-SCNC: 139 MMOL/L — SIGNIFICANT CHANGE UP (ref 135–145)
SODIUM SERPL-SCNC: 139 MMOL/L — SIGNIFICANT CHANGE UP (ref 135–145)
WBC # BLD: 10.05 K/UL — SIGNIFICANT CHANGE UP (ref 3.8–10.5)
WBC # FLD AUTO: 10.05 K/UL — SIGNIFICANT CHANGE UP (ref 3.8–10.5)

## 2021-11-04 PROCEDURE — 36415 COLL VENOUS BLD VENIPUNCTURE: CPT

## 2021-11-04 PROCEDURE — 96374 THER/PROPH/DIAG INJ IV PUSH: CPT

## 2021-11-04 PROCEDURE — 80053 COMPREHEN METABOLIC PANEL: CPT

## 2021-11-04 PROCEDURE — 80048 BASIC METABOLIC PNL TOTAL CA: CPT

## 2021-11-04 PROCEDURE — 85025 COMPLETE CBC W/AUTO DIFF WBC: CPT

## 2021-11-04 PROCEDURE — 99285 EMERGENCY DEPT VISIT HI MDM: CPT | Mod: 25

## 2021-11-04 PROCEDURE — 80307 DRUG TEST PRSMV CHEM ANLYZR: CPT

## 2021-11-04 PROCEDURE — 99284 EMERGENCY DEPT VISIT MOD MDM: CPT

## 2021-11-04 PROCEDURE — 83735 ASSAY OF MAGNESIUM: CPT

## 2021-11-04 PROCEDURE — 96375 TX/PRO/DX INJ NEW DRUG ADDON: CPT

## 2021-11-04 PROCEDURE — 84702 CHORIONIC GONADOTROPIN TEST: CPT

## 2021-11-04 RX ORDER — ONDANSETRON 8 MG/1
4 TABLET, FILM COATED ORAL ONCE
Refills: 0 | Status: COMPLETED | OUTPATIENT
Start: 2021-11-04 | End: 2021-11-04

## 2021-11-04 RX ORDER — SODIUM CHLORIDE 9 MG/ML
1000 INJECTION INTRAMUSCULAR; INTRAVENOUS; SUBCUTANEOUS ONCE
Refills: 0 | Status: COMPLETED | OUTPATIENT
Start: 2021-11-04 | End: 2021-11-04

## 2021-11-04 RX ADMIN — ONDANSETRON 4 MILLIGRAM(S): 8 TABLET, FILM COATED ORAL at 17:03

## 2021-11-04 RX ADMIN — SODIUM CHLORIDE 1000 MILLILITER(S): 9 INJECTION INTRAMUSCULAR; INTRAVENOUS; SUBCUTANEOUS at 17:03

## 2021-11-04 RX ADMIN — SODIUM CHLORIDE 1000 MILLILITER(S): 9 INJECTION INTRAMUSCULAR; INTRAVENOUS; SUBCUTANEOUS at 18:43

## 2021-11-04 RX ADMIN — Medication 1 MILLIGRAM(S): at 18:19

## 2021-11-04 NOTE — ED ADULT NURSE NOTE - OBJECTIVE STATEMENT
Pt via walk in triage reporting ETOH withdrawal, last drink 2000 yesterday. +tremulous, +nausea, denies VH/AH, denies HA, not diaphoretic. Pt w/ h/o ETOH abuse. Denies palpitations, denies CP, denies SOB.

## 2021-11-04 NOTE — ED ADULT NURSE REASSESSMENT NOTE - NS ED NURSE REASSESS COMMENT FT1
Pt reports reduction in nausea, resting calmly in bed. Awaiting further MD orders. Will continue to monitor and assess.

## 2021-11-04 NOTE — ED PROVIDER NOTE - MDM PATIENT STATEMENT FOR ADDL TREATMENT
respiratory failure Hypercapnic and hypoxic respiratory failure respiratory failure respiratory failure respiratory failure respiratory failure respiratory failure respiratory failure respiratory failure Patient with one or more new problems requiring additional work-up/treatment.

## 2021-11-04 NOTE — ED PROVIDER NOTE - OBJECTIVE STATEMENT
history of alcohol abuse, hypothyroidism, here with reported alcohol withdrawal symptoms. Reports she has been drinking a pint of meenu daily for past 3 weeks. Last drink last night 8pm. Since this am feeling shaky, weak, nausea, dehydrated. No fever, chills, vomiting, diarrhea. No prior seizure/ dt's. Says she doesn't want to be admitted today. Denies drug use, trauma.

## 2021-11-04 NOTE — ED ADULT TRIAGE NOTE - CHIEF COMPLAINT QUOTE
Pt presents to ED c/o alcohol withdrawal. Last drink 8pm last night. Reporting palpitations, chest tightness, anxiety, nausea. Denies SI/HI, hallucinations. 12 lead EKG done.

## 2021-11-04 NOTE — ED PROVIDER NOTE - NEUROLOGICAL, MLM
Alert and oriented, no focal deficits, no motor or sensory deficits. + tremor of hands when extended

## 2021-11-04 NOTE — ED PROVIDER NOTE - PATIENT PORTAL LINK FT
You can access the FollowMyHealth Patient Portal offered by [facility] by registering at the following website: [email or web address]. By joining [employer]’s FollowMyHealth portal, you will also be able to view your health information using other applications (apps) compatible with our system.

## 2021-11-04 NOTE — ED PROVIDER NOTE - CLINICAL SUMMARY MEDICAL DECISION MAKING FREE TEXT BOX
alcohol abuse with reported withdrawal symptoms of tremor/anxiety/nausea. reports no intake in past 20 hrs. given ivf 2L/ zofran/ ativan 1mg iv for symptoms. labs done however and alcohol level 167. also anion gap without acidosis. likely related to alcohol/ dehydration. repeated after ivf and normalized. counseled to avoid alcohol, detox referral given. return precautions discussed

## 2021-11-17 ENCOUNTER — EMERGENCY (EMERGENCY)
Facility: HOSPITAL | Age: 31
LOS: 1 days | Discharge: HOME | End: 2021-11-17
Attending: EMERGENCY MEDICINE | Admitting: EMERGENCY MEDICINE
Payer: COMMERCIAL

## 2021-11-17 VITALS
TEMPERATURE: 98 F | SYSTOLIC BLOOD PRESSURE: 127 MMHG | RESPIRATION RATE: 18 BRPM | DIASTOLIC BLOOD PRESSURE: 81 MMHG | HEART RATE: 100 BPM | OXYGEN SATURATION: 97 %

## 2021-11-17 VITALS
DIASTOLIC BLOOD PRESSURE: 73 MMHG | HEIGHT: 62 IN | WEIGHT: 136.03 LBS | TEMPERATURE: 98 F | RESPIRATION RATE: 18 BRPM | OXYGEN SATURATION: 97 % | SYSTOLIC BLOOD PRESSURE: 111 MMHG | HEART RATE: 90 BPM

## 2021-11-17 DIAGNOSIS — R41.82 ALTERED MENTAL STATUS, UNSPECIFIED: ICD-10-CM

## 2021-11-17 DIAGNOSIS — F10.129 ALCOHOL ABUSE WITH INTOXICATION, UNSPECIFIED: ICD-10-CM

## 2021-11-17 DIAGNOSIS — E03.9 HYPOTHYROIDISM, UNSPECIFIED: ICD-10-CM

## 2021-11-17 DIAGNOSIS — Z20.822 CONTACT WITH AND (SUSPECTED) EXPOSURE TO COVID-19: ICD-10-CM

## 2021-11-17 DIAGNOSIS — R11.0 NAUSEA: ICD-10-CM

## 2021-11-17 DIAGNOSIS — J45.909 UNSPECIFIED ASTHMA, UNCOMPLICATED: ICD-10-CM
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
## 2021-11-17 DIAGNOSIS — Z91.013 ALLERGY TO SEAFOOD: ICD-10-CM

## 2021-11-17 LAB
ALBUMIN SERPL ELPH-MCNC: 4.8 G/DL — SIGNIFICANT CHANGE UP (ref 3.3–5)
ALP SERPL-CCNC: 80 U/L — SIGNIFICANT CHANGE UP (ref 40–120)
ALT FLD-CCNC: 199 U/L — HIGH (ref 10–45)
ANION GAP SERPL CALC-SCNC: 15 MMOL/L — SIGNIFICANT CHANGE UP (ref 5–17)
APPEARANCE UR: CLEAR — SIGNIFICANT CHANGE UP
AST SERPL-CCNC: 275 U/L — HIGH (ref 10–40)
BASOPHILS # BLD AUTO: 0.12 K/UL — SIGNIFICANT CHANGE UP (ref 0–0.2)
BASOPHILS NFR BLD AUTO: 1.4 % — SIGNIFICANT CHANGE UP (ref 0–2)
BILIRUB SERPL-MCNC: 0.5 MG/DL — SIGNIFICANT CHANGE UP (ref 0.2–1.2)
BILIRUB UR-MCNC: NEGATIVE — SIGNIFICANT CHANGE UP
BUN SERPL-MCNC: 10 MG/DL — SIGNIFICANT CHANGE UP (ref 7–23)
CALCIUM SERPL-MCNC: 8.6 MG/DL — SIGNIFICANT CHANGE UP (ref 8.4–10.5)
CHLORIDE SERPL-SCNC: 102 MMOL/L — SIGNIFICANT CHANGE UP (ref 96–108)
CO2 SERPL-SCNC: 28 MMOL/L — SIGNIFICANT CHANGE UP (ref 22–31)
COLOR SPEC: YELLOW — SIGNIFICANT CHANGE UP
CREAT SERPL-MCNC: 0.82 MG/DL — SIGNIFICANT CHANGE UP (ref 0.5–1.3)
DIFF PNL FLD: NEGATIVE — SIGNIFICANT CHANGE UP
EOSINOPHIL # BLD AUTO: 0.1 K/UL — SIGNIFICANT CHANGE UP (ref 0–0.5)
EOSINOPHIL NFR BLD AUTO: 1.2 % — SIGNIFICANT CHANGE UP (ref 0–6)
ETHANOL SERPL-MCNC: 448 MG/DL — HIGH (ref 0–10)
GLUCOSE SERPL-MCNC: 105 MG/DL — HIGH (ref 70–99)
GLUCOSE UR QL: NEGATIVE — SIGNIFICANT CHANGE UP
HCG SERPL-ACNC: <0 MIU/ML — SIGNIFICANT CHANGE UP
HCT VFR BLD CALC: 42.8 % — SIGNIFICANT CHANGE UP (ref 34.5–45)
HGB BLD-MCNC: 13.1 G/DL — SIGNIFICANT CHANGE UP (ref 11.5–15.5)
HIV 1+2 AB+HIV1 P24 AG SERPL QL IA: SIGNIFICANT CHANGE UP
IMM GRANULOCYTES NFR BLD AUTO: 0.2 % — SIGNIFICANT CHANGE UP (ref 0–1.5)
KETONES UR-MCNC: NEGATIVE — SIGNIFICANT CHANGE UP
LEUKOCYTE ESTERASE UR-ACNC: NEGATIVE — SIGNIFICANT CHANGE UP
LYMPHOCYTES # BLD AUTO: 2.92 K/UL — SIGNIFICANT CHANGE UP (ref 1–3.3)
LYMPHOCYTES # BLD AUTO: 34.5 % — SIGNIFICANT CHANGE UP (ref 13–44)
MAGNESIUM SERPL-MCNC: 2.2 MG/DL — SIGNIFICANT CHANGE UP (ref 1.6–2.6)
MCHC RBC-ENTMCNC: 29.9 PG — SIGNIFICANT CHANGE UP (ref 27–34)
MCHC RBC-ENTMCNC: 30.6 GM/DL — LOW (ref 32–36)
MCV RBC AUTO: 97.7 FL — SIGNIFICANT CHANGE UP (ref 80–100)
MONOCYTES # BLD AUTO: 0.38 K/UL — SIGNIFICANT CHANGE UP (ref 0–0.9)
MONOCYTES NFR BLD AUTO: 4.5 % — SIGNIFICANT CHANGE UP (ref 2–14)
NEUTROPHILS # BLD AUTO: 4.92 K/UL — SIGNIFICANT CHANGE UP (ref 1.8–7.4)
NEUTROPHILS NFR BLD AUTO: 58.2 % — SIGNIFICANT CHANGE UP (ref 43–77)
NITRITE UR-MCNC: NEGATIVE — SIGNIFICANT CHANGE UP
NRBC # BLD: 0 /100 WBCS — SIGNIFICANT CHANGE UP (ref 0–0)
PH UR: 6 — SIGNIFICANT CHANGE UP (ref 5–8)
PLATELET # BLD AUTO: 308 K/UL — SIGNIFICANT CHANGE UP (ref 150–400)
POTASSIUM SERPL-MCNC: 3.6 MMOL/L — SIGNIFICANT CHANGE UP (ref 3.5–5.3)
POTASSIUM SERPL-SCNC: 3.6 MMOL/L — SIGNIFICANT CHANGE UP (ref 3.5–5.3)
PROT SERPL-MCNC: 8.8 G/DL — HIGH (ref 6–8.3)
PROT UR-MCNC: NEGATIVE MG/DL — SIGNIFICANT CHANGE UP
RBC # BLD: 4.38 M/UL — SIGNIFICANT CHANGE UP (ref 3.8–5.2)
RBC # FLD: 17.7 % — HIGH (ref 10.3–14.5)
SARS-COV-2 RNA SPEC QL NAA+PROBE: NEGATIVE — SIGNIFICANT CHANGE UP
SODIUM SERPL-SCNC: 145 MMOL/L — SIGNIFICANT CHANGE UP (ref 135–145)
SP GR SPEC: 1.01 — SIGNIFICANT CHANGE UP (ref 1–1.03)
UROBILINOGEN FLD QL: 0.2 E.U./DL — SIGNIFICANT CHANGE UP
WBC # BLD: 8.46 K/UL — SIGNIFICANT CHANGE UP (ref 3.8–10.5)
WBC # FLD AUTO: 8.46 K/UL — SIGNIFICANT CHANGE UP (ref 3.8–10.5)

## 2021-11-17 PROCEDURE — 87389 HIV-1 AG W/HIV-1&-2 AB AG IA: CPT

## 2021-11-17 PROCEDURE — 85025 COMPLETE CBC W/AUTO DIFF WBC: CPT

## 2021-11-17 PROCEDURE — 99284 EMERGENCY DEPT VISIT MOD MDM: CPT

## 2021-11-17 PROCEDURE — 99285 EMERGENCY DEPT VISIT HI MDM: CPT | Mod: 25

## 2021-11-17 PROCEDURE — 93010 ELECTROCARDIOGRAM REPORT: CPT

## 2021-11-17 PROCEDURE — 87086 URINE CULTURE/COLONY COUNT: CPT

## 2021-11-17 PROCEDURE — 87635 SARS-COV-2 COVID-19 AMP PRB: CPT

## 2021-11-17 PROCEDURE — 96374 THER/PROPH/DIAG INJ IV PUSH: CPT

## 2021-11-17 PROCEDURE — 81003 URINALYSIS AUTO W/O SCOPE: CPT

## 2021-11-17 PROCEDURE — 87491 CHLMYD TRACH DNA AMP PROBE: CPT

## 2021-11-17 PROCEDURE — 84702 CHORIONIC GONADOTROPIN TEST: CPT

## 2021-11-17 PROCEDURE — 96375 TX/PRO/DX INJ NEW DRUG ADDON: CPT

## 2021-11-17 PROCEDURE — 80307 DRUG TEST PRSMV CHEM ANLYZR: CPT

## 2021-11-17 PROCEDURE — 87591 N.GONORRHOEAE DNA AMP PROB: CPT

## 2021-11-17 PROCEDURE — 82962 GLUCOSE BLOOD TEST: CPT

## 2021-11-17 PROCEDURE — 83735 ASSAY OF MAGNESIUM: CPT

## 2021-11-17 PROCEDURE — 36415 COLL VENOUS BLD VENIPUNCTURE: CPT

## 2021-11-17 PROCEDURE — 93005 ELECTROCARDIOGRAM TRACING: CPT

## 2021-11-17 PROCEDURE — 80053 COMPREHEN METABOLIC PANEL: CPT

## 2021-11-17 RX ORDER — SODIUM CHLORIDE 9 MG/ML
1000 INJECTION INTRAMUSCULAR; INTRAVENOUS; SUBCUTANEOUS ONCE
Refills: 0 | Status: COMPLETED | OUTPATIENT
Start: 2021-11-17 | End: 2021-11-17

## 2021-11-17 RX ORDER — ONDANSETRON 8 MG/1
4 TABLET, FILM COATED ORAL ONCE
Refills: 0 | Status: COMPLETED | OUTPATIENT
Start: 2021-11-17 | End: 2021-11-17

## 2021-11-17 RX ADMIN — SODIUM CHLORIDE 2000 MILLILITER(S): 9 INJECTION INTRAMUSCULAR; INTRAVENOUS; SUBCUTANEOUS at 11:35

## 2021-11-17 RX ADMIN — Medication 1 MILLIGRAM(S): at 11:35

## 2021-11-17 RX ADMIN — ONDANSETRON 4 MILLIGRAM(S): 8 TABLET, FILM COATED ORAL at 11:35

## 2021-11-17 NOTE — ED PROVIDER NOTE - GASTROINTESTINAL, MLM
Abdomen soft, non-tender. Actively retching into pall w/o production of emesis. Abdomen soft, non-tender. Actively retching into pail w/o production of emesis.

## 2021-11-17 NOTE — ED PROVIDER NOTE - PATIENT PORTAL LINK FT
You can access the FollowMyHealth Patient Portal offered by [facility] by registering at the following website: [email or web address]. By joining [employer]’s FollowMyHealth portal, you will also be able to view your health information using other applications (apps) compatible with our system.

## 2021-11-17 NOTE — ED ADULT NURSE NOTE - OBJECTIVE STATEMENT
pt is 31y female, her for intoxication and possible ETOH withdrawal, pt seen in ED 2 weeks ago for same and discharged home, reports a pint of hard liquor every day, last alcohol intake at 9 am today, denies any drug use, pt is awake and A&Ox3, VSS, no noticeable tremor, pt self reports headache and nausea, denies any A/V hallucinations, appearing anxious but in NAD, no neuro deficits observed

## 2021-11-17 NOTE — ED PROVIDER NOTE - NSICDXPASTMEDICALHX_GEN_ALL_CORE_FT
PAST MEDICAL HISTORY:  Alcohol abuse     Asthma     Hypothyroid      PAST MEDICAL HISTORY:  Alcohol abuse     Asthma     Hypothyroid

## 2021-11-17 NOTE — ED PROVIDER NOTE - CONSTITUTIONAL, MLM
normal... Anxious appearing. Actively retching into pall w/o production of emesis. Not tremulous. Anxious appearing. Actively retching into pail w/o production of emesis. Not tremulous.

## 2021-11-17 NOTE — ED PROVIDER NOTE - OBJECTIVE STATEMENT
30 y/o F w/ Hx of alcohol abuse (has been in alcohol withdrawal before but denies seizures or ICU stays for withdrawal, been in detox and rehab, last time in detox 6 months ago), presents today stating "I am withdrawing." Pt drinks about 1 pint of [place] whiskey everyday and has been drinking for the past few weeks ever since discharge from detox in June 2021. States that last drink of whiskey 30 mins prior to arrival. However, stating that she is feeling shaky, anxious, and has been having nausea w/ dry retching and unable to tolerate PO. Pt denies CP, SOB, or headache. Also denies taking med for symptoms. Pt presents w/ mom at bedside who collaborates that pt drank 30 mins prior to arrival to ED. Fully vaccinated for COVID10 and denies fever, chills. Pt also stating that she is noting foul odor when she urinates, consistent w/ prior bacterial vaginosis infection. However, denies vaginal discharge. States that she wants to be treated for bacterial vaginosis. pt is sexually active and wants to be tested for STIs including HIV. Denies urinary frequency, burning w/ urination or dysuria. LMP 1 week ago. Denies pregnancy. No abdominal pain or flank pain. 32 y/o F w/ Hx of alcohol abuse (has been in alcohol withdrawal before but denies seizures or ICU stays for withdrawal, been in detox and rehab (last time in detox 6 months ago), presents today stating "I am withdrawing." Pt drinks about 1 pint of [place] whiskey everyday and has been drinking for the past few weeks ever since discharge from detox in June 2021. States that last drink of whiskey 30 mins prior to arrival. However, stating that she is feeling shaky, anxious, and has been having nausea w/ dry retching and unable to tolerate PO. Pt denies CP, SOB, or headache. Also denies taking medications for symptoms. Pt presents w/ mom at bedside who collaborates that pt drank 30 mins prior to arrival to ED. Fully vaccinated for COVID10 and denies fever, chills. Pt also stating that she is noting foul odor when she urinates, consistent w/ prior bacterial vaginosis infection. However, denies vaginal discharge. States that she wants to be treated for bacterial vaginosis. pt is sexually active and wants to be tested for STIs including HIV. Denies urinary frequency, burning w/ urination or dysuria. LMP 1 week ago. Denies pregnancy. No abdominal pain or flank pain.

## 2021-11-17 NOTE — ED PROVIDER NOTE - CLINICAL SUMMARY MEDICAL DECISION MAKING FREE TEXT BOX
30 y/o F w/ Hx of alcohol abuse presents for feeling shaky, anxious, nausea w/ dry retching, and unable to tolerate PO. Last drink 30 mins prior to arrival at ED. Denies CP, SOB, headache. Pt also complaining of foul odor from urine, consistent w/ bacterial vaginosis. Denies urinary frequency, burning w/ urination, dysuria, or vaginal discharge. Plan for blood work including alcohol level, EKG, IV fluids, Ativan 1mg IV for anxiety, and 4mg IV Zofran for nausea. Check labs and re-eval. 32 y/o F w/ Hx of alcohol abuse presents for feeling shaky, anxious, nausea w/ dry retching, and unable to tolerate PO. Last drink 30 mins prior to arrival at ED. Denies CP, SOB, headache. Pt also complaining of foul odor from urine, consistent w/ bacterial vaginosis. Denies urinary frequency, burning w/ urination, dysuria, or vaginal discharge. Plan for blood work including alcohol level, EKG, IV fluids, Ativan 1mg IV for anxiety, and 4mg IV Zofran for nausea. Check labs and re-eval.    ED course: VSS. labs/ studies noted.  + and rest of labs WNL. Pt feeling better post IVF and meds. Observed until clinically sober. Pt is not withdrawing. To f/up outpt. Return precautions given. 30 y/o F w/ Hx of alcohol abuse presents for feeling shaky, anxious, nausea w/ dry retching, and unable to tolerate PO. Last drink 30 mins prior to arrival at ED. Denies CP, SOB, headache. Pt also complaining of foul odor from urine, consistent w/ bacterial vaginosis. Denies urinary frequency, burning w/ urination, dysuria, or vaginal discharge. Plan for blood work including alcohol level, EKG, IV fluids, Ativan 1mg IV for anxiety, and 4mg IV Zofran for nausea. Check labs and re-eval.    ED course: VSS. labs/ studies noted.  + and rest of labs WNL ( except for LFTs mildly elevated). Pt feeling better post IVF and meds. Observed until clinically sober. Pt is not withdrawing. To f/up outpt. Return precautions given. 32 y/o F w/ Hx of alcohol abuse presents for feeling shaky, anxious, nausea w/ dry retching, and unable to tolerate PO. Last drink 30 mins prior to arrival at ED. Denies CP, SOB, headache. Pt also complaining of foul odor from urine, consistent w/ bacterial vaginosis. Denies urinary frequency, burning w/ urination, dysuria, or vaginal discharge. Plan for blood work including alcohol level, EKG, IV fluids, Ativan 1mg IV for anxiety, and 4mg IV Zofran for nausea. Check labs and re-eval.    ED course: VSS. labs/ studies noted.  + and rest of labs WNL ( except for LFTs mildly elevated). Urine hcg and UA negative. ECG WNL. Pt feeling better post IVF and meds. Observed until clinically sober. Pt is not withdrawing. To f/up outpt. Return precautions given.

## 2021-11-17 NOTE — ED ADULT NURSE NOTE - CHPI ED NUR SYMPTOMS NEG
no blurred vision/no change in level of consciousness/no confusion/no dizziness/no fever/no loss of consciousness/no numbness/no vomiting/no weakness

## 2021-11-17 NOTE — ED ADULT TRIAGE NOTE - CHIEF COMPLAINT QUOTE
Pt brought in by mom for intox. Pt reports Last drank whiskey 30 mins PTA. Pt talking in clear, full sentences, respirations even and unlabored.

## 2021-11-17 NOTE — ED ADULT NURSE NOTE - CCCP TRG CHIEF CMPLNT
Is This A New Presentation, Or A Follow-Up?: Skin Lesion Has Your Skin Lesion Been Treated?: not been treated altered mental status

## 2021-11-17 NOTE — ED PROVIDER NOTE - INTERPRETATION
no acute ST-T changes/normal sinus rhythm, Normal axis, Normal AK interval and QRS complex. There are no acute ischemic ST or T-wave changes.

## 2021-11-18 ENCOUNTER — HOSPITAL ENCOUNTER (INPATIENT)
Dept: HOSPITAL 74 - YASAS | Age: 31
LOS: 4 days | Discharge: HOME | End: 2021-11-22
Attending: ALLERGY & IMMUNOLOGY | Admitting: ALLERGY & IMMUNOLOGY
Payer: COMMERCIAL

## 2021-11-18 VITALS — BODY MASS INDEX: 25.2 KG/M2

## 2021-11-18 DIAGNOSIS — R74.01: ICD-10-CM

## 2021-11-18 DIAGNOSIS — F17.210: ICD-10-CM

## 2021-11-18 DIAGNOSIS — F10.230: Primary | ICD-10-CM

## 2021-11-18 DIAGNOSIS — F41.9: ICD-10-CM

## 2021-11-18 DIAGNOSIS — E83.51: ICD-10-CM

## 2021-11-18 DIAGNOSIS — E89.0: ICD-10-CM

## 2021-11-18 DIAGNOSIS — F31.9: ICD-10-CM

## 2021-11-18 DIAGNOSIS — F10.24: ICD-10-CM

## 2021-11-18 LAB
ALBUMIN SERPL-MCNC: 3.9 G/DL (ref 3.4–5)
ALP SERPL-CCNC: 78 U/L (ref 45–117)
ALT SERPL-CCNC: 174 U/L (ref 13–61)
ANION GAP SERPL CALC-SCNC: 11 MMOL/L (ref 8–16)
AST SERPL-CCNC: 188 U/L (ref 15–37)
BILIRUB SERPL-MCNC: 0.5 MG/DL (ref 0.2–1)
BUN SERPL-MCNC: 9.2 MG/DL (ref 7–18)
C TRACH RRNA SPEC QL NAA+PROBE: SIGNIFICANT CHANGE UP
CALCIUM SERPL-MCNC: 7.8 MG/DL (ref 8.5–10.1)
CHLORIDE SERPL-SCNC: 102 MMOL/L (ref 98–107)
CO2 SERPL-SCNC: 28 MMOL/L (ref 21–32)
CREAT SERPL-MCNC: 0.9 MG/DL (ref 0.55–1.3)
CULTURE RESULTS: SIGNIFICANT CHANGE UP
DEPRECATED RDW RBC AUTO: 18.1 % (ref 11.6–15.6)
GLUCOSE SERPL-MCNC: 82 MG/DL (ref 74–106)
HCT VFR BLD CALC: 36.4 % (ref 32.4–45.2)
HGB BLD-MCNC: 12.1 GM/DL (ref 10.7–15.3)
MCH RBC QN AUTO: 31.1 PG (ref 25.7–33.7)
MCHC RBC AUTO-ENTMCNC: 33.3 G/DL (ref 32–36)
MCV RBC: 93.4 FL (ref 80–96)
N GONORRHOEA RRNA SPEC QL NAA+PROBE: SIGNIFICANT CHANGE UP
PLATELET # BLD AUTO: 278 10^3/UL (ref 134–434)
PMV BLD: 7.3 FL (ref 7.5–11.1)
PROT SERPL-MCNC: 8.1 G/DL (ref 6.4–8.2)
RBC # BLD AUTO: 3.9 M/MM3 (ref 3.6–5.2)
SODIUM SERPL-SCNC: 140 MMOL/L (ref 136–145)
SPECIMEN SOURCE: SIGNIFICANT CHANGE UP
WBC # BLD AUTO: 8.4 K/MM3 (ref 4–10)

## 2021-11-18 PROCEDURE — U0003 INFECTIOUS AGENT DETECTION BY NUCLEIC ACID (DNA OR RNA); SEVERE ACUTE RESPIRATORY SYNDROME CORONAVIRUS 2 (SARS-COV-2) (CORONAVIRUS DISEASE [COVID-19]), AMPLIFIED PROBE TECHNIQUE, MAKING USE OF HIGH THROUGHPUT TECHNOLOGIES AS DESCRIBED BY CMS-2020-01-R: HCPCS

## 2021-11-18 PROCEDURE — C9803 HOPD COVID-19 SPEC COLLECT: HCPCS

## 2021-11-18 PROCEDURE — HZ2ZZZZ DETOXIFICATION SERVICES FOR SUBSTANCE ABUSE TREATMENT: ICD-10-PCS | Performed by: ALLERGY & IMMUNOLOGY

## 2021-11-18 PROCEDURE — U0005 INFEC AGEN DETEC AMPLI PROBE: HCPCS

## 2021-11-18 RX ADMIN — HYDROXYZINE PAMOATE SCH MG: 25 CAPSULE ORAL at 17:45

## 2021-11-18 RX ADMIN — Medication SCH MG: at 22:13

## 2021-11-18 RX ADMIN — HYDROXYZINE PAMOATE SCH MG: 25 CAPSULE ORAL at 22:13

## 2021-11-18 RX ADMIN — ACETAMINOPHEN PRN MG: 325 TABLET ORAL at 22:13

## 2021-11-18 RX ADMIN — HYDROXYZINE PAMOATE SCH MG: 25 CAPSULE ORAL at 15:00

## 2021-11-18 RX ADMIN — METHOCARBAMOL PRN MG: 500 TABLET ORAL at 15:01

## 2021-11-18 RX ADMIN — ALBUTEROL SULFATE PRN PUFF: 90 AEROSOL, METERED RESPIRATORY (INHALATION) at 17:45

## 2021-11-19 RX ADMIN — HYDROXYZINE PAMOATE PRN MG: 25 CAPSULE ORAL at 11:35

## 2021-11-19 RX ADMIN — ACETAMINOPHEN PRN MG: 325 TABLET ORAL at 18:09

## 2021-11-19 RX ADMIN — HYDROXYZINE PAMOATE PRN MG: 25 CAPSULE ORAL at 18:08

## 2021-11-19 RX ADMIN — HYDROXYZINE PAMOATE SCH MG: 25 CAPSULE ORAL at 05:39

## 2021-11-19 RX ADMIN — METHOCARBAMOL PRN MG: 500 TABLET ORAL at 22:28

## 2021-11-19 RX ADMIN — HYDROXYZINE PAMOATE SCH MG: 25 CAPSULE ORAL at 10:35

## 2021-11-19 RX ADMIN — Medication SCH MG: at 22:28

## 2021-11-19 RX ADMIN — Medication SCH TAB: at 10:34

## 2021-11-19 RX ADMIN — ACETAMINOPHEN PRN MG: 325 TABLET ORAL at 10:35

## 2021-11-19 RX ADMIN — Medication SCH MG: at 22:27

## 2021-11-19 RX ADMIN — HYDROXYZINE PAMOATE PRN MG: 25 CAPSULE ORAL at 22:27

## 2021-11-20 RX ADMIN — ALUMINUM HYDROXIDE, MAGNESIUM HYDROXIDE, AND SIMETHICONE PRN ML: 200; 200; 20 SUSPENSION ORAL at 22:41

## 2021-11-20 RX ADMIN — LEVOTHYROXINE SODIUM SCH MCG: 100 TABLET ORAL at 07:04

## 2021-11-20 RX ADMIN — Medication SCH MG: at 22:10

## 2021-11-20 RX ADMIN — HYDROXYZINE PAMOATE PRN MG: 25 CAPSULE ORAL at 22:41

## 2021-11-20 RX ADMIN — ALUMINUM HYDROXIDE, MAGNESIUM HYDROXIDE, AND SIMETHICONE PRN ML: 200; 200; 20 SUSPENSION ORAL at 10:38

## 2021-11-20 RX ADMIN — IBUPROFEN PRN MG: 400 TABLET, FILM COATED ORAL at 22:12

## 2021-11-20 RX ADMIN — Medication SCH TAB: at 10:38

## 2021-11-20 RX ADMIN — METHOCARBAMOL PRN MG: 500 TABLET ORAL at 17:16

## 2021-11-21 RX ADMIN — ACETAMINOPHEN PRN MG: 325 TABLET ORAL at 17:13

## 2021-11-21 RX ADMIN — Medication SCH MG: at 22:09

## 2021-11-21 RX ADMIN — ALBUTEROL SULFATE PRN PUFF: 90 AEROSOL, METERED RESPIRATORY (INHALATION) at 17:13

## 2021-11-21 RX ADMIN — HYDROXYZINE PAMOATE PRN MG: 25 CAPSULE ORAL at 22:11

## 2021-11-21 RX ADMIN — LEVOTHYROXINE SODIUM SCH MCG: 100 TABLET ORAL at 06:26

## 2021-11-21 RX ADMIN — IBUPROFEN PRN MG: 400 TABLET, FILM COATED ORAL at 22:10

## 2021-11-21 RX ADMIN — Medication SCH TAB: at 10:25

## 2021-11-21 RX ADMIN — METHOCARBAMOL PRN MG: 500 TABLET ORAL at 10:27

## 2021-11-22 VITALS — HEART RATE: 89 BPM | TEMPERATURE: 97.9 F

## 2021-11-22 VITALS — DIASTOLIC BLOOD PRESSURE: 76 MMHG | SYSTOLIC BLOOD PRESSURE: 111 MMHG

## 2021-11-22 LAB
ALT SERPL-CCNC: 105 U/L (ref 13–61)
AST SERPL-CCNC: 119 U/L (ref 15–37)

## 2021-11-22 RX ADMIN — LEVOTHYROXINE SODIUM SCH MCG: 100 TABLET ORAL at 06:06

## 2021-11-24 ENCOUNTER — NON-APPOINTMENT (OUTPATIENT)
Age: 31
End: 2021-11-24

## 2021-11-25 PROBLEM — F10.10 ALCOHOL ABUSE, UNCOMPLICATED: Chronic | Status: ACTIVE | Noted: 2021-11-17

## 2021-12-10 NOTE — ED PROVIDER NOTE - NS ED MD DISPO DISCHARGE CCDA
A [employer] page was sent to Sergio Delong.  The page included the following message:    [phone] [facility]OL[facility]  
Patient/Caregiver provided printed discharge information.

## 2021-12-11 ENCOUNTER — EMERGENCY (EMERGENCY)
Facility: HOSPITAL | Age: 31
LOS: 1 days | Discharge: HOME | End: 2021-12-11
Attending: EMERGENCY MEDICINE | Admitting: EMERGENCY MEDICINE
Payer: COMMERCIAL

## 2021-12-11 VITALS
SYSTOLIC BLOOD PRESSURE: 121 MMHG | DIASTOLIC BLOOD PRESSURE: 83 MMHG | OXYGEN SATURATION: 97 % | RESPIRATION RATE: 16 BRPM | TEMPERATURE: 98 F | HEART RATE: 70 BPM

## 2021-12-11 VITALS
TEMPERATURE: 100 F | DIASTOLIC BLOOD PRESSURE: 101 MMHG | SYSTOLIC BLOOD PRESSURE: 141 MMHG | HEART RATE: 101 BPM | HEIGHT: 62 IN | RESPIRATION RATE: 18 BRPM | WEIGHT: 125 LBS | OXYGEN SATURATION: 99 %

## 2021-12-11 DIAGNOSIS — E03.9 HYPOTHYROIDISM, UNSPECIFIED: ICD-10-CM

## 2021-12-11 DIAGNOSIS — Z87.891 PERSONAL HISTORY OF NICOTINE DEPENDENCE: ICD-10-CM

## 2021-12-11 DIAGNOSIS — R09.81 NASAL CONGESTION: ICD-10-CM

## 2021-12-11 DIAGNOSIS — R00.0 TACHYCARDIA, UNSPECIFIED: ICD-10-CM

## 2021-12-11 DIAGNOSIS — I10 ESSENTIAL (PRIMARY) HYPERTENSION: ICD-10-CM

## 2021-12-11 DIAGNOSIS — F10.239 ALCOHOL DEPENDENCE WITH WITHDRAWAL, UNSPECIFIED: ICD-10-CM

## 2021-12-11 DIAGNOSIS — Z91.013 ALLERGY TO SEAFOOD: ICD-10-CM

## 2021-12-11 DIAGNOSIS — F10.10 ALCOHOL ABUSE, UNCOMPLICATED: ICD-10-CM
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
## 2021-12-11 DIAGNOSIS — J45.909 UNSPECIFIED ASTHMA, UNCOMPLICATED: ICD-10-CM

## 2021-12-11 PROCEDURE — 80053 COMPREHEN METABOLIC PANEL: CPT

## 2021-12-11 PROCEDURE — 93005 ELECTROCARDIOGRAM TRACING: CPT

## 2021-12-11 PROCEDURE — 96374 THER/PROPH/DIAG INJ IV PUSH: CPT

## 2021-12-11 PROCEDURE — 96375 TX/PRO/DX INJ NEW DRUG ADDON: CPT

## 2021-12-11 PROCEDURE — 87635 SARS-COV-2 COVID-19 AMP PRB: CPT

## 2021-12-11 PROCEDURE — 85025 COMPLETE CBC W/AUTO DIFF WBC: CPT

## 2021-12-11 PROCEDURE — 82962 GLUCOSE BLOOD TEST: CPT

## 2021-12-11 PROCEDURE — 99284 EMERGENCY DEPT VISIT MOD MDM: CPT | Mod: 25

## 2021-12-11 PROCEDURE — 36415 COLL VENOUS BLD VENIPUNCTURE: CPT

## 2021-12-11 PROCEDURE — 80307 DRUG TEST PRSMV CHEM ANLYZR: CPT

## 2021-12-11 PROCEDURE — 93010 ELECTROCARDIOGRAM REPORT: CPT

## 2021-12-11 RX ORDER — FLUTICASONE PROPIONATE 50 MCG
1 SPRAY, SUSPENSION NASAL
Qty: 1 | Refills: 0
Start: 2021-12-11

## 2021-12-11 RX ORDER — ONDANSETRON 8 MG/1
4 TABLET, FILM COATED ORAL ONCE
Refills: 0 | Status: COMPLETED | OUTPATIENT
Start: 2021-12-11 | End: 2021-12-11

## 2021-12-11 RX ORDER — SODIUM CHLORIDE 9 MG/ML
1000 INJECTION INTRAMUSCULAR; INTRAVENOUS; SUBCUTANEOUS ONCE
Refills: 0 | Status: COMPLETED | OUTPATIENT
Start: 2021-12-11 | End: 2021-12-11

## 2021-12-11 RX ADMIN — Medication 1 MILLIGRAM(S): at 19:46

## 2021-12-11 RX ADMIN — SODIUM CHLORIDE 1000 MILLILITER(S): 9 INJECTION INTRAMUSCULAR; INTRAVENOUS; SUBCUTANEOUS at 19:46

## 2021-12-11 RX ADMIN — ONDANSETRON 4 MILLIGRAM(S): 8 TABLET, FILM COATED ORAL at 19:46

## 2021-12-11 NOTE — ED PROVIDER NOTE - PHYSICAL EXAMINATION
Vitals reviewed  Gen: anxious appearing but nad, speaking in full sentences, mild tremor w/ arms extended only   Skin: wwp, no rash/lesions, no diaphoresis   HEENT: ncat, eomi, slightly dry oral mucosa, no tongue fasciculation   CV: tachy, regular rhythm, no audible m/r/g  Resp: symmetrical expansion, ctab, no w/r/r  Abd: nondistended, soft, nontender, no rebound/guarding, no cvat   Ext: FROM throughout, no peripheral edema  Neuro: alert/oriented, no focal deficits, steady gait

## 2021-12-11 NOTE — ED PROVIDER NOTE - ATTENDING CONTRIBUTION TO CARE
31 F ho asthma/hypothyroid, etoh abuse- no ho w/d sz's- 1 week of sinus congestion  feels like she is w/d from etoh  had drink at 4 pm- had 1/2 pt vodka  vss  s1s2 lungs cta bl  abd soft nt nd +bs  ext no c/c/e  IMP- N/V  mild tremor  no sig w/d

## 2021-12-11 NOTE — ED ADULT TRIAGE NOTE - CHIEF COMPLAINT QUOTE
" I have been drinking alcohol for the past week and I don't feel well and I am withdrawing and I have a sinus infection".

## 2021-12-11 NOTE — ED ADULT NURSE NOTE - NSIMPLEMENTINTERV_GEN_ALL_ED
Implemented All Fall Risk Interventions:  [place] to call system. Call bell, personal items and telephone within reach. Instruct patient to call for assistance. Room bathroom lighting operational. Non-slip footwear when patient is off stretcher. Physically safe environment: no spills, clutter or unnecessary equipment. Stretcher in lowest position, wheels locked, appropriate side rails in place. Provide visual cue, wrist band, yellow gown, etc. Monitor gait and stability. Monitor for mental status changes and reorient to person, place, and time. Review medications for side effects contributing to fall risk. Reinforce activity limits and safety measures with patient and family.

## 2021-12-11 NOTE — ED ADULT NURSE NOTE - OBJECTIVE STATEMENT
" I have been drinking alcohol for the past week and I don't feel well and I am withdrawing and I have a sinus infection."  Pt CO tremors and endorses Hx of admission s/t ETOH withdrawal.  Speech is clear, ambulating with steady gait.  Denies N/V/D, SOB, Fevers and CP.

## 2021-12-11 NOTE — ED PROVIDER NOTE - PATIENT PORTAL LINK FT
You can access the FollowMyHealth Patient Portal offered by [facility] by registering at the following website: [email or web address]. By joining [employer]’s FollowMyHealth portal, you will also be able to view your health information using other applications (apps) compatible with our system.

## 2021-12-11 NOTE — ED PROVIDER NOTE - NSFOLLOWUPINSTRUCTIONS_ED_ALL_ED_FT
Use flonase for sinus congestion and consider started an over the counter allergy medication    Consider going to rehab or detox    Substance Abuse    Chemical dependency is an addiction to drugs or alcohol. It is characterized by the repeated behavior of seeking out and using drugs and alcohol despite harmful consequences to the health and safety of oneself and others. Using drugs in a manner that brought you to an Emergency Room suggests you may have an drug abuse problem. Seek help at a drug addiction center.    SEEK IMMEDIATE MEDICAL CARE IF YOU HAVE ANY OF THE FOLLOWING SYMPTOMS: chest pain, shortness of breath, change in mental status, thoughts about hurting killing yourself, thoughts about hurting or killing somebody else, hallucinations, or worsening depression.    Sinusitis, Adult    Sinusitis is soreness and swelling (inflammation) of your sinuses. Sinuses are hollow spaces in the bones around your face. They are located:  •Around your eyes.      •In the middle of your forehead.      •Behind your nose.      •In your cheekbones.      Your sinuses and nasal passages are lined with a fluid called mucus. Mucus drains out of your sinuses. Swelling can trap mucus in your sinuses. This lets germs (bacteria, virus, or fungus) grow, which leads to infection. Most of the time, this condition is caused by a virus.      What are the causes?  This condition is caused by:  •Allergies.       •Asthma.       •Germs.      •Things that block your nose or sinuses.      •Growths in the nose (nasal polyps).      •Chemicals or irritants in the air.      •Fungus (rare).        What increases the risk?  You are more likely to develop this condition if:  •You have a weak body defense system (immune system).      •You do a lot of swimming or diving.      •You use nasal sprays too much.      •You smoke.        What are the signs or symptoms?  The main symptoms of this condition are pain and a feeling of pressure around the sinuses. Other symptoms include:  •Stuffy nose (congestion).      •Runny nose (drainage).      •Swelling and warmth in the sinuses.      •Headache.       •Toothache.      •A cough that may get worse at night.      •Mucus that collects in the throat or the back of the nose (postnasal drip).      •Being unable to smell and taste.      •Being very tired (fatigue).      •A fever.      •Sore throat.      •Bad breath.         How is this diagnosed?  This condition is diagnosed based on:  •Your symptoms.       •Your medical history.       •A physical exam.     •Tests to find out if your condition is short-term (acute) or long-term (chronic). Your doctor may:  •Check your nose for growths (polyps).      •Check your sinuses using a tool that has a light (endoscope).      •Check for allergies or germs.      •Do imaging tests, such as an MRI or CT scan.          How is this treated?  Treatment for this condition depends on the cause and whether it is short-term or long-term.•If caused by a virus, your symptoms should go away on their own within 10 days. You may be given medicines to relieve symptoms. They include:  •Medicines that shrink swollen tissue in the nose.       •Medicines that treat allergies (antihistamines).       •A spray that treats swelling of the nostrils.       •Rinses that help get rid of thick mucus in your nose (nasal saline washes).       •If caused by bacteria, your doctor may wait to see if you will get better without treatment. You may be given antibiotic medicine if you have:  •A very bad infection.      •A weak body defense system.        •If caused by growths in the nose, you may need to have surgery.        Follow these instructions at home:    Medicines     •Take, use, or apply over-the-counter and prescription medicines only as told by your doctor. These may include nasal sprays.      •If you were prescribed an antibiotic medicine, take it as told by your doctor. Do not stop taking the antibiotic even if you start to feel better.        Hydrate and humidify      •Drink enough water to keep your pee (urine) pale yellow.      •Use a cool mist humidifier to keep the humidity level in your home above 50%.      •Breathe in steam for 10–15 minutes, 3–4 times a day, or as told by your doctor. You can do this in the bathroom while a hot shower is running.      •Try not to spend time in cool or dry air.      Rest     •Rest as much as you can.      •Sleep with your head raised (elevated).      •Make sure you get enough sleep each night.        General instructions      •Put a warm, moist washcloth on your face 3–4 times a day, or as often as told by your doctor. This will help with discomfort.      •Wash your hands often with soap and water. If there is no soap and water, use hand .      • Do not smoke. Avoid being around people who are smoking (secondhand smoke).      •Keep all follow-up visits as told by your doctor. This is important.        Contact a doctor if:    •You have a fever.      •Your symptoms get worse.      •Your symptoms do not get better within 10 days.        Get help right away if:    •You have a very bad headache.      •You cannot stop throwing up (vomiting).      •You have very bad pain or swelling around your face or eyes.      •You have trouble seeing.      •You feel confused.      •Your neck is stiff.      •You have trouble breathing.        Summary    •Sinusitis is swelling of your sinuses. Sinuses are hollow spaces in the bones around your face.      •This condition is caused by tissues in your nose that become inflamed or swollen. This traps germs. These can lead to infection.      •If you were prescribed an antibiotic medicine, take it as told by your doctor. Do not stop taking it even if you start to feel better.      •Keep all follow-up visits as told by your doctor. This is important.      This information is not intended to replace advice given to you by your health care provider. Make sure you discuss any questions you have with your health care provider.

## 2021-12-11 NOTE — ED PROVIDER NOTE - OBJECTIVE STATEMENT
31 F pmh asthma, hypothyroid, etoh abuse w/ h/o w/d requiring admission last in July (no ICU admission, no w/d seizures) p/w sinus congestion x one week and reports feeling like she is withdrawing from etoh.  pt reports dull sinus pressure w/ congestion and dry cough- fully vaccinated for covid.  Also reports feeling anxious/shaky w/ 10+ episodes of bilious emesis today after trying to cut back on drinking.  typically drinks 1liter whiskey daily, drank 1/2 pint vodka today last drink around 4pm.  Was in rehab 2 weeks ago and reports relapsing.  Denies f/c, headache, dizziness, fainting, chest pain, sob, abd pain, diarrhea, urinary sxs, drug use, trauma

## 2021-12-11 NOTE — ED ADULT NURSE REASSESSMENT NOTE - NS ED NURSE REASSESS COMMENT FT1
Pt received in no acute distress, a&ox3, breathing with ease on room air. Pt with 20g heplock to LAC, intact, no edema or redness noted. No tremors noted. Denies any nausea, vomitting. Denies any hallucinations. Pt pending dispo home. Pt otherwise comfortable and assisted with all needs. Denies any other c/o. Will monitor.

## 2021-12-11 NOTE — ED PROVIDER NOTE - PROGRESS NOTE DETAILS
labs grossly normal, etoh 254.  improved w/ IVF/zofran/ativan, tolerating PO. will dc w/ flonase for nasal congestion.  offered resources for outpt rehab/detox- pt reports already having.  discussed strict return parameters

## 2021-12-11 NOTE — ED PROVIDER NOTE - CLINICAL SUMMARY MEDICAL DECISION MAKING FREE TEXT BOX
31 F pmh asthma, hypothyroid, etoh abuse w/ h/o w/d requiring admission last in July (no ICU admission, no w/d seizures) p/w sinus congestion x one week and reports feeling like she is withdrawing from etoh.  pt w/ mild tachycardia and hypertension, tremor w/ arms extended only, abd soft/nt, steady gait.  ? mild etoh w/d.  will obtain labs w/ etoh level, covid pcr, give IVF/zofran/ativan.  reassess and plan to dc w/ flonase for nasal congestion.

## 2021-12-13 ENCOUNTER — APPOINTMENT (OUTPATIENT)
Dept: INTERNAL MEDICINE | Facility: CLINIC | Age: 31
End: 2021-12-13

## 2021-12-24 ENCOUNTER — EMERGENCY (EMERGENCY)
Facility: HOSPITAL | Age: 31
LOS: 1 days | Discharge: HOME | End: 2021-12-24
Attending: EMERGENCY MEDICINE | Admitting: EMERGENCY MEDICINE
Payer: COMMERCIAL

## 2021-12-24 VITALS
RESPIRATION RATE: 18 BRPM | DIASTOLIC BLOOD PRESSURE: 68 MMHG | OXYGEN SATURATION: 99 % | TEMPERATURE: 98 F | SYSTOLIC BLOOD PRESSURE: 105 MMHG | HEART RATE: 93 BPM

## 2021-12-24 VITALS
DIASTOLIC BLOOD PRESSURE: 91 MMHG | RESPIRATION RATE: 18 BRPM | HEART RATE: 116 BPM | SYSTOLIC BLOOD PRESSURE: 142 MMHG | TEMPERATURE: 98 F | HEIGHT: 62 IN | OXYGEN SATURATION: 99 % | WEIGHT: 132.94 LBS

## 2021-12-24 DIAGNOSIS — E03.9 HYPOTHYROIDISM, UNSPECIFIED: ICD-10-CM

## 2021-12-24 DIAGNOSIS — R11.0 NAUSEA: ICD-10-CM

## 2021-12-24 DIAGNOSIS — Z20.822 CONTACT WITH AND (SUSPECTED) EXPOSURE TO COVID-19: ICD-10-CM

## 2021-12-24 DIAGNOSIS — J45.909 UNSPECIFIED ASTHMA, UNCOMPLICATED: ICD-10-CM

## 2021-12-24 DIAGNOSIS — E83.42 HYPOMAGNESEMIA: ICD-10-CM

## 2021-12-24 DIAGNOSIS — F10.10 ALCOHOL ABUSE, UNCOMPLICATED: ICD-10-CM

## 2021-12-24 DIAGNOSIS — E87.6 HYPOKALEMIA: ICD-10-CM

## 2021-12-24 DIAGNOSIS — Z91.013 ALLERGY TO SEAFOOD: ICD-10-CM

## 2021-12-24 LAB
ALBUMIN SERPL ELPH-MCNC: 5 G/DL — SIGNIFICANT CHANGE UP (ref 3.3–5)
ALP SERPL-CCNC: 67 U/L — SIGNIFICANT CHANGE UP (ref 40–120)
ALT FLD-CCNC: 50 U/L — HIGH (ref 10–45)
AMPHET UR-MCNC: NEGATIVE — SIGNIFICANT CHANGE UP
ANION GAP SERPL CALC-SCNC: 19 MMOL/L — HIGH (ref 5–17)
APPEARANCE UR: CLEAR — SIGNIFICANT CHANGE UP
APTT BLD: 28.8 SEC — SIGNIFICANT CHANGE UP (ref 27.5–35.5)
AST SERPL-CCNC: 71 U/L — HIGH (ref 10–40)
B-OH-BUTYR SERPL-SCNC: 1.1 MMOL/L — HIGH
BACTERIA # UR AUTO: PRESENT /HPF
BARBITURATES UR SCN-MCNC: NEGATIVE — SIGNIFICANT CHANGE UP
BASE EXCESS BLDV CALC-SCNC: 2.6 MMOL/L — SIGNIFICANT CHANGE UP (ref -2–3)
BASOPHILS # BLD AUTO: 0.1 K/UL — SIGNIFICANT CHANGE UP (ref 0–0.2)
BASOPHILS NFR BLD AUTO: 1.5 % — SIGNIFICANT CHANGE UP (ref 0–2)
BENZODIAZ UR-MCNC: POSITIVE
BILIRUB SERPL-MCNC: 0.7 MG/DL — SIGNIFICANT CHANGE UP (ref 0.2–1.2)
BILIRUB UR-MCNC: NEGATIVE — SIGNIFICANT CHANGE UP
BUN SERPL-MCNC: 7 MG/DL — SIGNIFICANT CHANGE UP (ref 7–23)
CA-I SERPL-SCNC: 1.06 MMOL/L — LOW (ref 1.15–1.33)
CALCIUM SERPL-MCNC: 8.6 MG/DL — SIGNIFICANT CHANGE UP (ref 8.4–10.5)
CHLORIDE SERPL-SCNC: 100 MMOL/L — SIGNIFICANT CHANGE UP (ref 96–108)
CO2 BLDV-SCNC: 28.5 MMOL/L — HIGH (ref 22–26)
CO2 SERPL-SCNC: 26 MMOL/L — SIGNIFICANT CHANGE UP (ref 22–31)
COCAINE METAB.OTHER UR-MCNC: NEGATIVE — SIGNIFICANT CHANGE UP
COLOR SPEC: YELLOW — SIGNIFICANT CHANGE UP
CREAT SERPL-MCNC: 0.79 MG/DL — SIGNIFICANT CHANGE UP (ref 0.5–1.3)
DIFF PNL FLD: ABNORMAL
EOSINOPHIL # BLD AUTO: 0.01 K/UL — SIGNIFICANT CHANGE UP (ref 0–0.5)
EOSINOPHIL NFR BLD AUTO: 0.2 % — SIGNIFICANT CHANGE UP (ref 0–6)
EPI CELLS # UR: ABNORMAL /HPF (ref 0–5)
ETHANOL SERPL-MCNC: 182 MG/DL — HIGH (ref 0–10)
GAS PNL BLDV: 144 MMOL/L — SIGNIFICANT CHANGE UP (ref 136–145)
GAS PNL BLDV: SIGNIFICANT CHANGE UP
GAS PNL BLDV: SIGNIFICANT CHANGE UP
GLUCOSE SERPL-MCNC: 104 MG/DL — HIGH (ref 70–99)
GLUCOSE UR QL: NEGATIVE — SIGNIFICANT CHANGE UP
HCG SERPL-ACNC: <0 MIU/ML — SIGNIFICANT CHANGE UP
HCO3 BLDV-SCNC: 27 MMOL/L — SIGNIFICANT CHANGE UP (ref 22–29)
HCT VFR BLD CALC: 37.3 % — SIGNIFICANT CHANGE UP (ref 34.5–45)
HGB BLD-MCNC: 11.6 G/DL — SIGNIFICANT CHANGE UP (ref 11.5–15.5)
IMM GRANULOCYTES NFR BLD AUTO: 0.5 % — SIGNIFICANT CHANGE UP (ref 0–1.5)
INR BLD: 1.1 — SIGNIFICANT CHANGE UP (ref 0.88–1.16)
KETONES UR-MCNC: >=80 MG/DL
LEUKOCYTE ESTERASE UR-ACNC: NEGATIVE — SIGNIFICANT CHANGE UP
LYMPHOCYTES # BLD AUTO: 2.13 K/UL — SIGNIFICANT CHANGE UP (ref 1–3.3)
LYMPHOCYTES # BLD AUTO: 32.2 % — SIGNIFICANT CHANGE UP (ref 13–44)
MAGNESIUM SERPL-MCNC: 1.7 MG/DL — SIGNIFICANT CHANGE UP (ref 1.6–2.6)
MCHC RBC-ENTMCNC: 31.1 GM/DL — LOW (ref 32–36)
MCHC RBC-ENTMCNC: 31.4 PG — SIGNIFICANT CHANGE UP (ref 27–34)
MCV RBC AUTO: 101.1 FL — HIGH (ref 80–100)
METHADONE UR-MCNC: NEGATIVE — SIGNIFICANT CHANGE UP
MONOCYTES # BLD AUTO: 0.76 K/UL — SIGNIFICANT CHANGE UP (ref 0–0.9)
MONOCYTES NFR BLD AUTO: 11.5 % — SIGNIFICANT CHANGE UP (ref 2–14)
NEUTROPHILS # BLD AUTO: 3.59 K/UL — SIGNIFICANT CHANGE UP (ref 1.8–7.4)
NEUTROPHILS NFR BLD AUTO: 54.1 % — SIGNIFICANT CHANGE UP (ref 43–77)
NITRITE UR-MCNC: NEGATIVE — SIGNIFICANT CHANGE UP
NRBC # BLD: 0 /100 WBCS — SIGNIFICANT CHANGE UP (ref 0–0)
OPIATES UR-MCNC: NEGATIVE — SIGNIFICANT CHANGE UP
PCO2 BLDV: 41 MMHG — SIGNIFICANT CHANGE UP (ref 39–42)
PCP SPEC-MCNC: SIGNIFICANT CHANGE UP
PCP UR-MCNC: NEGATIVE — SIGNIFICANT CHANGE UP
PH BLDV: 7.43 — SIGNIFICANT CHANGE UP (ref 7.32–7.43)
PH UR: 6.5 — SIGNIFICANT CHANGE UP (ref 5–8)
PLATELET # BLD AUTO: 291 K/UL — SIGNIFICANT CHANGE UP (ref 150–400)
PO2 BLDV: 40 MMHG — SIGNIFICANT CHANGE UP (ref 25–45)
POTASSIUM BLDV-SCNC: 2.9 MMOL/L — CRITICAL LOW (ref 3.5–5.1)
POTASSIUM SERPL-MCNC: 2.9 MMOL/L — CRITICAL LOW (ref 3.5–5.3)
POTASSIUM SERPL-SCNC: 2.9 MMOL/L — CRITICAL LOW (ref 3.5–5.3)
PROT SERPL-MCNC: 8.2 G/DL — SIGNIFICANT CHANGE UP (ref 6–8.3)
PROT UR-MCNC: 100 MG/DL
PROTHROM AB SERPL-ACNC: 13.1 SEC — SIGNIFICANT CHANGE UP (ref 10.6–13.6)
RBC # BLD: 3.69 M/UL — LOW (ref 3.8–5.2)
RBC # FLD: 18 % — HIGH (ref 10.3–14.5)
RBC CASTS # UR COMP ASSIST: > 10 /HPF
SAO2 % BLDV: 66.9 % — LOW (ref 67–88)
SARS-COV-2 RNA SPEC QL NAA+PROBE: NEGATIVE — SIGNIFICANT CHANGE UP
SODIUM SERPL-SCNC: 145 MMOL/L — SIGNIFICANT CHANGE UP (ref 135–145)
SP GR SPEC: >=1.03 — SIGNIFICANT CHANGE UP (ref 1–1.03)
THC UR QL: NEGATIVE — SIGNIFICANT CHANGE UP
UROBILINOGEN FLD QL: 0.2 E.U./DL — SIGNIFICANT CHANGE UP
WBC # BLD: 6.62 K/UL — SIGNIFICANT CHANGE UP (ref 3.8–10.5)
WBC # FLD AUTO: 6.62 K/UL — SIGNIFICANT CHANGE UP (ref 3.8–10.5)
WBC UR QL: < 5 /HPF — SIGNIFICANT CHANGE UP

## 2021-12-24 PROCEDURE — 36415 COLL VENOUS BLD VENIPUNCTURE: CPT

## 2021-12-24 PROCEDURE — 85025 COMPLETE CBC W/AUTO DIFF WBC: CPT

## 2021-12-24 PROCEDURE — 80307 DRUG TEST PRSMV CHEM ANLYZR: CPT

## 2021-12-24 PROCEDURE — 84132 ASSAY OF SERUM POTASSIUM: CPT

## 2021-12-24 PROCEDURE — 96376 TX/PRO/DX INJ SAME DRUG ADON: CPT

## 2021-12-24 PROCEDURE — 96375 TX/PRO/DX INJ NEW DRUG ADDON: CPT

## 2021-12-24 PROCEDURE — 85610 PROTHROMBIN TIME: CPT

## 2021-12-24 PROCEDURE — 87635 SARS-COV-2 COVID-19 AMP PRB: CPT

## 2021-12-24 PROCEDURE — 84295 ASSAY OF SERUM SODIUM: CPT

## 2021-12-24 PROCEDURE — 84702 CHORIONIC GONADOTROPIN TEST: CPT

## 2021-12-24 PROCEDURE — 82010 KETONE BODYS QUAN: CPT

## 2021-12-24 PROCEDURE — 83735 ASSAY OF MAGNESIUM: CPT

## 2021-12-24 PROCEDURE — 99285 EMERGENCY DEPT VISIT HI MDM: CPT

## 2021-12-24 PROCEDURE — 85730 THROMBOPLASTIN TIME PARTIAL: CPT

## 2021-12-24 PROCEDURE — 99284 EMERGENCY DEPT VISIT MOD MDM: CPT | Mod: 25

## 2021-12-24 PROCEDURE — 96374 THER/PROPH/DIAG INJ IV PUSH: CPT

## 2021-12-24 PROCEDURE — 93010 ELECTROCARDIOGRAM REPORT: CPT

## 2021-12-24 PROCEDURE — 81001 URINALYSIS AUTO W/SCOPE: CPT

## 2021-12-24 PROCEDURE — 82803 BLOOD GASES ANY COMBINATION: CPT

## 2021-12-24 PROCEDURE — 82330 ASSAY OF CALCIUM: CPT

## 2021-12-24 PROCEDURE — 80053 COMPREHEN METABOLIC PANEL: CPT

## 2021-12-24 RX ORDER — ONDANSETRON 8 MG/1
4 TABLET, FILM COATED ORAL ONCE
Refills: 0 | Status: COMPLETED | OUTPATIENT
Start: 2021-12-24 | End: 2021-12-24

## 2021-12-24 RX ORDER — POTASSIUM CHLORIDE 20 MEQ
10 PACKET (EA) ORAL
Refills: 0 | Status: DISCONTINUED | OUTPATIENT
Start: 2021-12-24 | End: 2021-12-27

## 2021-12-24 RX ORDER — MAGNESIUM SULFATE 500 MG/ML
2 VIAL (ML) INJECTION ONCE
Refills: 0 | Status: COMPLETED | OUTPATIENT
Start: 2021-12-24 | End: 2021-12-24

## 2021-12-24 RX ORDER — SODIUM CHLORIDE 9 MG/ML
1000 INJECTION, SOLUTION INTRAVENOUS
Refills: 0 | Status: DISCONTINUED | OUTPATIENT
Start: 2021-12-24 | End: 2021-12-27

## 2021-12-24 RX ADMIN — Medication 100 MILLIEQUIVALENT(S): at 03:40

## 2021-12-24 RX ADMIN — Medication 25 GRAM(S): at 02:37

## 2021-12-24 RX ADMIN — ONDANSETRON 4 MILLIGRAM(S): 8 TABLET, FILM COATED ORAL at 05:39

## 2021-12-24 RX ADMIN — ONDANSETRON 4 MILLIGRAM(S): 8 TABLET, FILM COATED ORAL at 02:37

## 2021-12-24 RX ADMIN — SODIUM CHLORIDE 500 MILLILITER(S): 9 INJECTION, SOLUTION INTRAVENOUS at 02:36

## 2021-12-24 RX ADMIN — Medication 1 MILLIGRAM(S): at 02:37

## 2021-12-24 NOTE — ED ADULT NURSE NOTE - OBJECTIVE STATEMENT
received A&Ox3, ambulatory 31years old female pt with c/o of " I am going through alcohol withdrawal." pt's last alcohol intake was [place] 1 pint last night. pt  presents to the ED with tremors and nausea. denies fever, hallucination, sweating, and headache. no fever, sob. will continue to monitor. no fall or LOC

## 2021-12-24 NOTE — ED PROVIDER NOTE - PROGRESS NOTE DETAILS
pt hypokalemic, hypomagnesemic - will replete pt feeling better, offered admission however pt wants to go home. reiterated dangers of daily alcohol ingestion. pt declined information regarding rehab/detox.  no tremors currently, no vomiting. pt declined prescription for antiemetics  I have discussed the discharge plan with the patient. The patient agrees with the plan, as discussed.  The patient understands Emergency Department diagnosis is a preliminary diagnosis often based on limited information and that the patient must adhere to the follow-up plan as discussed.  The patient understands that if the symptoms worsen the patient may return to the Emergency Department at any time for further evaluation and treatment.

## 2021-12-24 NOTE — ED PROVIDER NOTE - OBJECTIVE STATEMENT
31F hx etoh abuse, hypothyroid, asthma, c/o symptoms of alcohol withdrawal. pt states her last drink was last night. states feeling nauseated and tremulous. no vomiting, no abd pain. no diarrhea. pt states has had withdrawal in the past, however not seizures.

## 2021-12-24 NOTE — ED PROVIDER NOTE - PATIENT PORTAL LINK FT
You can access the FollowMyHealth Patient Portal offered by [facility] by registering at the following website: [email or web address]. By joining [employer]’s FollowMyHealth portal, you will also be able to view your health information using other applications (apps) compatible with our system.

## 2021-12-24 NOTE — ED PROVIDER NOTE - CLINICAL SUMMARY MEDICAL DECISION MAKING FREE TEXT BOX
c/o withdrawal symptoms, including tremors, nausea, no abd pain on exam, afebrile.   -check labs  -ivf  -ekg  -atbandar kessleran

## 2021-12-24 NOTE — ED PROVIDER NOTE - NSFOLLOWUPINSTRUCTIONS_ED_ALL_ED_FT
Abuse of Alcohol    WHAT YOU NEED TO KNOW:    Alcohol abuse means you drink more than the recommended daily or weekly limits. You may be drinking alcohol regularly or drinking large amounts in a short period of time (binge drinking). You continue to drink even though it causes legal, work, or relationship problems.    DISCHARGE INSTRUCTIONS:    Call your local emergency number (911 in the ) for any of the following:   •You have sudden chest pain or trouble breathing.    •You want to harm yourself or others.    •You have a seizure or have shaking or trembling.    Call your doctor if:   •You have hallucinations (you see or hear things that are not real).    •You cannot stop vomiting or you vomit blood.    •You need help to stop drinking alcohol.     •You have questions or concerns about your condition or care.    Medicines:   •Vitamin supplements may be given to treat low vitamin levels. Alcohol can make it hard for your body to absorb enough vitamins such as B1. Vitamin supplements may also be given to prevent alcohol related brain damage.     •Take your medicine as directed. Contact your healthcare provider if you think your medicine is not helping or if you have side effects. Tell him or her if you are allergic to any medicine. Keep a list of the medicines, vitamins, and herbs you take. Include the amounts, and when and why you take them. Bring the list or the pill bottles to follow-up visits. Carry your medicine list with you in case of an emergency.    Health problems alcohol abuse can cause:   •Cancer in your liver, pancreas, stomach, colon, kidney, or breast    •Stroke or a heart attack    •Liver, kidney, or lung disease    •Blackouts, memory loss, brain damage, or dementia    •Diabetes, immune system problems, or thiamine (vitamin B1) deficiency    •Problems for you and your baby if you drink while pregnant    Recommended alcohol limits:   •Men 21 to 64 years should limit alcohol to 2 drinks a day. Do not have more than 4 drinks in 1 day or more than 14 in 1 week.    •All women, and men 65 or older should limit alcohol to 1 drink in a day. Do not have more than 3 drinks in 1 day or more than 7 in 1 week. No amount of alcohol is okay during pregnancy.    Manage alcohol use:   •Decrease the amount you drink. This can help prevent health problems such as brain, heart, and liver damage, high blood pressure, diabetes, and cancer. If you cannot stop completely, healthcare providers can help you set goals to decrease the amount you drink.    •Plan weekly alcohol use. You will be less likely to drink more than the recommended limit if you plan ahead.    •Have food when you drink alcohol. Food will prevent alcohol from getting into your system too quickly. Eat before you have your first alcohol drink.    •Time your drinks carefully. Have no more than 1 drink in an hour. Have a liquid such as water, coffee, or a soft drink between alcohol drinks.    •Do not drive if you have had alcohol. Make sure someone who has not been drinking can help you get home.    •Do not drink alcohol if you are taking medicine. Alcohol is dangerous when you combine it with certain medicines, such as acetaminophen or blood pressure medicine. Talk to your healthcare provider about all the medicines you currently take.    Follow up with your healthcare provider as directed: Write down your questions so you remember to ask them during your visits.    For support and more information:   •Alcoholics Anonymous  Web Address: http://www.aa.org    •Substance Abuse and Mental Health Services Administration  [address]  [place] [address]  Web Address: http://www.[facility].gov      Hypokalemia    WHAT YOU NEED TO KNOW:    Hypokalemia is a low level of potassium in your blood. Potassium helps control how your muscles, heart, and digestive system work. Hypokalemia occurs when your body loses too much potassium or does not absorb enough from food.     DISCHARGE INSTRUCTIONS:    Return to the emergency department if:   •You cannot move your arm or leg.    •You have a fast or irregular heartbeat.    •You are too tired or weak to stand up.    Contact your healthcare provider if:   •You are vomiting, or you have diarrhea.    •You have numbness or tingling in your arms or legs.    •Your symptoms do not go away or they get worse.    •You have questions or concerns about your condition or care.    Medicines:   •Potassium will be given to bring your potassium levels back to normal.    •Take your medicine as directed. Contact your healthcare provider if you think your medicine is not helping or if you have side effects. Tell him of her if you are allergic to any medicine. Keep a list of the medicines, vitamins, and herbs you take. Include the amounts, and when and why you take them. Bring the list or the pill bottles to follow-up visits. Carry your medicine list with you in case of an emergency.    Eat foods that are high in potassium: Foods that are high in potassium include bananas, oranges, tomatoes, potatoes, and avocado. Kimball beans, turkey, salmon, lean beef, yogurt, and milk are also high in potassium. Ask your healthcare provider or dietitian for more information about foods that are high in potassium.     Follow up with your healthcare provider as directed: Write down your questions so you remember to ask them during your visits.

## 2022-01-16 ENCOUNTER — EMERGENCY (EMERGENCY)
Facility: HOSPITAL | Age: 32
LOS: 1 days | Discharge: HOME | End: 2022-01-16
Attending: EMERGENCY MEDICINE | Admitting: EMERGENCY MEDICINE
Payer: COMMERCIAL

## 2022-01-16 VITALS
RESPIRATION RATE: 18 BRPM | HEIGHT: 62 IN | DIASTOLIC BLOOD PRESSURE: 86 MMHG | SYSTOLIC BLOOD PRESSURE: 130 MMHG | HEART RATE: 106 BPM | OXYGEN SATURATION: 99 % | TEMPERATURE: 98 F | WEIGHT: 130.07 LBS

## 2022-01-16 VITALS
DIASTOLIC BLOOD PRESSURE: 73 MMHG | SYSTOLIC BLOOD PRESSURE: 105 MMHG | HEART RATE: 82 BPM | TEMPERATURE: 98 F | RESPIRATION RATE: 18 BRPM | OXYGEN SATURATION: 98 %

## 2022-01-16 DIAGNOSIS — Z91.013 ALLERGY TO SEAFOOD: ICD-10-CM

## 2022-01-16 DIAGNOSIS — Z20.822 CONTACT WITH AND (SUSPECTED) EXPOSURE TO COVID-19: ICD-10-CM

## 2022-01-16 DIAGNOSIS — R11.2 NAUSEA WITH VOMITING, UNSPECIFIED: ICD-10-CM

## 2022-01-16 DIAGNOSIS — J45.909 UNSPECIFIED ASTHMA, UNCOMPLICATED: ICD-10-CM

## 2022-01-16 DIAGNOSIS — Z23 ENCOUNTER FOR IMMUNIZATION: ICD-10-CM

## 2022-01-16 DIAGNOSIS — F17.200 NICOTINE DEPENDENCE, UNSPECIFIED, UNCOMPLICATED: ICD-10-CM

## 2022-01-16 DIAGNOSIS — F10.129 ALCOHOL ABUSE WITH INTOXICATION, UNSPECIFIED: ICD-10-CM

## 2022-01-16 DIAGNOSIS — E03.9 HYPOTHYROIDISM, UNSPECIFIED: ICD-10-CM

## 2022-01-16 LAB
ALBUMIN SERPL ELPH-MCNC: 4.2 G/DL — SIGNIFICANT CHANGE UP (ref 3.3–5)
ALP SERPL-CCNC: 87 U/L — SIGNIFICANT CHANGE UP (ref 40–120)
ALT FLD-CCNC: 92 U/L — HIGH (ref 10–45)
ANION GAP SERPL CALC-SCNC: 12 MMOL/L — SIGNIFICANT CHANGE UP (ref 5–17)
AST SERPL-CCNC: 160 U/L — HIGH (ref 10–40)
BASOPHILS # BLD AUTO: 0.07 K/UL — SIGNIFICANT CHANGE UP (ref 0–0.2)
BASOPHILS NFR BLD AUTO: 0.9 % — SIGNIFICANT CHANGE UP (ref 0–2)
BILIRUB SERPL-MCNC: 0.4 MG/DL — SIGNIFICANT CHANGE UP (ref 0.2–1.2)
BUN SERPL-MCNC: 7 MG/DL — SIGNIFICANT CHANGE UP (ref 7–23)
CALCIUM SERPL-MCNC: 8.3 MG/DL — LOW (ref 8.4–10.5)
CHLORIDE SERPL-SCNC: 105 MMOL/L — SIGNIFICANT CHANGE UP (ref 96–108)
CO2 SERPL-SCNC: 31 MMOL/L — SIGNIFICANT CHANGE UP (ref 22–31)
CREAT SERPL-MCNC: 0.74 MG/DL — SIGNIFICANT CHANGE UP (ref 0.5–1.3)
EOSINOPHIL # BLD AUTO: 0.02 K/UL — SIGNIFICANT CHANGE UP (ref 0–0.5)
EOSINOPHIL NFR BLD AUTO: 0.3 % — SIGNIFICANT CHANGE UP (ref 0–6)
ETHANOL SERPL-MCNC: 448 MG/DL — HIGH (ref 0–10)
GLUCOSE SERPL-MCNC: 88 MG/DL — SIGNIFICANT CHANGE UP (ref 70–99)
HCT VFR BLD CALC: 39.6 % — SIGNIFICANT CHANGE UP (ref 34.5–45)
HGB BLD-MCNC: 12.4 G/DL — SIGNIFICANT CHANGE UP (ref 11.5–15.5)
IMM GRANULOCYTES NFR BLD AUTO: 0.4 % — SIGNIFICANT CHANGE UP (ref 0–1.5)
LIDOCAIN IGE QN: 47 U/L — SIGNIFICANT CHANGE UP (ref 7–60)
LYMPHOCYTES # BLD AUTO: 2.83 K/UL — SIGNIFICANT CHANGE UP (ref 1–3.3)
LYMPHOCYTES # BLD AUTO: 35.9 % — SIGNIFICANT CHANGE UP (ref 13–44)
MCHC RBC-ENTMCNC: 31.3 GM/DL — LOW (ref 32–36)
MCHC RBC-ENTMCNC: 31.7 PG — SIGNIFICANT CHANGE UP (ref 27–34)
MCV RBC AUTO: 101.3 FL — HIGH (ref 80–100)
MONOCYTES # BLD AUTO: 1.06 K/UL — HIGH (ref 0–0.9)
MONOCYTES NFR BLD AUTO: 13.4 % — SIGNIFICANT CHANGE UP (ref 2–14)
NEUTROPHILS # BLD AUTO: 3.88 K/UL — SIGNIFICANT CHANGE UP (ref 1.8–7.4)
NEUTROPHILS NFR BLD AUTO: 49.1 % — SIGNIFICANT CHANGE UP (ref 43–77)
NRBC # BLD: 0 /100 WBCS — SIGNIFICANT CHANGE UP (ref 0–0)
PLATELET # BLD AUTO: 174 K/UL — SIGNIFICANT CHANGE UP (ref 150–400)
POTASSIUM SERPL-MCNC: 3 MMOL/L — LOW (ref 3.5–5.3)
POTASSIUM SERPL-SCNC: 3 MMOL/L — LOW (ref 3.5–5.3)
PROT SERPL-MCNC: 7.5 G/DL — SIGNIFICANT CHANGE UP (ref 6–8.3)
RBC # BLD: 3.91 M/UL — SIGNIFICANT CHANGE UP (ref 3.8–5.2)
RBC # FLD: 18.4 % — HIGH (ref 10.3–14.5)
SARS-COV-2 RNA SPEC QL NAA+PROBE: SIGNIFICANT CHANGE UP
SODIUM SERPL-SCNC: 148 MMOL/L — HIGH (ref 135–145)
WBC # BLD: 7.89 K/UL — SIGNIFICANT CHANGE UP (ref 3.8–10.5)
WBC # FLD AUTO: 7.89 K/UL — SIGNIFICANT CHANGE UP (ref 3.8–10.5)

## 2022-01-16 PROCEDURE — 83735 ASSAY OF MAGNESIUM: CPT

## 2022-01-16 PROCEDURE — 85025 COMPLETE CBC W/AUTO DIFF WBC: CPT

## 2022-01-16 PROCEDURE — 87635 SARS-COV-2 COVID-19 AMP PRB: CPT

## 2022-01-16 PROCEDURE — 80307 DRUG TEST PRSMV CHEM ANLYZR: CPT

## 2022-01-16 PROCEDURE — 90471 IMMUNIZATION ADMIN: CPT

## 2022-01-16 PROCEDURE — 99284 EMERGENCY DEPT VISIT MOD MDM: CPT

## 2022-01-16 PROCEDURE — 96375 TX/PRO/DX INJ NEW DRUG ADDON: CPT

## 2022-01-16 PROCEDURE — 96374 THER/PROPH/DIAG INJ IV PUSH: CPT

## 2022-01-16 PROCEDURE — 99285 EMERGENCY DEPT VISIT HI MDM: CPT | Mod: 25

## 2022-01-16 PROCEDURE — 36415 COLL VENOUS BLD VENIPUNCTURE: CPT

## 2022-01-16 PROCEDURE — 83690 ASSAY OF LIPASE: CPT

## 2022-01-16 PROCEDURE — 84100 ASSAY OF PHOSPHORUS: CPT

## 2022-01-16 PROCEDURE — 80053 COMPREHEN METABOLIC PANEL: CPT

## 2022-01-16 PROCEDURE — 90715 TDAP VACCINE 7 YRS/> IM: CPT

## 2022-01-16 RX ORDER — MAGNESIUM SULFATE 500 MG/ML
1 VIAL (ML) INJECTION ONCE
Refills: 0 | Status: COMPLETED | OUTPATIENT
Start: 2022-01-16 | End: 2022-01-16

## 2022-01-16 RX ORDER — POTASSIUM CHLORIDE 20 MEQ
40 PACKET (EA) ORAL ONCE
Refills: 0 | Status: COMPLETED | OUTPATIENT
Start: 2022-01-16 | End: 2022-01-16

## 2022-01-16 RX ORDER — SODIUM CHLORIDE 9 MG/ML
1000 INJECTION, SOLUTION INTRAVENOUS
Refills: 0 | Status: DISCONTINUED | OUTPATIENT
Start: 2022-01-16 | End: 2022-01-19

## 2022-01-16 RX ORDER — TETANUS TOXOID, REDUCED DIPHTHERIA TOXOID AND ACELLULAR PERTUSSIS VACCINE, ADSORBED 5; 2.5; 8; 8; 2.5 [IU]/.5ML; [IU]/.5ML; UG/.5ML; UG/.5ML; UG/.5ML
0.5 SUSPENSION INTRAMUSCULAR ONCE
Refills: 0 | Status: COMPLETED | OUTPATIENT
Start: 2022-01-16 | End: 2022-01-16

## 2022-01-16 RX ORDER — ONDANSETRON 8 MG/1
4 TABLET, FILM COATED ORAL ONCE
Refills: 0 | Status: COMPLETED | OUTPATIENT
Start: 2022-01-16 | End: 2022-01-16

## 2022-01-16 RX ADMIN — Medication 40 MILLIEQUIVALENT(S): at 06:17

## 2022-01-16 RX ADMIN — TETANUS TOXOID, REDUCED DIPHTHERIA TOXOID AND ACELLULAR PERTUSSIS VACCINE, ADSORBED 0.5 MILLILITER(S): 5; 2.5; 8; 8; 2.5 SUSPENSION INTRAMUSCULAR at 04:57

## 2022-01-16 RX ADMIN — SODIUM CHLORIDE 500 MILLILITER(S): 9 INJECTION, SOLUTION INTRAVENOUS at 05:20

## 2022-01-16 RX ADMIN — ONDANSETRON 4 MILLIGRAM(S): 8 TABLET, FILM COATED ORAL at 05:25

## 2022-01-16 RX ADMIN — Medication 100 GRAM(S): at 06:53

## 2022-01-16 NOTE — ED ADULT TRIAGE NOTE - BRAND OF COVID-19 VACCINATION
Attending Statement: I discussed the patient's case with the Resident.  I reviewed and agree with the resident's findings and plan, as documented in encounter.    Shelley Razo, DO         Pfizer dose 1 and 2

## 2022-01-16 NOTE — ED PROVIDER NOTE - PATIENT PORTAL LINK FT
You can access the FollowMyHealth Patient Portal offered by [facility] by registering at the following website: [email or web address]. By joining [employer]’s FollowMyHealth portal, you will also be able to view your health information using other applications (apps) compatible with our system.

## 2022-01-16 NOTE — ED PROVIDER NOTE - MUSCULOSKELETAL, MLM
range of motion is not limited, no muscle or joint tenderness.  right 2nd finger with scap to proximal phalanx, no erythema, no swelling, +FROM at DIPJ and PIPJ, sensation intact distally

## 2022-01-16 NOTE — ED ADULT NURSE NOTE - NSIMPLEMENTINTERV_GEN_ALL_ED
Implemented All Fall Risk Interventions:  [place] to call system. Call bell, personal items and telephone within reach. Instruct patient to call for assistance. Room bathroom lighting operational. Non-slip footwear when patient is off stretcher. Physically safe environment: no spills, clutter or unnecessary equipment. Stretcher in lowest position, wheels locked, appropriate side rails in place. Provide visual cue, wrist band, yellow gown, etc. Monitor gait and stability. Monitor for mental status changes and reorient to person, place, and time. Review medications for side effects contributing to fall risk. Reinforce activity limits and safety measures with patient and family.

## 2022-01-16 NOTE — ED PROVIDER NOTE - ATTENDING CONTRIBUTION TO CARE
From: Gena Cristobal  To: Denia Mcfarland MD  Sent: 10/31/2018 6:16 AM CDT  Subject: Non-Urgent Medical Question    This message is being sent by Frederic Roy on behalf of Maximiliano Wayne. Jorge Henley received the flu vaccination last week on Wednesday 10/24.      Thanks,  [employer] Pt w/ PMhx alcohol abuse presents stating she has been drinking for the past 2 weeks everyday, hasn't eaten in 2 days.  Pt reports some nausea and vomiting in the past few days, last was earlier this morning. She reports some epigastric disomfort.  Pt also cut her right 2nd finger 2 weeks ago and concerned she needs a tetanus shot.  Pt has been feeling shaky this evening and thinks she could be in alcohol withdrawal, although reports drinking a pint of [place] tonight.  Denies fever, chills, abd pain, CP, SOB, headache, AH/VH, all other ROS negative. She states she has tried both inpatient and outpatient detox. She denies drug use. no trauma  Limited PE performed in the setting of the COVID10 pandemic, in efforts to limit exposure and cross-contamination  Constitutional: Well appearing, awake, alert, oriented to person, place, time/situation and in no apparent distress.  Head atraumatic.   ENMT: Airway patent. No tongue fasciculations, mildly dry MM  Eyes: Clear bilaterally, PERRL, EOMI  Cardiac: Normal rate, regular rhythm.   Respiratory: No increased WOB, tachypnea, hypoxia, or accessory mm use. Pt speaks in full sentences.   Gastrointestinal: Abd soft, NT, ND. No guarding, rebound, or rigidity.   Musculoskeletal: Range of motion is not limited  Neuro: Alert and oriented x 3, face symmetric and speech fluent. Nml gross motor movement, grossly non focal. no tremors  Skin: Skin normal color for race, warm, dry and intact. No evidence of rash. no diaphoresis  Psych: Alert and oriented to person, place, time/situation. normal mood and affect. no apparent risk to self or others. clinically intoxicated  Alcohol abuse w/ clinical intoxication. No signs of w/d on exam. Reports no PO intake, vomiting, although not since this morning. benign, non tender abd. R/o AKA, dehydration, other electrolyte / metabolic disturbances, pancreatitis. Observe for sobriety, but also for sx w/d  Alcohol > 400. Lytes replenished. IVF. Observe for sobriety. Care s/o to day team for continued monitoring

## 2022-01-16 NOTE — ED PROVIDER NOTE - NSFOLLOWUPINSTRUCTIONS_ED_ALL_ED_FT
Abuse of Alcohol    WHAT YOU NEED TO KNOW:    Alcohol abuse means you drink more than the recommended daily or weekly limits. You may be drinking alcohol regularly or drinking large amounts in a short period of time (binge drinking). You continue to drink even when it causes legal, work, or relationship problems.    DISCHARGE INSTRUCTIONS:    Call your local emergency number (911 in the ), or have someone call if:   •You have sudden chest pain or trouble breathing.      •You want to harm yourself or others.      •You have a seizure.      Seek care immediately if:   •You cannot stop vomiting or you vomit blood.          Call your doctor if:   •You have hallucinations (you see or hear things that are not real).      •You have questions or concerns about your condition or care.      Medicines:   •Vitamin supplements may be given to treat low vitamin levels. Alcohol can make it hard for your body to absorb enough vitamins such as B1. Vitamin supplements may also be given to prevent alcohol-related brain damage.       •Take your medicine as directed. Contact your healthcare provider if you think your medicine is not helping or if you have side effects. Tell him or her if you are allergic to any medicine. Keep a list of the medicines, vitamins, and herbs you take. Include the amounts, and when and why you take them. Bring the list or the pill bottles to follow-up visits. Carry your medicine list with you in case of an emergency.      Health problems alcohol abuse can cause:   •Cancer in your liver, pancreas, stomach, colon, kidney, or breast      •Stroke or a heart attack      •Liver, kidney, or lung disease      •Blackouts, memory loss, brain damage, or dementia      •Diabetes, immune system problems, or thiamine (vitamin B1) deficiency      •Problems for you and your baby if you drink while pregnant      Recommended alcohol limits: One drink is defined as 12 oz of beer, 5 oz of wine, or 1.5 oz of liquor such as whiskey.  •Men 21 to 64 years should limit alcohol to 2 drinks a day. Do not have more than 4 drinks in 1 day or more than 14 in 1 week.      •All women, and men 65 or older should limit alcohol to 1 drink in a day. Do not have more than 3 drinks in 1 day or more than 7 in 1 week. Do not drink alcohol if you are pregnant.      Manage alcohol use:   •Work with healthcare providers on goals to drink less. This can help prevent health problems. For example, you may start by planning your weekly alcohol use. It will be easier to have fewer drinks if you plan ahead.      •Have food when you drink alcohol. Food will prevent alcohol from getting into your system too quickly. Eat before you have your first alcohol drink.      •Time your drinks carefully. Have no more than 1 drink in an hour. Have a liquid such as water, coffee, or a soft drink between alcohol drinks.      •Do not drive if you have had alcohol. Plan ahead so you have a safe ride home. Make sure someone who has not been drinking can help you get home safely. Plan to use a taxi or other ride service, if needed.      •Do not drink alcohol if you are taking medicine. Alcohol is dangerous when you combine it with certain medicines, such as acetaminophen or blood pressure medicine. Talk to your healthcare provider about all the medicines you currently take.      Follow up with your doctor as directed: Write down your questions so you remember to ask them during your visits.    For support and more information:   •Alcoholics Anonymous  Web Address: http://www.aa.org      •Substance Abuse and Mental Health Services Administration  [address]  [place] [address]  Web Address: http://www.samhsa.gov

## 2022-01-16 NOTE — ED PROVIDER NOTE - OBJECTIVE STATEMENT
32 y/o f hx alcohol abuse presents stating she has been drinking for the past 2 weeks everyday, hasn't eaten in 2 days.  Pt reports some nausea and vomiting in the past few days, last was earlier this morning.  Pt also cut her right 2nd finger 2 weeks ago and concerned she needs a tetanus shot.  Pt has been feeling shaky this evening and thinks she could be in alcohol withdrawal, although reports drinking a pint of [place] tonight.  Denies fever, chills, abd pain, CP, SOB, headache, AH/VH, all other ROS negative.

## 2022-01-16 NOTE — ED PROVIDER NOTE - PROGRESS NOTE DETAILS
Director - pt received from night team, resting comfortably, reports feeling tired and requesting water.  VS, labs reviewed.  Lytes repleted and ivf running.  Plan for dc when clinically sober. Yumi- pt ambulatory with steady gait, clinically stable for dc.  no e/o w/d.  offered referrals for outpt detox/rehab, states she already has and demanding dc.  discussed strict return parameters

## 2022-01-16 NOTE — ED PROVIDER NOTE - CLINICAL SUMMARY MEDICAL DECISION MAKING FREE TEXT BOX
32 y/o f hx EtOH abuse presents after has been binging on alcohol for the past 2 weeks, hasn't eaten anything in the past 2 days, wants her right 2nd finger evaluated after a cut she sustained 2 weeks ago.  Pt mildly tachycardic in ED, likely 2/2 dehydration and alcoholic starvation.  Labs show EtOH 448, K+ 3.0, sodium 148, likely 2/2 dehydration.  Pt given IV fluid, D5NS, potassium repleted, will observe in ED for sobriety

## 2022-01-16 NOTE — ED ADULT NURSE NOTE - OBJECTIVE STATEMENT
Patient to the ED with multiple medical complaints, reported that she has been drinking for the past 16 days 1 pt liquor every day, last drink 1 hr ago, thinks she is in withdrawal, steady gait noted, speech clear, patient endorsed she was dent home from work yesterday because she fainted, denies any injury, reports cutting her finger 1 month ago and its not healing, wants a tetanus shot, dry scab noted on finger, patient thinks she is pregnant, requesting a pregnancy test, LMP sometime in december. Patient is awake alert oriented.

## 2022-02-13 NOTE — ED PROVIDER NOTE - IV ALTEPLASE EXCL ABS HIDDEN
60-yr old male came into the ED for evaluation of left lower leg the redness, warmth and swelling for the last 3 days. Over the last 2 days the redness has greatly expanded to spread just below the left knee.   show

## 2022-04-11 PROBLEM — Z11.59 SCREENING FOR VIRAL DISEASE: Status: ACTIVE | Noted: 2021-01-19

## 2022-11-09 NOTE — H&P ADULT - PROBLEM SELECTOR PROBLEM 7
Problem: Safety - Adult  Goal: Free from fall injury  Outcome: Progressing     Problem: ABCDS Injury Assessment  Goal: Absence of physical injury  Outcome: Progressing     Problem: Pain  Goal: Verbalizes/displays adequate comfort level or baseline comfort level  Outcome: Progressing Hypothyroid

## 2023-08-15 NOTE — PATIENT PROFILE ADULT - PATIENT REPRESENTATIVE: ( YOU CAN CHOOSE ANY PERSON THAT CAN ASSIST YOU WITH YOUR HEALTH CARE PREFERENCES, DOES NOT HAVE TO BE A SPOUSE, IMMEDIATE FAMILY OR SIGNIFICANT OTHER/PARTNER)
declines
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 Patient had CT scan and imaging done, neuro evaluation. Orthostatic VS done and are unremarkable.    Patient had MRI performed and this shows microvascular changes but has no acute findings. Patient did not improve with fluids and Meclizine. Attempted to ambulate patient but she is not steady. She is unsafe to be discharged and lives home alone. PT consult placed and will upgrade to an admission.    ED/OBS work up reviewed and results and plan of care discussed with patient. Patient requires admission for further work up, monitoring, and management. Need for admission discussed with patient.
Insect bite

## 2024-03-28 NOTE — ED ADULT TRIAGE NOTE - WEIGHT IN KG
61
on Nepro 8oz TID (Provides 1,275kcal & 57grams of Protein)  Recommend Change Supplement to Nepro 8oz BID (Provides 850kcal & 38grams of Protein) - (Per Patient Request)  Recommend Initiate Sanju 1 Packet Daily

## 2024-10-10 NOTE — PATIENT PROFILE ADULT - DO YOU FEEL THREATENED BY OTHERS?
Crisis came and evaluated patient.  Like to continue her outpatient management of her behavioral needs.  Patient denies any SI, HI, visual or auditory hallucinations.  Patient was medically cleared by night shift attending.  See their note for additional information. Disposition discharge.     Kenisha Borjas DO  10/10/24 0936     normal... no

## 2025-01-16 NOTE — ED ADULT NURSE NOTE - ISOLATION TYPE:
Admission Reconciliation is Completed  Discharge Reconciliation is Not Complete None Admission Reconciliation is Completed  Discharge Reconciliation is Completed

## 2025-04-30 NOTE — ED ADULT NURSE NOTE - CADM POA CENTRAL LINE
c/w home donepezil 10mg qd c/w home donepezil 10mg qd  used c/w home donepezil 10mg qd c/w home donepezil 10mg qd c/w home donepezil 10mg qd No

## 2025-05-12 NOTE — DISCHARGE NOTE PROVIDER - REASON FOR ADMISSION
Detail Level: Zone Plan: Patient states that due to location she has been still putting Vaseline and a bandaid on daily. Advised patient to try to start letting the lesion dry out throughout the day or at night. Patient informed today it looks like normal healing biopsy site but if redness or increase in pain occurs to call the clinic. alcohol withdrawal